# Patient Record
Sex: FEMALE | Race: WHITE | NOT HISPANIC OR LATINO | Employment: OTHER | URBAN - METROPOLITAN AREA
[De-identification: names, ages, dates, MRNs, and addresses within clinical notes are randomized per-mention and may not be internally consistent; named-entity substitution may affect disease eponyms.]

---

## 2023-03-07 LAB
APPEARANCE, URINE: CLEAR
BILIRUBIN, URINE: NEGATIVE
BLOOD, URINE: NEGATIVE
COLOR, URINE: ABNORMAL
CREATININE (MG/DL) IN URINE: 18.4 MG/DL (ref 20–320)
GLUCOSE, URINE: NEGATIVE MG/DL
KETONES, URINE: NEGATIVE MG/DL
LEUKOCYTE ESTERASE, URINE: NEGATIVE
NITRITE, URINE: NEGATIVE
PH, URINE: 5 (ref 5–8)
PROTEIN (MG/DL) IN URINE: <4 MG/DL (ref 5–24)
PROTEIN, URINE: NEGATIVE MG/DL
PROTEIN/CREATININE (MG/MG) IN URINE: ABNORMAL MG/MG CREAT (ref 0–0.17)
SPECIFIC GRAVITY, URINE: 1 (ref 1–1.03)
UROBILINOGEN, URINE: <2 MG/DL (ref 0–1.9)

## 2023-10-02 ENCOUNTER — ANCILLARY PROCEDURE (OUTPATIENT)
Dept: RADIOLOGY | Facility: CLINIC | Age: 78
End: 2023-10-02
Payer: MEDICARE

## 2023-10-02 DIAGNOSIS — M81.0 AGE-RELATED OSTEOPOROSIS WITHOUT CURRENT PATHOLOGICAL FRACTURE: ICD-10-CM

## 2023-10-02 PROCEDURE — 77080 DXA BONE DENSITY AXIAL: CPT | Performed by: RADIOLOGY

## 2023-10-02 PROCEDURE — 77080 DXA BONE DENSITY AXIAL: CPT

## 2023-10-03 DIAGNOSIS — J31.0 CHRONIC RHINITIS: ICD-10-CM

## 2023-10-03 RX ORDER — MONTELUKAST SODIUM 10 MG/1
10 TABLET ORAL EVERY EVENING
COMMUNITY
Start: 2023-07-10 | End: 2023-10-03 | Stop reason: SDUPTHER

## 2023-10-03 RX ORDER — MONTELUKAST SODIUM 10 MG/1
10 TABLET ORAL EVERY EVENING
Qty: 90 TABLET | Refills: 1 | Status: SHIPPED | OUTPATIENT
Start: 2023-10-03 | End: 2024-03-28

## 2023-10-23 ENCOUNTER — TELEPHONE (OUTPATIENT)
Dept: HEMATOLOGY/ONCOLOGY | Facility: HOSPITAL | Age: 78
End: 2023-10-23

## 2023-10-24 ENCOUNTER — LAB (OUTPATIENT)
Dept: LAB | Facility: CLINIC | Age: 78
End: 2023-10-24
Payer: MEDICARE

## 2023-10-24 DIAGNOSIS — C34.90 MALIGNANT NEOPLASM OF UNSPECIFIED PART OF UNSPECIFIED BRONCHUS OR LUNG (MULTI): ICD-10-CM

## 2023-10-24 PROBLEM — L57.9 SKIN CHANGES DUE TO CHRONIC EXPOSURE TO NONIONIZING RADIATION, UNSPECIFIED: Status: ACTIVE | Noted: 2022-10-31

## 2023-10-24 PROBLEM — L27.0 RASH, DRUG: Status: ACTIVE | Noted: 2023-10-24

## 2023-10-24 PROBLEM — L85.3 XEROSIS CUTIS: Status: ACTIVE | Noted: 2022-10-31

## 2023-10-24 PROBLEM — H70.90 MASTOIDITIS: Status: ACTIVE | Noted: 2023-10-24

## 2023-10-24 PROBLEM — T78.40XA ALLERGIC REACTION: Status: ACTIVE | Noted: 2023-10-24

## 2023-10-24 PROBLEM — J31.0 CHRONIC RHINITIS: Status: ACTIVE | Noted: 2023-10-24

## 2023-10-24 PROBLEM — J45.20 INTERMITTENT ASTHMA (HHS-HCC): Status: ACTIVE | Noted: 2023-10-24

## 2023-10-24 PROBLEM — E03.9 HYPOTHYROIDISM: Status: ACTIVE | Noted: 2023-10-24

## 2023-10-24 PROBLEM — M25.473 ANKLE EDEMA: Status: ACTIVE | Noted: 2023-10-24

## 2023-10-24 PROBLEM — R42 DIZZINESS: Status: ACTIVE | Noted: 2023-10-24

## 2023-10-24 PROBLEM — R59.0 MEDIASTINAL ADENOPATHY: Status: ACTIVE | Noted: 2023-10-24

## 2023-10-24 PROBLEM — G43.909 HEMICRANIA MIGRAINE: Status: ACTIVE | Noted: 2023-10-24

## 2023-10-24 PROBLEM — D22.5 MELANOCYTIC NEVI OF TRUNK: Status: ACTIVE | Noted: 2022-10-31

## 2023-10-24 PROBLEM — H52.203 ASTIGMATISM OF BOTH EYES: Status: ACTIVE | Noted: 2023-10-24

## 2023-10-24 PROBLEM — H52.03 HYPEROPIA WITH PRESBYOPIA OF BOTH EYES: Status: ACTIVE | Noted: 2023-10-24

## 2023-10-24 PROBLEM — L91.8 CUTANEOUS SKIN TAGS: Status: ACTIVE | Noted: 2023-10-24

## 2023-10-24 PROBLEM — G43.109 CLASSIC MIGRAINE WITH AURA: Status: ACTIVE | Noted: 2023-10-24

## 2023-10-24 PROBLEM — H40.1122 PRIMARY OPEN ANGLE GLAUCOMA OF LEFT EYE, MODERATE STAGE: Status: ACTIVE | Noted: 2023-10-24

## 2023-10-24 PROBLEM — C34.11 PRIMARY MALIGNANT NEOPLASM OF RIGHT UPPER LOBE OF LUNG (MULTI): Status: ACTIVE | Noted: 2023-10-24

## 2023-10-24 PROBLEM — R91.8 PULMONARY NODULES/LESIONS, MULTIPLE: Status: ACTIVE | Noted: 2023-10-24

## 2023-10-24 PROBLEM — H52.11 MYOPIA OF RIGHT EYE: Status: ACTIVE | Noted: 2023-10-24

## 2023-10-24 PROBLEM — L91.8 OTHER HYPERTROPHIC DISORDERS OF THE SKIN: Status: ACTIVE | Noted: 2022-10-31

## 2023-10-24 PROBLEM — H25.11 AGE-RELATED NUCLEAR CATARACT OF RIGHT EYE: Status: ACTIVE | Noted: 2023-10-24

## 2023-10-24 PROBLEM — I10 WHITE COAT SYNDROME WITH HYPERTENSION: Status: ACTIVE | Noted: 2023-10-24

## 2023-10-24 PROBLEM — H01.006 BLEPHARITIS, BOTH EYES: Status: ACTIVE | Noted: 2023-10-24

## 2023-10-24 PROBLEM — M47.816 OSTEOARTHRITIS OF LUMBAR SPINE: Status: ACTIVE | Noted: 2023-10-24

## 2023-10-24 PROBLEM — D22.70 MELANOCYTIC NEVI OF LOWER LIMB, INCLUDING HIP: Status: ACTIVE | Noted: 2022-10-31

## 2023-10-24 PROBLEM — H74.90 MASTOID DISORDER: Status: ACTIVE | Noted: 2023-10-24

## 2023-10-24 PROBLEM — I10 ESSENTIAL HYPERTENSION: Status: ACTIVE | Noted: 2023-10-24

## 2023-10-24 PROBLEM — G44.039 PAROXYSMAL HEMICRANIA: Status: ACTIVE | Noted: 2023-10-24

## 2023-10-24 PROBLEM — H01.003 BLEPHARITIS, BOTH EYES: Status: ACTIVE | Noted: 2023-10-24

## 2023-10-24 PROBLEM — E78.5 HYPERLIPIDEMIA: Status: ACTIVE | Noted: 2023-10-24

## 2023-10-24 PROBLEM — D18.01 HEMANGIOMA OF SKIN AND SUBCUTANEOUS TISSUE: Status: ACTIVE | Noted: 2022-10-31

## 2023-10-24 PROBLEM — H52.02 HYPERMETROPIA OF LEFT EYE: Status: ACTIVE | Noted: 2023-10-24

## 2023-10-24 PROBLEM — J45.909 MODERATE ASTHMA (HHS-HCC): Status: ACTIVE | Noted: 2023-10-24

## 2023-10-24 PROBLEM — E27.8 ADRENAL MASS (MULTI): Status: ACTIVE | Noted: 2023-10-24

## 2023-10-24 PROBLEM — H40.9 GLAUCOMA: Status: ACTIVE | Noted: 2023-10-24

## 2023-10-24 PROBLEM — D22.9 BENIGN MOLE: Status: ACTIVE | Noted: 2023-10-24

## 2023-10-24 PROBLEM — R91.1 NODULE OF UPPER LOBE OF RIGHT LUNG: Status: ACTIVE | Noted: 2023-10-24

## 2023-10-24 PROBLEM — H25.12 AGE-RELATED NUCLEAR CATARACT OF LEFT EYE: Status: ACTIVE | Noted: 2023-10-24

## 2023-10-24 PROBLEM — H26.9 CATARACT, LEFT EYE: Status: ACTIVE | Noted: 2023-10-24

## 2023-10-24 PROBLEM — G47.00 INSOMNIA: Status: ACTIVE | Noted: 2023-10-24

## 2023-10-24 PROBLEM — H52.4 HYPEROPIA WITH PRESBYOPIA OF BOTH EYES: Status: ACTIVE | Noted: 2023-10-24

## 2023-10-24 PROBLEM — D22.60 MELANOCYTIC NEVI OF UNSPECIFIED UPPER LIMB, INCLUDING SHOULDER: Status: ACTIVE | Noted: 2022-10-31

## 2023-10-24 PROBLEM — L71.9 ROSACEA, UNSPECIFIED: Status: ACTIVE | Noted: 2022-10-31

## 2023-10-24 PROBLEM — R21 FACIAL RASH: Status: ACTIVE | Noted: 2023-10-24

## 2023-10-24 PROBLEM — K59.09 CHRONIC CONSTIPATION: Status: ACTIVE | Noted: 2023-10-24

## 2023-10-24 PROBLEM — R53.83 FATIGUE DUE TO TREATMENT: Status: ACTIVE | Noted: 2023-10-24

## 2023-10-24 PROBLEM — B00.9 HERPES SIMPLEX: Status: ACTIVE | Noted: 2023-10-24

## 2023-10-24 PROBLEM — L81.4 OTHER MELANIN HYPERPIGMENTATION: Status: ACTIVE | Noted: 2022-10-31

## 2023-10-24 PROBLEM — H47.399 CUP TO DISC ASYMMETRY: Status: ACTIVE | Noted: 2023-10-24

## 2023-10-24 PROBLEM — D22.9 NUMEROUS MOLES: Status: ACTIVE | Noted: 2023-10-24

## 2023-10-24 PROBLEM — M81.0 AGE RELATED OSTEOPOROSIS: Status: ACTIVE | Noted: 2023-10-24

## 2023-10-24 PROBLEM — L82.1 OTHER SEBORRHEIC KERATOSIS: Status: ACTIVE | Noted: 2022-10-31

## 2023-10-24 PROBLEM — E55.9 VITAMIN D DEFICIENCY: Status: ACTIVE | Noted: 2023-10-24

## 2023-10-24 PROBLEM — F41.9 ANXIETY: Status: ACTIVE | Noted: 2023-10-24

## 2023-10-24 PROBLEM — R20.2 PARESTHESIA: Status: ACTIVE | Noted: 2023-10-24

## 2023-10-24 PROBLEM — C44.92 SCC (SQUAMOUS CELL CARCINOMA): Status: ACTIVE | Noted: 2023-10-24

## 2023-10-24 PROBLEM — M85.80 OSTEOPENIA: Status: ACTIVE | Noted: 2023-10-24

## 2023-10-24 LAB
ALBUMIN SERPL BCP-MCNC: 4.2 G/DL (ref 3.4–5)
ALP SERPL-CCNC: 61 U/L (ref 33–136)
ALT SERPL W P-5'-P-CCNC: 13 U/L (ref 7–45)
ANION GAP SERPL CALC-SCNC: 12 MMOL/L (ref 10–20)
AST SERPL W P-5'-P-CCNC: 16 U/L (ref 9–39)
BASOPHILS # BLD MANUAL: 0 X10*3/UL (ref 0–0.1)
BASOPHILS NFR BLD MANUAL: 0 %
BILIRUB SERPL-MCNC: 0.4 MG/DL (ref 0–1.2)
BUN SERPL-MCNC: 20 MG/DL (ref 6–23)
CALCIUM SERPL-MCNC: 9 MG/DL (ref 8.6–10.6)
CHLORIDE SERPL-SCNC: 103 MMOL/L (ref 98–107)
CO2 SERPL-SCNC: 28 MMOL/L (ref 21–32)
CORTIS SERPL-MCNC: 16.8 UG/DL (ref 2.5–20)
CREAT SERPL-MCNC: 0.88 MG/DL (ref 0.5–1.05)
DACRYOCYTES BLD QL SMEAR: ABNORMAL
EOSINOPHIL # BLD MANUAL: 0 X10*3/UL (ref 0–0.4)
EOSINOPHIL NFR BLD MANUAL: 0 %
ERYTHROCYTE [DISTWIDTH] IN BLOOD BY AUTOMATED COUNT: 12.2 % (ref 11.5–14.5)
GFR SERPL CREATININE-BSD FRML MDRD: 67 ML/MIN/1.73M*2
GLUCOSE SERPL-MCNC: 121 MG/DL (ref 74–99)
HCT VFR BLD AUTO: 38.8 % (ref 36–46)
HGB BLD-MCNC: 12.6 G/DL (ref 12–16)
IMM GRANULOCYTES # BLD AUTO: 0.03 X10*3/UL (ref 0–0.5)
IMM GRANULOCYTES NFR BLD AUTO: 0.2 % (ref 0–0.9)
LYMPHOCYTES # BLD MANUAL: 12.34 X10*3/UL (ref 0.8–3)
LYMPHOCYTES NFR BLD MANUAL: 62 %
MAGNESIUM SERPL-MCNC: 2.08 MG/DL (ref 1.6–2.4)
MCH RBC QN AUTO: 29.7 PG (ref 26–34)
MCHC RBC AUTO-ENTMCNC: 32.5 G/DL (ref 32–36)
MCV RBC AUTO: 92 FL (ref 80–100)
MONOCYTES # BLD MANUAL: 1 X10*3/UL (ref 0.05–0.8)
MONOCYTES NFR BLD MANUAL: 5 %
NEUTS SEG # BLD MANUAL: 4.98 X10*3/UL (ref 1.6–5)
NEUTS SEG NFR BLD MANUAL: 25 %
NRBC BLD-RTO: ABNORMAL /100{WBCS}
OVALOCYTES BLD QL SMEAR: ABNORMAL
PLATELET # BLD AUTO: 179 X10*3/UL (ref 150–450)
PMV BLD AUTO: 9 FL (ref 7.5–11.5)
POTASSIUM SERPL-SCNC: 4 MMOL/L (ref 3.5–5.3)
PROT SERPL-MCNC: 6.2 G/DL (ref 6.4–8.2)
RBC # BLD AUTO: 4.24 X10*6/UL (ref 4–5.2)
RBC MORPH BLD: ABNORMAL
SODIUM SERPL-SCNC: 139 MMOL/L (ref 136–145)
TOTAL CELLS COUNTED BLD: 100
VARIANT LYMPHS # BLD MANUAL: 1.59 X10*3/UL (ref 0–0.3)
VARIANT LYMPHS NFR BLD: 8 %
WBC # BLD AUTO: 19.9 X10*3/UL (ref 4.4–11.3)

## 2023-10-24 PROCEDURE — 82024 ASSAY OF ACTH: CPT

## 2023-10-24 PROCEDURE — 36415 COLL VENOUS BLD VENIPUNCTURE: CPT

## 2023-10-24 PROCEDURE — 80053 COMPREHEN METABOLIC PANEL: CPT | Performed by: NURSE PRACTITIONER

## 2023-10-24 PROCEDURE — 82533 TOTAL CORTISOL: CPT | Performed by: NURSE PRACTITIONER

## 2023-10-24 PROCEDURE — 85027 COMPLETE CBC AUTOMATED: CPT

## 2023-10-24 PROCEDURE — 83735 ASSAY OF MAGNESIUM: CPT | Performed by: NURSE PRACTITIONER

## 2023-10-24 PROCEDURE — 85007 BL SMEAR W/DIFF WBC COUNT: CPT

## 2023-10-24 RX ORDER — PEMBROLIZUMAB 25 MG/ML
INJECTION, SOLUTION INTRAVENOUS
COMMUNITY
Start: 2022-06-09 | End: 2024-02-12 | Stop reason: ALTCHOICE

## 2023-10-24 RX ORDER — TRIAMCINOLONE ACETONIDE 1 MG/ML
LOTION TOPICAL 2 TIMES DAILY
COMMUNITY
Start: 2020-08-20 | End: 2024-02-12 | Stop reason: ALTCHOICE

## 2023-10-24 RX ORDER — UBIDECARENONE 75 MG
CAPSULE ORAL
COMMUNITY

## 2023-10-24 RX ORDER — VITAMIN B COMPLEX
CAPSULE ORAL
COMMUNITY

## 2023-10-24 RX ORDER — CHOLECALCIFEROL (VITAMIN D3) 25 MCG
1000 TABLET ORAL
COMMUNITY
Start: 2018-04-12 | End: 2024-02-12 | Stop reason: ALTCHOICE

## 2023-10-24 RX ORDER — GLUCOSAMINE SULFATE 500 MG
1000 TABLET ORAL
COMMUNITY

## 2023-10-24 RX ORDER — ONDANSETRON HYDROCHLORIDE 8 MG/1
8 TABLET, FILM COATED ORAL EVERY 8 HOURS PRN
COMMUNITY
Start: 2020-01-08 | End: 2024-02-12 | Stop reason: ALTCHOICE

## 2023-10-24 RX ORDER — GABAPENTIN 100 MG/1
100 CAPSULE ORAL
COMMUNITY
Start: 2023-03-23 | End: 2024-02-12 | Stop reason: ALTCHOICE

## 2023-10-24 RX ORDER — DIAPER,BRIEF,ADULT, DISPOSABLE
1200 EACH MISCELLANEOUS
COMMUNITY

## 2023-10-24 RX ORDER — PEMETREXED 500 MG/20ML
INJECTION, POWDER, LYOPHILIZED, FOR SOLUTION INTRAVENOUS
COMMUNITY
Start: 2022-06-09 | End: 2024-02-12 | Stop reason: ALTCHOICE

## 2023-10-24 RX ORDER — ALPRAZOLAM 0.5 MG/1
0.5 TABLET ORAL 3 TIMES DAILY PRN
COMMUNITY
End: 2023-11-08 | Stop reason: SDUPTHER

## 2023-10-24 RX ORDER — ASCORBIC ACID 500 MG
1 TABLET ORAL DAILY
COMMUNITY

## 2023-10-24 RX ORDER — PSYLLIUM SEED (WITH DEXTROSE)
POWDER (GRAM) ORAL
COMMUNITY

## 2023-10-24 RX ORDER — SYRING-NEEDL,DISP,INSUL,0.3 ML 29 G X1/2"
SYRINGE, EMPTY DISPOSABLE MISCELLANEOUS
COMMUNITY
Start: 2020-07-16 | End: 2024-02-12 | Stop reason: ALTCHOICE

## 2023-10-24 RX ORDER — TAFLUPROST OPTHALMIC 0 MG/.3ML
1 SOLUTION/ DROPS OPHTHALMIC NIGHTLY
COMMUNITY
End: 2024-02-12 | Stop reason: ALTCHOICE

## 2023-10-24 RX ORDER — LISINOPRIL 2.5 MG/1
1 TABLET ORAL DAILY
COMMUNITY
Start: 2017-06-08 | End: 2024-02-12 | Stop reason: ALTCHOICE

## 2023-10-24 RX ORDER — CALCIUM CARBONATE 600 MG
1 TABLET ORAL 2 TIMES DAILY
COMMUNITY
Start: 2018-04-12

## 2023-10-24 RX ORDER — ROSUVASTATIN CALCIUM 10 MG/1
1 TABLET, COATED ORAL DAILY
COMMUNITY
Start: 2016-03-30 | End: 2023-10-31 | Stop reason: SDUPTHER

## 2023-10-24 RX ORDER — LEVOTHYROXINE SODIUM 100 UG/1
100 TABLET ORAL
COMMUNITY
End: 2024-02-12 | Stop reason: WASHOUT

## 2023-10-24 RX ORDER — FLAXSEED OIL 1000 MG
CAPSULE ORAL
COMMUNITY
End: 2024-02-12 | Stop reason: ALTCHOICE

## 2023-10-24 RX ORDER — DEXAMETHASONE 4 MG/1
4 TABLET ORAL 2 TIMES DAILY
COMMUNITY
End: 2024-02-12 | Stop reason: ALTCHOICE

## 2023-10-24 RX ORDER — DICLOFENAC SODIUM 1 MG/ML
SOLUTION/ DROPS OPHTHALMIC
COMMUNITY
Start: 2022-07-22 | End: 2024-02-12 | Stop reason: ALTCHOICE

## 2023-10-24 RX ORDER — ASPIRIN 81 MG
TABLET, DELAYED RELEASE (ENTERIC COATED) ORAL
COMMUNITY

## 2023-10-24 RX ORDER — FOLIC ACID 1 MG/1
1 TABLET ORAL DAILY
COMMUNITY

## 2023-10-24 RX ORDER — TURMERIC ROOT EXTRACT 500 MG
500 TABLET ORAL
COMMUNITY

## 2023-10-24 RX ORDER — ACETAMINOPHEN 500 MG
5 TABLET ORAL NIGHTLY
COMMUNITY
Start: 2022-06-09

## 2023-10-24 RX ORDER — METRONIDAZOLE 7.5 MG/G
1 CREAM TOPICAL
COMMUNITY
Start: 2021-12-20 | End: 2024-02-12 | Stop reason: ALTCHOICE

## 2023-10-24 RX ORDER — LEVOTHYROXINE SODIUM 75 UG/1
1 TABLET ORAL DAILY
COMMUNITY
Start: 2013-04-26 | End: 2024-03-22 | Stop reason: SDUPTHER

## 2023-10-24 RX ORDER — EPINEPHRINE 0.22MG
100 AEROSOL WITH ADAPTER (ML) INHALATION
COMMUNITY

## 2023-10-25 ENCOUNTER — OFFICE VISIT (OUTPATIENT)
Dept: HEMATOLOGY/ONCOLOGY | Facility: CLINIC | Age: 78
End: 2023-10-25
Payer: MEDICARE

## 2023-10-25 DIAGNOSIS — C34.90 MALIGNANT NEOPLASM OF UNSPECIFIED PART OF UNSPECIFIED BRONCHUS OR LUNG (MULTI): ICD-10-CM

## 2023-10-25 PROCEDURE — 1126F AMNT PAIN NOTED NONE PRSNT: CPT | Performed by: NURSE PRACTITIONER

## 2023-10-25 PROCEDURE — 99441 PR PHYS/QHP TELEPHONE EVALUATION 5-10 MIN: CPT | Performed by: NURSE PRACTITIONER

## 2023-10-25 NOTE — PROGRESS NOTES
[facility] - Medical Oncology Follow-Up Visit    Patient ID: Herlinda Springer is a 77 y.o. female      Current therapy: Surveillance    Oncologic History:          Cancer History:    Lung   AJCC Edition: 8th (AJCC), Diagnosis Date: 17-Dec-2019, CLIFTON, cT2a cN3 cM1b     Treatment Synopsis:    Stage CLIFTON ([id number]) NSCLC (adenocarcinoma, TTF-1+, Napsin A+) of the RUL   Dx through left adrenal gland bx on 12/17/19  MRI brain 12/22/19 - 2 mm enhancing lesion left parietal lobe, unclear if metastasis - repeat MRI 06/26/20 showed resolution of that finding   CT 06/26/20 shows oligo-progression of RUL - left adrenal metastasis stable - s/p RT to the RUL - completed 08/19/20    Molecular testing:  TP53 mutant   EGFR negative  ALK-1 negative   ROS1 negative  BRAF V600E negative  NTRK negative     PD-L1 TPS < 1%    Current therapy:   Cycle # 1 carboplatin/pemetrexed/pembrolizumab - 01/15/20, cycle # 4 on 03/18/20  Starts maintenance pemetrexed/pembrolizumab on 04/08/20  Pemetrexed discontinued on 03/01/2023 due to decline GFR'   Last pembrolizumab dose on 08/02/2023    Co-morbidities:  Asthma  HTN  Hypothyroidism   HLD  Glaucoma               Treatment History:    Mrs. Springer is a 76yo white female, former smoker (quit 25 years prior to dx)who presented with new onset respiratory sx (cough), decreased appetite and weight loss (lost 13 lbs in 2 months).   Chest CT 11/07/19 showed a RUL nodule measuring 4 cm, mediastinal adenopathy (level 2L measuring 1.8 cm, .   A PET scan obtained on 12/03/19 showed FDG uptake in the RUL nodule as well as 1R/1L nodes, bilateral mediastinal and right hilar node. Left adrenal gland was hypermetabolic, c/w metastasis.  On 12/17/19 CT-guided biopsy of the left adrenal gland confirmed the presence of a mucinous adenocarcinoma, TTF-1 positive, Napsin A and CK7 also positive. PD-L1 TPS < 1%, with no actionable mutations, TP53 mutant.   MRI brain 12/22/19 showed a 2 mm  enhancement in left temporal lobe that is nonspecific but could be an additional metastatic site.   01/15/20: cycle # 1 carbo/pemetrexed/pembrolizumab   03/18/20: Cycle # 4 carbo/pemetrexed/pembrolizumab   04/08/20: starts maintenance pemetrexed/pembrolizumab   05/26/20: MRI brain shows no intracranial disease  05/26/20: CT C/A/P: increase in size of RUL mass - now measuring ~ 3.2 cm and stability of left adrenal gland - referred to Rad Onc for consideration of SBRT - continues pemetrexed/pembrolizumab maintenance   08/19/20:  completes RT to the RUL  08/20/20: grade 1 rash b/l UEs - treated with topical triamcinolone - continue pemetrexed + pembrolizumab  10/16/20: CT C/A/P: Increased consolidative changes  in the RUL c/w RT-induced fibrosis - no LAD. The adrenal glands look stable - the multiple lung GG opacifications are stable   11/27/20: CT C/A/P - improvement on RUL interstitial changes - there is no sign of PD. The RUL mass has decreased in size from 7 to 5 cm in greatest diameter - left adrenal gland is stable in size.   02/19/21: CT C/A/P personally reviewed by me: the RUL apical lesion that has been irradiated is further  decreased in size, now measuring 3.9 cm x 2 cm and previously on 11/24/21 measuring 4.8 cm x 2.4 cm (solid component). The left adrenal mass measures 3.4 cm and is stable in size. There are no new concerning metastatic lesions,. Multiple areas of GGO remain and are of no clinical significance in this patients with metastatic disease.   05/14/21: CT Chest/abdomen/pelvis personally reviewed by me. The RUL mass remains stable and so do the nodes. However, the GGO opacifications persist - the left adrenal gland is slightly smaller (3.6 cm << 3.8 cm). The right adrenal gland remains stable.     6/30/21: Will continue maintenance pembrolizumab and pemetrexed. Given CrCl, will dose Pemetrexed at 375 mg/m2 today.     08/06/21: CT C/A/P shows further development of interstitial changes in the RUL  that look purely inflammatory in nature - there are no concerns for PD - no worsening LAD, the left adrenal mass remains stable at 3.6 cm. This adrenal gland has been biopsied at her dx     12/09/21: CT C/A/P shows SD - the RUL changes from prior RT remain stable - no new emerging LAD. Adrenal glands stable     12/15/21: dopplers LE negative for DVT     03/04/2022: CT Chest/Abdomen/Pelvis shows no signs of PD with the exception of left adrenal gland that continues to measure 3.4 cm however has developed an area of necrosis that is concerning. Both RUL and LLL GGO remain stable measuring 2.7 cm and 2.8 cm respectively     03/25/2022: PET scan demonstrates close to metabolic complete remission, left adrenal gland SUV has decreased from 6.4 to 2.2    4/4/22-4/13/22: Completed SBRT to left adrenal gland.    06/17/2022: CT Chest/Abdomen/Pelvis demonstrates stable findings, stable size of left adrenal gland, stable GGO throughout bilateral lung fields     09/09/2022: CT CAP shows stable disease including stability of L adrenal gland; concern in liver noted on read reviewed and more consistent with fatty infiltration also seen on previous scans    10/07/2022: MRI brain demonstrates no intracranial metastasis.  Findings compatible with mastoiditis but subsequent CT scan ruled out mastoiditis, with a finding of osteopenia.    12/02/2022: CT CAP shows stable disease within the lung, L adrenal gland. Also noted stable pancreatic head & uncinate process lesions thought to represent IPMNs    02/20/2023: CT chest/abdomen/pelvis personally reviewed by me, demonstrating stable changes in the lungs as well as adrenal glands.  No new metastatic sites demonstrated.    03/01/2023: Discussion with patient regards to risks and benefits of continued single agent pembrolizumab in view of her issues with declining GFR associated with pemetrexed infusions.  Decision made to continue on single agent pembrolizumab every 3 weeks for  now    05/18/2023: CT CAP show stable disease without new metastatic sites    08/02/2023: Last pembrolizumab dose    08/25/2023: CT chest/abdomen/pelvis demonstrating stability of findings with no new metastatic sites  Left adrenal measuring 3 cm in greatest diameter                    Chief Concern: Phone visit for follow-up and review of labs    HPI Phone visit follow-up today. Labs reviewed. Leukocytosis continues without clear cause. Patient states her abdominal pain has completely resolved. Breathing is stable. No GI/ symptoms. No new aches/pains. No fevers, chills, or cold symptoms No neurological symptoms. Referral placed for hematology for unclear leukocytosis, was able to schedule 11/9/23 appointment with Rose Casal.       Meds (Current):    Current Outpatient Medications:     ALPRAZolam (Xanax) 0.5 mg tablet, Take 1 tablet (0.5 mg) by mouth 3 times a day as needed for anxiety., Disp: , Rfl:     ascorbic acid (Vitamin C) 500 mg tablet, Take 1 tablet (500 mg) by mouth once daily., Disp: , Rfl:     b complex vitamins capsule, Take by mouth., Disp: , Rfl:     calcium carbonate 600 mg calcium (1,500 mg) tablet, Take 1 tablet (600 mg) by mouth 2 times a day., Disp: , Rfl:     calcium carbonate/vitamin D3 (CALCIUM 600 + D,3, ORAL), Take by mouth., Disp: , Rfl:     cholecalciferol (Vitamin D-3) 25 MCG (1000 UT) tablet, Take 1 tablet (1,000 Units) by mouth 1 (one) time per week., Disp: , Rfl:     coenzyme Q-10 100 mg capsule, Take 1 capsule (100 mg) by mouth., Disp: , Rfl:     cyanocobalamin (Vitamin B-12) 500 mcg tablet, Take by mouth., Disp: , Rfl:     dexAMETHasone (Decadron) 4 mg tablet, Take 1 tablet (4 mg) by mouth 2 times a day., Disp: , Rfl:     diclofenac (Voltaren) 0.1 % ophthalmic solution, Administer into affected eye(s)., Disp: , Rfl:     docusate sodium (Stool Softener) 100 mg tablet, Take by mouth., Disp: , Rfl:     flaxseed oiL 1,000 mg capsule, Take by mouth., Disp: , Rfl:     folic acid  (Folvite) 1 mg tablet, Take 1 tablet (1 mg) by mouth once daily., Disp: , Rfl:     gabapentin (Neurontin) 100 mg capsule, Take 1 capsule (100 mg) by mouth., Disp: , Rfl:     lecithin, soy 1,200 mg capsule, Take 1,200 mg by mouth., Disp: , Rfl:     levothyroxine (Synthroid) 100 mcg tablet, Take 1 tablet (100 mcg) by mouth., Disp: , Rfl:     lisinopril 2.5 mg tablet, Take 1 tablet (2.5 mg) by mouth once daily., Disp: , Rfl:     lysine 1,000 mg tablet, Take 1 tablet (1,000 mg) by mouth., Disp: , Rfl:     magnesium citrate solution, Take by mouth., Disp: , Rfl:     melatonin 5 mg tablet, Take 1 tablet (5 mg) by mouth once daily at bedtime., Disp: , Rfl:     metroNIDAZOLE (MetroCream) 0.75 % cream, Apply 1 Application topically., Disp: , Rfl:     MILK THISTLE ORAL, Take 250 mg by mouth., Disp: , Rfl:     montelukast (Singulair) 10 mg tablet, Take 1 tablet (10 mg) by mouth once daily in the evening., Disp: 90 tablet, Rfl: 1    ondansetron (Zofran) 8 mg tablet, Take 1 tablet (8 mg) by mouth every 8 hours if needed., Disp: , Rfl:     pembrolizumab (Keytruda) 25 mg/mL chemo injection, Infuse into a venous catheter., Disp: , Rfl:     PEMBROLIZUMAB IV, Infuse 200 mg into a venous catheter., Disp: , Rfl:     PEMEtrexed disodium (Alimta) 500 mg chemo injection, Infuse into a venous catheter., Disp: , Rfl:     psyllium husk (METAMUCIL ORAL), Take by mouth., Disp: , Rfl:     psyllium husk, with sugar, (Metamucil, with sugar,) 3.4 gram/12 gram powder, Take by mouth., Disp: , Rfl:     rosuvastatin (Crestor) 10 mg tablet, Take 1 tablet (10 mg) by mouth once daily., Disp: , Rfl:     selenium (SELENIMIN ORAL), Take by mouth., Disp: , Rfl:     Study [id number] pembrolizumab 25 mg/mL, Infuse into a venous catheter., Disp: , Rfl:     Synthroid 75 mcg tablet, Take 1 tablet (75 mcg) by mouth once daily., Disp: , Rfl:     tafluprost, PF, (Zioptan, PF,) 0.0015 % dropperette, Administer 1 drop into affected eye(s) once daily at bedtime., Disp: ,  Rfl:     triamcinolone (Kenalog) 0.1 % lotion, Apply topically 2 times a day., Disp: , Rfl:     TUMERIC-GING-OLIVE-OREG-CAPRYL ORAL, Take by mouth., Disp: , Rfl:     turmeric root extract 500 mg tablet, Take 500 mg by mouth., Disp: , Rfl:     Review of Systems - Oncology 12 point ROS was obtained and negative unless otherwise mentioned in the above HPI.      Objective   BSA: There is no height or weight on file to calculate BSA.  Wt Readings from Last 5 Encounters:   06/09/23 55.8 kg (123 lb)   03/23/23 56.7 kg (125 lb)   03/22/23 56.8 kg (125 lb 3.5 oz)   03/03/23 56.7 kg (125 lb)   03/01/23 57.8 kg (127 lb 6.8 oz)     There were no vitals taken for this visit.         Physical Exam Not performed due to visit type.      Results:  Labs:  Lab Results   Component Value Date    WBC 19.9 (H) 10/24/2023    HGB 12.6 10/24/2023    HCT 38.8 10/24/2023    MCV 92 10/24/2023     10/24/2023      Lab Results   Component Value Date    NEUTROABS 4.19 06/12/2023      Lab Results   Component Value Date    GLUCOSE 121 (H) 10/24/2023    CALCIUM 9.0 10/24/2023     10/24/2023    K 4.0 10/24/2023    CO2 28 10/24/2023     10/24/2023    BUN 20 10/24/2023    CREATININE 0.88 10/24/2023    MG 2.08 10/24/2023     Lab Results   Component Value Date    ALT 13 10/24/2023    AST 16 10/24/2023    ALKPHOS 61 10/24/2023    BILITOT 0.4 10/24/2023      Lab Results   Component Value Date    ACTH 63.3 09/18/2023    CORTISOL 16.8 10/24/2023    TSH 0.83 09/18/2023    FREET4 1.26 09/18/2023       Imaging:         === Results for orders placed in visit on 10/07/22 ===    MR brain w and wo IV contrast [[id number]] 10/07/2022    Status: Normal  1. No evidence of intracranial metastases. Stable appearance of the  intracranial compartment.    2. Chronic appearing mucosal disease has mildly increased since the  prior MRI and there is increased opacification of bilateral mastoid  air cells with nonspecific fluid. Correlate clinically with  symptoms  of progressive mucosal disease and mastoiditis in this patient with  dizziness. No air-fluid levels are seen within the paranasal sinuses.    This study was interpreted at [facility].    Assessment/Plan      Herlinda Springer is a 77 y.o. female here for follow up   Assessment and Plan:   Assessment:    Stage CLIFTON NSCLC (adenocarcinoma) with biopsy proven metastasis to the left adrenal gland - dx on 12/17/19  Tumor had no actionable mutations (TP53 mutant) - and PD-L1 TPS < 1%.  s/p 4 cycles of chemoimmunotherapy utilizing carboplatin/ pemetrexed/ pembrolizumab from 1/15/20-3/18/2020  Most recent CT scans continue to show disease control on maintenance pembrolizumab; pemetrexed discontinued due to drop in GFR.      SBRT to left adrenal gland 04/04/2022-4/13/22.  Last pembrolizumab on 08/02/2023  Currently on observation    Today, she is feeling well. Labs were reviewed. No other concerns or complaints.     Plan:    Plan:  #Stage CLIFTON NSCLC -   Scans from 08/25/2023 showing no new concerns  She has demonstrated a persistent leukocytosis of unclear etiology, referral placed to hematology, scheduled 11/9/23 with Rose Casal.  New symptoms as above, unclear if she will be developing immune related adverse event soon  We will continue to follow closely, Repeating scans in 3 months from now, November 2023 (ordered)   RTC 11/29/23 with Dr. Alva, with repeat scan/labs prior to visit. She will call with any new/worsening symptoms in the interim.       #Leukocytosis, abdominal pain (resolved)  Her albumin levels are in trending up, so no concerns for systemic progression, especially review of scans as above  She has no signs/symptoms of infection with the exception of elevated white blood cell count with a left shift  Amylase and lipase remain WNL  She will call with any new/worsening symptoms in the interim.     #Dizziness  -resolved  MRI brain done recently demonstrated chronic  mastoiditis, slightly worse - seen by ENT with subsequent CT r/o mastoiditis, with finding of osteopenia, was started on supplemental Calcium with vit. D    #Left adrenal gland showing change in density concerning for necrosis/progression  -3/25/2022- PET scan demonstrates near complete metabolic remission, left adrenal gland SUV decreased from 6.4 to 2.2  Status post SBRT  04/04/2022-4/13/22  -Review of most recent scans demonstrates stability of appearance of left adrenal gland     #Insomnia   -ambien as needed     #History of recurrent migraines   - Avoid NSAIDs for her migraines - explained need for adequate kidney function for pemetrexed infusion.     #Anxiety   - meditating, doing Yoga, Des and [employer] at [facility]  - uses Xanax as needed.

## 2023-10-26 LAB — ACTH PLAS-MCNC: 65.3 PG/ML (ref 7.2–63.3)

## 2023-10-30 ENCOUNTER — OFFICE VISIT (OUTPATIENT)
Dept: DERMATOLOGY | Facility: HOSPITAL | Age: 78
End: 2023-10-30
Payer: MEDICARE

## 2023-10-30 DIAGNOSIS — D22.9 MULTIPLE BENIGN NEVI: ICD-10-CM

## 2023-10-30 DIAGNOSIS — Z12.83 SCREENING EXAM FOR SKIN CANCER: ICD-10-CM

## 2023-10-30 DIAGNOSIS — D48.5 NEOPLASM OF UNCERTAIN BEHAVIOR OF SKIN: Primary | ICD-10-CM

## 2023-10-30 DIAGNOSIS — L81.4 LENTIGO: ICD-10-CM

## 2023-10-30 DIAGNOSIS — L82.1 SEBORRHEIC KERATOSIS: ICD-10-CM

## 2023-10-30 PROBLEM — H74.90 MASTOID DISORDER: Status: RESOLVED | Noted: 2023-10-24 | Resolved: 2023-10-30

## 2023-10-30 PROBLEM — J31.0 CHRONIC RHINITIS: Status: RESOLVED | Noted: 2023-10-24 | Resolved: 2023-10-30

## 2023-10-30 PROBLEM — H70.90 MASTOIDITIS: Status: RESOLVED | Noted: 2023-10-24 | Resolved: 2023-10-30

## 2023-10-30 PROBLEM — D18.01 HEMANGIOMA OF SKIN AND SUBCUTANEOUS TISSUE: Status: RESOLVED | Noted: 2022-10-31 | Resolved: 2023-10-30

## 2023-10-30 PROBLEM — L57.9 SKIN CHANGES DUE TO CHRONIC EXPOSURE TO NONIONIZING RADIATION, UNSPECIFIED: Status: RESOLVED | Noted: 2022-10-31 | Resolved: 2023-10-30

## 2023-10-30 PROBLEM — R21 FACIAL RASH: Status: RESOLVED | Noted: 2023-10-24 | Resolved: 2023-10-30

## 2023-10-30 PROBLEM — D22.70 MELANOCYTIC NEVI OF LOWER LIMB, INCLUDING HIP: Status: RESOLVED | Noted: 2022-10-31 | Resolved: 2023-10-30

## 2023-10-30 PROBLEM — L91.8 CUTANEOUS SKIN TAGS: Status: RESOLVED | Noted: 2023-10-24 | Resolved: 2023-10-30

## 2023-10-30 PROBLEM — D22.5 MELANOCYTIC NEVI OF TRUNK: Status: RESOLVED | Noted: 2022-10-31 | Resolved: 2023-10-30

## 2023-10-30 PROBLEM — C44.92 SCC (SQUAMOUS CELL CARCINOMA): Status: RESOLVED | Noted: 2023-10-24 | Resolved: 2023-10-30

## 2023-10-30 PROBLEM — D22.60 MELANOCYTIC NEVI OF UNSPECIFIED UPPER LIMB, INCLUDING SHOULDER: Status: RESOLVED | Noted: 2022-10-31 | Resolved: 2023-10-30

## 2023-10-30 PROBLEM — L91.8 OTHER HYPERTROPHIC DISORDERS OF THE SKIN: Status: RESOLVED | Noted: 2022-10-31 | Resolved: 2023-10-30

## 2023-10-30 PROCEDURE — 99213 OFFICE O/P EST LOW 20 MIN: CPT | Performed by: DERMATOLOGY

## 2023-10-30 PROCEDURE — 1126F AMNT PAIN NOTED NONE PRSNT: CPT | Performed by: DERMATOLOGY

## 2023-10-30 PROCEDURE — 1160F RVW MEDS BY RX/DR IN RCRD: CPT | Performed by: DERMATOLOGY

## 2023-10-30 PROCEDURE — 1159F MED LIST DOCD IN RCRD: CPT | Performed by: DERMATOLOGY

## 2023-10-30 ASSESSMENT — DERMATOLOGY PATIENT ASSESSMENT
DO YOU USE SUNSCREEN: DAILY
DO YOU HAVE ANY NEW OR CHANGING LESIONS: NO
ARE YOU ON BIRTH CONTROL: NO
ARE YOU TRYING TO GET PREGNANT: NO
HAVE YOU HAD OR DO YOU HAVE VASCULAR DISEASE: NO
HAVE YOU HAD OR DO YOU HAVE A STAPH INFECTION: NO
DO YOU USE A TANNING BED: NO
ARE YOU AN ORGAN TRANSPLANT RECIPIENT: NO
DO YOU HAVE IRREGULAR MENSTRUAL CYCLES: NO

## 2023-10-30 ASSESSMENT — DERMATOLOGY QUALITY OF LIFE (QOL) ASSESSMENT
ARE THERE EXCLUSIONS OR EXCEPTIONS FOR THE QUALITY OF LIFE ASSESSMENT: NO
DATE THE QUALITY-OF-LIFE ASSESSMENT WAS COMPLETED: 66777
RATE HOW EMOTIONALLY BOTHERED YOU ARE BY YOUR SKIN PROBLEM (FOR EXAMPLE, WORRY, EMBARRASSMENT, FRUSTRATION): 0 - NEVER BOTHERED
RATE HOW BOTHERED YOU ARE BY EFFECTS OF YOUR SKIN PROBLEMS ON YOUR ACTIVITIES (EG, GOING OUT, ACCOMPLISHING WHAT YOU WANT, WORK ACTIVITIES OR YOUR RELATIONSHIPS WITH OTHERS): 0 - NEVER BOTHERED
RATE HOW BOTHERED YOU ARE BY SYMPTOMS OF YOUR SKIN PROBLEM (EG, ITCHING, STINGING BURNING, HURTING OR SKIN IRRITATION): 0 - NEVER BOTHERED

## 2023-10-30 ASSESSMENT — PATIENT GLOBAL ASSESSMENT (PGA): PATIENT GLOBAL ASSESSMENT: PATIENT GLOBAL ASSESSMENT:  1 - CLEAR

## 2023-10-30 ASSESSMENT — ITCH NUMERIC RATING SCALE: HOW SEVERE IS YOUR ITCHING?: 0

## 2023-10-30 NOTE — PROGRESS NOTES
Subjective     Herlinda Springer is a 77 y.o. female who presents for the following: Skin Check.     History of lung cancer treated with immunotherapy with Keytruda, last treatment 8/2/23, stopped due to elevated white count. Plan for monitoring    Review of Systems:  No other skin or systemic complaints other than what is documented elsewhere in the note.    The following portions of the chart were reviewed this encounter and updated as appropriate:  Tobacco  Allergies  Meds  Problems  Med Hx  Surg Hx         Skin Cancer History  No skin cancer on file.      Specialty Problems          Dermatology Problems    Rosacea, unspecified    Xerosis cutis    Numerous moles    Rash, drug        Objective   Well appearing patient in no apparent distress; mood and affect are within normal limits.    A full examination was performed including scalp, head, eyes, ears, nose, lips, neck, chest, axillae, abdomen, back, buttocks, bilateral upper extremities, bilateral lower extremities, hands, feet, fingers, toes, fingernails, and toenails. All findings within normal limits unless otherwise noted below.    Assessment/Plan   1. Neoplasm of uncertain behavior of skin  Right Lower Leg - Anterior  Medial calf - 2x4 mm dark brown macule with irregular darker pigment within; stable in size x1 year              Continue to monitor clinically, photos taken today    2. Multiple benign nevi  Brown and tan macules and papules with reassuring findings on dermoscopy    -These lesions have benign, reassuring patterns on dermoscopy  -Recommend continued self observation, and to contact the office if any changes in nevi are noticed    3. Lentigo  Tan macules    -Benign appearing on exam  -Reassurance, recommend observation    4. Seborrheic keratosis  Stuck on, waxy macule(s)/papule(s)/plaque(s) with comedo-like openings and milia like cysts    -Discussed the nature of the diagnosis  -Reassurance, recommend continued observation    5. Screening  exam for skin cancer    Full body skin exam  -No lesions concerning for malignancy on the remainder the skin exam today   - The ugly duckling sign was discussed. Monitor for any skin lesions that are different in color, shape, or size than others on body  -Sun protection was discussed. Recommend SPF 30+, hats with brims, sun protective clothing, and avoiding sun exposure between 10 AM and 2 PM whenever possible  -Recommend regular skin exams or sooner if new or changing lesions         1 year Full Skin Exam

## 2023-10-31 DIAGNOSIS — E78.5 HYPERLIPIDEMIA, UNSPECIFIED HYPERLIPIDEMIA TYPE: ICD-10-CM

## 2023-10-31 RX ORDER — ROSUVASTATIN CALCIUM 10 MG/1
10 TABLET, COATED ORAL DAILY
Qty: 90 TABLET | Refills: 1 | Status: SHIPPED | OUTPATIENT
Start: 2023-10-31 | End: 2024-04-24

## 2023-11-07 ENCOUNTER — TELEPHONE (OUTPATIENT)
Dept: HEMATOLOGY/ONCOLOGY | Facility: HOSPITAL | Age: 78
End: 2023-11-07
Payer: MEDICARE

## 2023-11-08 DIAGNOSIS — F41.9 ANXIETY: Primary | ICD-10-CM

## 2023-11-08 RX ORDER — ALPRAZOLAM 0.5 MG/1
0.5 TABLET ORAL 3 TIMES DAILY PRN
Qty: 90 TABLET | Refills: 0 | Status: SHIPPED | OUTPATIENT
Start: 2023-11-08 | End: 2023-11-10 | Stop reason: SDUPTHER

## 2023-11-10 ENCOUNTER — TELEPHONE (OUTPATIENT)
Dept: ADMISSION | Facility: HOSPITAL | Age: 78
End: 2023-11-10
Payer: MEDICARE

## 2023-11-10 DIAGNOSIS — F41.9 ANXIETY: ICD-10-CM

## 2023-11-10 RX ORDER — ALPRAZOLAM 0.5 MG/1
0.5 TABLET ORAL 3 TIMES DAILY PRN
Qty: 90 TABLET | Refills: 0 | Status: SHIPPED | OUTPATIENT
Start: 2023-11-10 | End: 2023-11-29 | Stop reason: SDUPTHER

## 2023-11-10 NOTE — TELEPHONE ENCOUNTER
Pt requesting that script for alprazolam 0.5 mg TID prn.be sent to  [facility] pharmacy. Script was sent to [facility] and pt cannot take the alprazolam from the  that [facility] uses.   Please forward script to [facility] on file.   Pt has approx 5 day supply left.

## 2023-11-21 ENCOUNTER — LAB (OUTPATIENT)
Dept: LAB | Facility: CLINIC | Age: 78
End: 2023-11-21
Payer: MEDICARE

## 2023-11-21 DIAGNOSIS — R53.82 CHRONIC FATIGUE: ICD-10-CM

## 2023-11-21 DIAGNOSIS — C34.92 PRIMARY MALIGNANT NEOPLASM OF LEFT LUNG METASTATIC TO OTHER SITE (MULTI): Primary | ICD-10-CM

## 2023-11-21 DIAGNOSIS — C34.92 PRIMARY MALIGNANT NEOPLASM OF LEFT LUNG METASTATIC TO OTHER SITE (MULTI): ICD-10-CM

## 2023-11-21 LAB
ALBUMIN SERPL BCP-MCNC: 4.3 G/DL (ref 3.4–5)
ALP SERPL-CCNC: 60 U/L (ref 33–136)
ALT SERPL W P-5'-P-CCNC: 14 U/L (ref 7–45)
ANION GAP SERPL CALC-SCNC: 13 MMOL/L (ref 10–20)
AST SERPL W P-5'-P-CCNC: 17 U/L (ref 9–39)
BASOPHILS # BLD MANUAL: 0.67 X10*3/UL (ref 0–0.1)
BASOPHILS NFR BLD MANUAL: 3 %
BILIRUB SERPL-MCNC: 0.3 MG/DL (ref 0–1.2)
BUN SERPL-MCNC: 18 MG/DL (ref 6–23)
CALCIUM SERPL-MCNC: 8.8 MG/DL (ref 8.6–10.6)
CHLORIDE SERPL-SCNC: 103 MMOL/L (ref 98–107)
CO2 SERPL-SCNC: 28 MMOL/L (ref 21–32)
CORTIS SERPL-MCNC: 15.3 UG/DL (ref 2.5–20)
CREAT SERPL-MCNC: 0.84 MG/DL (ref 0.5–1.05)
EOSINOPHIL # BLD MANUAL: 0.22 X10*3/UL (ref 0–0.4)
EOSINOPHIL NFR BLD MANUAL: 1 %
ERYTHROCYTE [DISTWIDTH] IN BLOOD BY AUTOMATED COUNT: 12.6 % (ref 11.5–14.5)
GFR SERPL CREATININE-BSD FRML MDRD: 71 ML/MIN/1.73M*2
GLUCOSE SERPL-MCNC: 89 MG/DL (ref 74–99)
HCT VFR BLD AUTO: 37.6 % (ref 36–46)
HGB BLD-MCNC: 12.2 G/DL (ref 12–16)
IMM GRANULOCYTES # BLD AUTO: 0.04 X10*3/UL (ref 0–0.5)
IMM GRANULOCYTES NFR BLD AUTO: 0.2 % (ref 0–0.9)
LYMPHOCYTES # BLD MANUAL: 16.5 X10*3/UL (ref 0.8–3)
LYMPHOCYTES NFR BLD MANUAL: 74 %
MAGNESIUM SERPL-MCNC: 2.2 MG/DL (ref 1.6–2.4)
MCH RBC QN AUTO: 29.4 PG (ref 26–34)
MCHC RBC AUTO-ENTMCNC: 32.4 G/DL (ref 32–36)
MCV RBC AUTO: 91 FL (ref 80–100)
MONOCYTES # BLD MANUAL: 1.34 X10*3/UL (ref 0.05–0.8)
MONOCYTES NFR BLD MANUAL: 6 %
NEUTS SEG # BLD MANUAL: 2.45 X10*3/UL (ref 1.6–5)
NEUTS SEG NFR BLD MANUAL: 11 %
NRBC BLD-RTO: ABNORMAL /100{WBCS}
OVALOCYTES BLD QL SMEAR: ABNORMAL
PLATELET # BLD AUTO: 175 X10*3/UL (ref 150–450)
POTASSIUM SERPL-SCNC: 4 MMOL/L (ref 3.5–5.3)
PROT SERPL-MCNC: 6.3 G/DL (ref 6.4–8.2)
RBC # BLD AUTO: 4.15 X10*6/UL (ref 4–5.2)
RBC MORPH BLD: ABNORMAL
SCHISTOCYTES BLD QL SMEAR: ABNORMAL
SODIUM SERPL-SCNC: 140 MMOL/L (ref 136–145)
TOTAL CELLS COUNTED BLD: 100
TSH SERPL-ACNC: 1 MIU/L (ref 0.44–3.98)
VARIANT LYMPHS # BLD MANUAL: 1.12 X10*3/UL (ref 0–0.3)
VARIANT LYMPHS NFR BLD: 5 %
WBC # BLD AUTO: 22.3 X10*3/UL (ref 4.4–11.3)

## 2023-11-21 PROCEDURE — 83735 ASSAY OF MAGNESIUM: CPT | Performed by: INTERNAL MEDICINE

## 2023-11-21 PROCEDURE — 84443 ASSAY THYROID STIM HORMONE: CPT | Performed by: INTERNAL MEDICINE

## 2023-11-21 PROCEDURE — 85027 COMPLETE CBC AUTOMATED: CPT

## 2023-11-21 PROCEDURE — 82533 TOTAL CORTISOL: CPT | Performed by: INTERNAL MEDICINE

## 2023-11-21 PROCEDURE — 36415 COLL VENOUS BLD VENIPUNCTURE: CPT

## 2023-11-21 PROCEDURE — 82024 ASSAY OF ACTH: CPT | Performed by: INTERNAL MEDICINE

## 2023-11-21 PROCEDURE — 80053 COMPREHEN METABOLIC PANEL: CPT | Performed by: INTERNAL MEDICINE

## 2023-11-21 PROCEDURE — 85007 BL SMEAR W/DIFF WBC COUNT: CPT

## 2023-11-23 LAB — ACTH PLAS-MCNC: 49.9 PG/ML (ref 7.2–63.3)

## 2023-11-24 ENCOUNTER — ANCILLARY PROCEDURE (OUTPATIENT)
Dept: RADIOLOGY | Facility: CLINIC | Age: 78
End: 2023-11-24
Payer: MEDICARE

## 2023-11-24 DIAGNOSIS — C34.90 MALIGNANT NEOPLASM OF UNSPECIFIED PART OF UNSPECIFIED BRONCHUS OR LUNG (MULTI): Primary | ICD-10-CM

## 2023-11-24 PROCEDURE — 71260 CT THORAX DX C+: CPT | Performed by: RADIOLOGY

## 2023-11-24 PROCEDURE — 74177 CT ABD & PELVIS W/CONTRAST: CPT

## 2023-11-24 PROCEDURE — 74177 CT ABD & PELVIS W/CONTRAST: CPT | Performed by: RADIOLOGY

## 2023-11-24 PROCEDURE — 2550000001 HC RX 255 CONTRASTS: Performed by: NURSE PRACTITIONER

## 2023-11-24 RX ADMIN — IOHEXOL 75 ML: 350 INJECTION, SOLUTION INTRAVENOUS at 09:51

## 2023-11-29 ENCOUNTER — LAB (OUTPATIENT)
Dept: LAB | Facility: CLINIC | Age: 78
End: 2023-11-29
Payer: MEDICARE

## 2023-11-29 ENCOUNTER — OFFICE VISIT (OUTPATIENT)
Dept: HEMATOLOGY/ONCOLOGY | Facility: CLINIC | Age: 78
End: 2023-11-29
Payer: MEDICARE

## 2023-11-29 VITALS
HEART RATE: 88 BPM | WEIGHT: 122.36 LBS | DIASTOLIC BLOOD PRESSURE: 61 MMHG | BODY MASS INDEX: 21 KG/M2 | OXYGEN SATURATION: 100 % | SYSTOLIC BLOOD PRESSURE: 106 MMHG | RESPIRATION RATE: 18 BRPM | TEMPERATURE: 97.7 F

## 2023-11-29 DIAGNOSIS — R53.83 FATIGUE, UNSPECIFIED TYPE: ICD-10-CM

## 2023-11-29 DIAGNOSIS — C34.90 MALIGNANT NEOPLASM OF UNSPECIFIED PART OF UNSPECIFIED BRONCHUS OR LUNG (MULTI): ICD-10-CM

## 2023-11-29 DIAGNOSIS — D72.820 LYMPHOCYTOSIS: ICD-10-CM

## 2023-11-29 DIAGNOSIS — D72.820 LYMPHOCYTOSIS: Primary | ICD-10-CM

## 2023-11-29 DIAGNOSIS — F41.9 ANXIETY: ICD-10-CM

## 2023-11-29 PROCEDURE — 36415 COLL VENOUS BLD VENIPUNCTURE: CPT | Mod: TC

## 2023-11-29 PROCEDURE — 3078F DIAST BP <80 MM HG: CPT | Performed by: INTERNAL MEDICINE

## 2023-11-29 PROCEDURE — 88189 FLOWCYTOMETRY/READ 16 & >: CPT | Performed by: INTERNAL MEDICINE

## 2023-11-29 PROCEDURE — 1159F MED LIST DOCD IN RCRD: CPT | Performed by: INTERNAL MEDICINE

## 2023-11-29 PROCEDURE — 99215 OFFICE O/P EST HI 40 MIN: CPT | Performed by: INTERNAL MEDICINE

## 2023-11-29 PROCEDURE — 1160F RVW MEDS BY RX/DR IN RCRD: CPT | Performed by: INTERNAL MEDICINE

## 2023-11-29 PROCEDURE — 3074F SYST BP LT 130 MM HG: CPT | Performed by: INTERNAL MEDICINE

## 2023-11-29 PROCEDURE — 99215 OFFICE O/P EST HI 40 MIN: CPT | Mod: PO,25 | Performed by: INTERNAL MEDICINE

## 2023-11-29 PROCEDURE — 1126F AMNT PAIN NOTED NONE PRSNT: CPT | Performed by: INTERNAL MEDICINE

## 2023-11-29 PROCEDURE — 88185 FLOWCYTOMETRY/TC ADD-ON: CPT | Mod: TC | Performed by: INTERNAL MEDICINE

## 2023-11-29 RX ORDER — ALPRAZOLAM 0.5 MG/1
0.5 TABLET ORAL 3 TIMES DAILY PRN
Qty: 90 TABLET | Refills: 0 | Status: SHIPPED | OUTPATIENT
Start: 2023-11-29 | End: 2024-01-16 | Stop reason: SDUPTHER

## 2023-11-29 ASSESSMENT — PATIENT HEALTH QUESTIONNAIRE - PHQ9
SUM OF ALL RESPONSES TO PHQ9 QUESTIONS 1 AND 2: 0
2. FEELING DOWN, DEPRESSED OR HOPELESS: NOT AT ALL
1. LITTLE INTEREST OR PLEASURE IN DOING THINGS: NOT AT ALL

## 2023-11-29 ASSESSMENT — COLUMBIA-SUICIDE SEVERITY RATING SCALE - C-SSRS
2. HAVE YOU ACTUALLY HAD ANY THOUGHTS OF KILLING YOURSELF?: NO
1. IN THE PAST MONTH, HAVE YOU WISHED YOU WERE DEAD OR WISHED YOU COULD GO TO SLEEP AND NOT WAKE UP?: NO
6. HAVE YOU EVER DONE ANYTHING, STARTED TO DO ANYTHING, OR PREPARED TO DO ANYTHING TO END YOUR LIFE?: NO

## 2023-11-29 ASSESSMENT — ENCOUNTER SYMPTOMS
OCCASIONAL FEELINGS OF UNSTEADINESS: 0
LOSS OF SENSATION IN FEET: 0
DEPRESSION: 0

## 2023-11-29 ASSESSMENT — PAIN SCALES - GENERAL: PAINLEVEL: 0-NO PAIN

## 2023-11-29 NOTE — PROGRESS NOTES
Patient ID: Herlinda Springer is a 77 y.o. female    Primary Care Provider: Alley Bui MD    DIAGNOSIS AND STAGING  Stage CLIFTON ([id number]) NSCLC (adenocarcinoma, TTF-1+, Napsin A+) of the RUL   Dx through left adrenal gland bx on 12/17/19     SITES OF DISEASE  Right upper lobe  Left adrenal gland   Brain - left parietal lobe      MOLECULAR GENOMICS  TP53 mutant   EGFR negative  ALK-1 negative   ROS1 negative  BRAF V600E negative  NTRK negative      PD-L1 TPS < 1%     PRIOR THERAPIES  Cycle # 1 carboplatin/pemetrexed/pembrolizumab - 01/15/20, cycle # 4 on 03/18/20  Starts maintenance pemetrexed/pembrolizumab on 04/08/20  Pemetrexed discontinued on 03/01/2023 due to decline GFR'   Last pembrolizumab dose on 08/02/2023     CURRENT THERAPY  Observation     CURRENT ONCOLOGICAL PROBLEMS  MRI brain 12/22/19 - 2 mm enhancing lesion left parietal lobe, unclear if metastasis - repeat MRI 06/26/20 showed resolution of that finding   CT 06/26/20 shows oligo-progression of RUL - left adrenal metastasis stable - s/p RT to the RUL - completed 08/19/20     HISTORY OF PRESENT ILLNESS  This is a former smoker (quit 25 years prior to dx)who presented with new onset respiratory sx (cough), decreased appetite and weight loss  (lost 13 lbs in 2 months).   Chest CT 11/07/19 showed a RUL nodule measuring 4 cm, mediastinal adenopathy (level 2L measuring 1.8 cm, .   A PET scan obtained on 12/03/19 showed FDG uptake in the RUL nodule as well as 1R/1L nodes, bilateral mediastinal and right hilar node. Left adrenal gland was hypermetabolic, c/w metastasis.  On 12/17/19 CT-guided biopsy of the left adrenal gland confirmed the presence of a mucinous adenocarcinoma, TTF-1 positive, Napsin A and CK7 also positive. PD-L1 TPS < 1%, with no actionable mutations, TP53 mutant.   MRI brain 12/22/19 showed a 2 mm enhancement in left temporal lobe that is nonspecific but could be an additional metastatic site.   01/15/20: cycle # 1  carbo/pemetrexed/pembrolizumab   03/18/20: Cycle # 4 carbo/pemetrexed/pembrolizumab   04/08/20: starts maintenance pemetrexed/pembrolizumab   05/26/20: MRI brain shows no intracranial disease  05/26/20: CT C/A/P: increase in size of RUL mass - now measuring ~ 3.2 cm and stability of left adrenal gland - referred to Rad Onc for consideration of SBRT - continues pemetrexed/pembrolizumab maintenance   08/19/20:  completes RT to the RUL  08/20/20: grade 1 rash b/l UEs - treated with topical triamcinolone - continue pemetrexed + pembrolizumab  10/16/20: CT C/A/P: Increased consolidative changes  in the RUL c/w RT-induced fibrosis - no LAD. The adrenal glands look stable - the multiple lung GG opacifications are stable   11/27/20: CT C/A/P - improvement on RUL interstitial changes - there is no sign of PD. The RUL mass has decreased in size from 7 to 5 cm in greatest diameter - left adrenal gland is stable in size.   02/19/21: CT C/A/P personally reviewed by me: the RUL apical lesion that has been irradiated is further  decreased in size, now measuring 3.9 cm x 2 cm and previously on 11/24/21 measuring 4.8 cm x 2.4 cm (solid component). The left adrenal mass measures  3.4 cm and is stable in size. There are no new concerning metastatic lesions,. Multiple areas of GGO remain and are of no clinical significance in this patients with metastatic disease.   05/14/21: CT Chest/abdomen/pelvis personally reviewed by me. The RUL mass remains stable and so do the nodes. However, the GGO opacifications persist - the left adrenal gland is slightly smaller (3.6 cm << 3.8 cm). The right adrenal gland remains stable.      6/30/21: Will continue maintenance pembrolizumab and pemetrexed. Given CrCl, will dose Pemetrexed at 375 mg/m2 today.      08/06/21: CT C/A/P shows further development of interstitial changes in the RUL that look purely inflammatory in nature - there are no concerns for PD - no worsening LAD, the left adrenal mass  remains stable at 3.6 cm. This adrenal gland has been biopsied  at her dx      12/09/21: CT C/A/P shows SD - the RUL changes from prior RT remain stable - no new emerging LAD. Adrenal glands stable      12/15/21: dopplers LE negative for DVT      03/04/2022: CT Chest/Abdomen/Pelvis shows no signs of PD with the exception of left adrenal gland that continues to measure 3.4 cm however has developed an area of necrosis that is  concerning. Both RUL and LLL GGO remain stable measuring 2.7 cm and 2.8 cm respectively      03/25/2022: PET scan demonstrates close to metabolic complete remission, left adrenal gland SUV has decreased from 6.4 to 2.2     4/4/22-4/13/22: Completed SBRT to left adrenal gland.     06/17/2022: CT Chest/Abdomen/Pelvis demonstrates stable findings, stable size of left adrenal gland, stable GGO throughout bilateral lung fields      09/09/2022: CT CAP shows stable disease including stability of L adrenal gland; concern in liver noted on read reviewed and more consistent with fatty infiltration also seen on previous scans      10/07/2022: MRI brain demonstrates no intracranial metastasis.  Findings compatible with mastoiditis but subsequent CT scan ruled out mastoiditis, with a finding of osteopenia.     12/02/2022: CT CAP shows stable disease within the lung, L adrenal gland. Also noted stable pancreatic head & uncinate process lesions thought to represent IPMNs      02/20/2023: CT chest/abdomen/pelvis personally reviewed by me, demonstrating stable changes in the lungs as well as adrenal glands.  No new metastatic sites demonstrated.      03/01/2023: Discussion with patient regards to risks and benefits of continued single agent pembrolizumab in view of her issues with declining GFR associated with pemetrexed infusions.  Decision made to continue on single agent pembrolizumab every 3 weeks  for now     05/18/2023: CT CAP show stable disease without new metastatic sites      08/02/2023: Last  pembrolizumab dose    2023: CT chest/abdomen/pelvis demonstrating stability of findings with no new metastatic sites   Left adrenal measuring 3 cm in greatest diameter     PAST MEDICAL HISTORY  Asthma  HTN  Hypothyroidism   HLD  Glaucoma   Migraines     SURGICAL HISTORY  Anal fissures in her 30's  [age]illectomy 4/4 yo     SOCIAL HISTORY  Smoked 0.5 ppd from ages [age]  Drink red wine daily   Plays tennis 3 x/week - doubles  Retired  - worked at [employer] and   Speaks 4 languages (Irish, Hebrew, Polish and English)        FAMILY HISTORY  Maternal aunt  of metastatic cancer to the liver   Has one sister at age 84 who has Parkinson's  One sister  at age 73 of cirrhosis     CURRENT MEDS REVIEWED       ALLERGIES REVIEWED        SUBJECTIVE:  Feeling well  Fatigue is minimal   Appetite is preserved   No new respiratory sx  No new localized pain   No new neuro sx or headaches   She has noted new right ear fullness and is seeing ENT for that   No fevers or night sweats   Celebrating her 78th BD on , next week     A 13 point review of systems was performed, with significant findings documented above in subjective history.    OBJECTIVE:  Vitals:    23 0937   BP: 106/61   Pulse: 88   Resp: 18   Temp: 36.5 °C (97.7 °F)   SpO2: 100%      Body surface area is 1.58 meters squared.     Wt Readings from Last 5 Encounters:   23 55.8 kg (123 lb)   23 56.7 kg (125 lb)   23 56.8 kg (125 lb 3.5 oz)   23 56.7 kg (125 lb)   23 57.8 kg (127 lb 6.8 oz)       ECOGSCORE: 0- Fully active, able to carry on all pre-disease performance w/o restriction.    Physical Exam  Constitutional:       Appearance: Normal appearance. She is normal weight.   HENT:      Head: Normocephalic and atraumatic.   Eyes:      General: No scleral icterus.     Extraocular Movements: Extraocular movements intact.      Conjunctiva/sclera: Conjunctivae normal.   Cardiovascular:      Rate and Rhythm: Normal rate  and regular rhythm.      Heart sounds: Normal heart sounds.   Pulmonary:      Effort: Pulmonary effort is normal.      Breath sounds: Normal breath sounds.   Abdominal:      General: Abdomen is flat.      Palpations: Abdomen is soft. There is no mass.      Tenderness: There is no abdominal tenderness. There is no guarding.   Musculoskeletal:      Cervical back: Neck supple.   Skin:     General: Skin is warm and dry.   Neurological:      General: No focal deficit present.      Mental Status: She is alert and oriented to person, place, and time. Mental status is at baseline.      Motor: No weakness.      Gait: Gait normal.   Psychiatric:         Mood and Affect: Mood normal.         Behavior: Behavior normal.         Thought Content: Thought content normal.         Judgment: Judgment normal.        Diagnostic Results   Results:  Labs:  Lab Results   Component Value Date    WBC 22.3 (H) 11/21/2023    HGB 12.2 11/21/2023    HCT 37.6 11/21/2023    MCV 91 11/21/2023     11/21/2023      Lab Results   Component Value Date    NEUTROABS 4.19 06/12/2023        Lab Results   Component Value Date    GLUCOSE 89 11/21/2023    CALCIUM 8.8 11/21/2023     11/21/2023    K 4.0 11/21/2023    CO2 28 11/21/2023     11/21/2023    BUN 18 11/21/2023    CREATININE 0.84 11/21/2023    MG 2.20 11/21/2023     Lab Results   Component Value Date    ALT 14 11/21/2023    AST 17 11/21/2023    ALKPHOS 60 11/21/2023    BILITOT 0.3 11/21/2023      Lab Results   Component Value Date    ACTH 49.9 11/21/2023    CORTISOL 15.3 11/21/2023    TSH 1.00 11/21/2023    FREET4 1.26 09/18/2023       STUDY:  CT CHEST ABDOMEN PELVIS W IV CONTRAST;  11/24/2023 9:48 am      INDICATION:  77-year-old female with history of stage IV A 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 Underwent  radiation therapy to the right upper lobe in 08/2020. Most recently  on therapy with chemo and immunotherapy with last dose  of  pembrolizumab on 08/02/2023. Presents for restaging scan on  observation.      COMPARISON:  CT abdomen pelvis dated 08/25/2023, 05/18/2023, 12/02/2022,  05/14/2021.      ACCESSION NUMBER(S):  [id number]      ORDERING CLINICIAN:  SOFI CHRISTIANSEN      TECHNIQUE:  CT of the chest, abdomen, and pelvis was performed.  Contiguous axial  images were obtained at 3 mm slice thickness through the chest,  abdomen and pelvis. Coronal and sagittal reconstructions at 3 mm  slice thickness were performed.  75 ml of contrast Omnipaque 350 were administered intravenously  without immediate complication.      FINDINGS:  CHEST:      LUNG/PLEURA/LARGE AIRWAYS:  There is persistent collapse of the right upper lobe bronchi however  the trachea and remaining bronchi are otherwise patent.      Stable appearance of the right upper lobe collapse/consolidation with  adjacent architectural distortion and traction bronchiectasis in the  perihilar right upper lobe, consistent with post radiation change.  The scattered ground-glass and part solid ground-glass opacities in  the bilateral lungs are stable dating back to 12/02/2022. The largest  include a 2.6 x 2.2 cm ground-glass opacity with small central  solid-appearing component in the right upper lobe (series 202, image  77), a 3.3 x 2.2 cm ground-glass opacity in the left lower lobe  (Image 173), and a 2.3 x 1.5 cm ground-glass opacity in the left  lower lobe (Image 148). Numerous subcentimeter ground-glass nodules  throughout the bilateral lungs are also stable from the prior. No new  pulmonary nodules are identified.      VESSELS:  Aorta and pulmonary arteries are normal caliber. No atherosclerotic  changes of the aorta are identified.  No coronary artery  calcifications are present.      HEART:  The heart is normal in size.  No pericardial effusion      MEDIASTINUM AND MATTHEW:  Similar appearance of the soft tissue thickening in the right hilar  and right lower paratracheal region  consistent with post radiation  change. No mediastinal, hilar or axillary lymphadenopathy is present.  The esophagus is normal.      CHEST WALL AND LOWER NECK:  The soft tissues of the chest wall demonstrate no gross abnormality.  Stable subcentimeter calcified nodule in the left thyroid lobe.      ABDOMEN:      LIVER:  Liver is normal in size and enhancement. A small area of hypodensity  adjacent to the falciform ligament likely represents focal fatty  infiltration, stable from prior. No new liver lesions.      BILE DUCTS:  The intrahepatic and extrahepatic ducts are not dilated.      GALLBLADDER:  No calcified stones. No wall thickening.      PANCREAS:  A stable 0.8 cm hypodense lesion is noted in the uncinate process  (series 201, image 120) and a stable 0.4 cm hypodensity is present in  the pancreatic head (Image 107) are both not significantly changed  from 05/14/2021. No new pancreatic lesions are identified. Pancreatic  ductal dilatation.      SPLEEN:  Spleen is borderline enlarged measuring 12.7 cm in craniocaudal  length. No focal splenic lesions.      ADRENAL GLANDS:  Stable appearance of the left adrenal gland nodule measuring 3.2 x  1.3 cm. Stable right adrenal gland nodular thickening measuring  approximate 2.2 x 0.9 cm.      KIDNEYS AND URETERS:  The kidneys are normal in size and enhance symmetrically. Several  parapelvic cysts in the left kidney are again noted. No  hydroureteronephrosis or nephroureterolithiasis is identified.      PELVIS:      BLADDER:  The urinary bladder appears normal without abnormal wall thickening.      REPRODUCTIVE ORGANS:  Stable appearance of the lobulated and partially calcified right  adnexal mass which measures approximately 6.5 x 5.7 cm (previously  6.5 x 5.5 cm on 12/02/2022) and abuts the right aspect of the lower  uterine segment. Stable 4.2 x 3.2 cm hypoattenuating lesion in the  lower aspect of the uterus and additional 1.9 x 1.2 cm exophytic  lesion arising from  the fundus likely reflecting fibroids. Similar  appearance of the endometrial cavity which appears prominent and  contains hypoattenuating fluid/material.      BOWEL:  The stomach is unremarkable.  The small bowel is not abnormally  dilated. The large bowel demonstrates multiple diverticula without  inflammation.  The appendix appears normal.          VESSELS:  There is no aneurysmal dilatation of the abdominal aorta. There are  mild atherosclerotic calcifications of the abdominal aorta and its  branches. The IVC appears unremarkable.      PERITONEUM/RETROPERITONEUM/LYMPH NODES:  No ascites or free air, no fluid collection.  No abdominopelvic  lymphadenopathy is present.      BONE AND SOFT TISSUE:  No suspicious osseous lesions are identified. Stable 1.5 cm bone  island in the right ischium. Degenerative discogenic disease is noted  in the lower thoracic and lumbar spine. Chronic bilateral pars  interarticularis defects at L5 with grade 1 anterolisthesis of L5 on  S1, similar to prior. The abdominal wall soft tissues appear normal.      IMPRESSION:  Lung cancer restaging scan compared to 08/25/2023:  1. Stable postradiation changes in the right upper lobe. Additional  ground-glass and part solid nodules throughout the bilateral lungs  are also stable. No new lung nodules or ground-glass opacities.  2. Stable left adrenal mass and right adrenal gland nodular  thickening.  3. No new sites of metastatic disease in the chest, abdomen, or  pelvis.  4. Unchanged partially calcified right adnexal mass favoring a  pedunculated fibroid rather than an ovarian tumor.  5. Unchanged pancreatic hypodense lesions which may reflect IPMN. No  pancreatic ductal dilatation.    Assessment/Plan   Stage CLIFTON NSCLC - metastasis to the left adrenal gland  Scans from 11/24/23 demonstrating no signs of PD   Repeat in 3 months       She has demonstrated a persistent leukocytosis of unclear etiology - it is a lymphocytosis and may be Alvarez stage  0 CLL  Send blood for flow cytometry -       Left adrenal gland showing change in density concerning for necrosis/progression  -3/25/2022- PET scan demonstrates near complete metabolic remission, left adrenal gland SUV decreased from 6.4 to 2.2  Status post SBRT  04/04/2022-4/13/22  -Review of most recent scans demonstrates stability of appearance of left adrenal gland      History of recurrent migraines   - Avoid NSAIDs for her migraines - explained need for adequate kidney function for pemetrexed infusion.      Anxiety   - meditating, doing Yoga, Des and saleem Chi at [facility]  - uses Xanax as needed.        This note was created with the assistance of a speech recognition program.  Although the intention is to generate a document that actually reflects the content of the visit, it is possible that some mistakes occur and may not be corrected by the time of completion of this note.        Cathy Alva MD, MS  Thoracic Medical Oncology   [id number] [id number]  Phone: [phone]

## 2023-12-02 LAB
CELL COUNT (BLOOD): 23.06 X10*3/UL
CELL POPULATIONS: NORMAL
DIAGNOSIS: NORMAL
FLOW DIFFERENTIAL: NORMAL
FLOW TEST ORDERED: NORMAL
LAB TEST METHOD: NORMAL
NUMBER OF CELLS COLLECTED: NORMAL PER TUBE
PATH REPORT.TOTAL CANCER: NORMAL
SIGNATURE COMMENT: NORMAL
SPECIMEN VIABILITY: NORMAL

## 2023-12-07 ENCOUNTER — OFFICE VISIT (OUTPATIENT)
Dept: OPHTHALMOLOGY | Facility: CLINIC | Age: 78
End: 2023-12-07
Payer: MEDICARE

## 2023-12-07 DIAGNOSIS — H40.1131 PRIMARY OPEN-ANGLE GLAUCOMA, BILATERAL, MILD STAGE: Primary | ICD-10-CM

## 2023-12-07 DIAGNOSIS — Z96.1 PSEUDOPHAKIA OF BOTH EYES: ICD-10-CM

## 2023-12-07 PROCEDURE — 99213 OFFICE O/P EST LOW 20 MIN: CPT | Performed by: OPHTHALMOLOGY

## 2023-12-07 ASSESSMENT — SLIT LAMP EXAM - LIDS
COMMENTS: GOOD POSITION
COMMENTS: GOOD POSITION

## 2023-12-07 ASSESSMENT — CONF VISUAL FIELD
OD_INFERIOR_TEMPORAL_RESTRICTION: 0
OD_NORMAL: 1
OD_INFERIOR_NASAL_RESTRICTION: 0
OS_SUPERIOR_TEMPORAL_RESTRICTION: 0
OD_SUPERIOR_NASAL_RESTRICTION: 0
OS_SUPERIOR_NASAL_RESTRICTION: 0
OS_INFERIOR_NASAL_RESTRICTION: 0
OD_SUPERIOR_TEMPORAL_RESTRICTION: 0
OS_NORMAL: 1
OS_INFERIOR_TEMPORAL_RESTRICTION: 0

## 2023-12-07 ASSESSMENT — EXTERNAL EXAM - RIGHT EYE: OD_EXAM: NORMAL

## 2023-12-07 ASSESSMENT — PACHYMETRY
OD_CT(UM): 572
OS_CT(UM): 567

## 2023-12-07 ASSESSMENT — VISUAL ACUITY
OS_SC: 20/25
OD_SC: 20/25
METHOD: SNELLEN - LINEAR

## 2023-12-07 ASSESSMENT — TONOMETRY
OS_IOP_MMHG: 10
OD_IOP_MMHG: 10
IOP_METHOD: GOLDMANN APPLANATION

## 2023-12-07 ASSESSMENT — ENCOUNTER SYMPTOMS: EYES NEGATIVE: 1

## 2023-12-07 ASSESSMENT — EXTERNAL EXAM - LEFT EYE: OS_EXAM: NORMAL

## 2023-12-07 NOTE — PROGRESS NOTES
Visual Acuity (Snellen - Linear)         Right Left    Dist sc 20/25 20/25          Tonometry       Tonometry (Goldmann Applanation, 10:36 AM)         Right Left    Pressure 10 10                  Assessment/Plan   Last dilated:  4/6/23  Pt diagnosed with Stage 4 lung CA since Nov 2019 - doing well (just on Ketruda now)    1.  Early Primary Open-Angle Glaucoma OU:  /570.  IOP has some fluctuation, but there has been years of stable VFs and no evidence of progression.  Pt more comfortable OD off the zioptan.  s/p CE+IOL+Hydrus OD 6/7/22 (preop VA 20/60, IOP 12 mmHg).    s/p CE+IOL+Hydrus OS 7/20/22:  pre-op VA 20/30 glare to 20/50 and IOP 13 mmhg.      IOPs are well controlled without medication (previously tolerated zioptan)      Plan:  cont no Rx                f/u 4 months    2.  Pseudophakia (PCIOL) OU:  as above      Plan:  as above    3.  h/o narrow angles OU:  s/p LPI OS 4/17/21 and OD 6/25/21.  patent LPIs with deepening of angles      Plan:  monitor

## 2023-12-14 ENCOUNTER — OFFICE VISIT (OUTPATIENT)
Dept: OTOLARYNGOLOGY | Facility: CLINIC | Age: 78
End: 2023-12-14
Payer: MEDICARE

## 2023-12-14 VITALS — WEIGHT: 121 LBS | BODY MASS INDEX: 20.77 KG/M2

## 2023-12-14 DIAGNOSIS — H69.93 DYSFUNCTION OF BOTH EUSTACHIAN TUBES: ICD-10-CM

## 2023-12-14 DIAGNOSIS — J30.9 ALLERGIC RHINITIS, UNSPECIFIED SEASONALITY, UNSPECIFIED TRIGGER: ICD-10-CM

## 2023-12-14 PROCEDURE — 99213 OFFICE O/P EST LOW 20 MIN: CPT | Performed by: GENERAL PRACTICE

## 2023-12-14 PROCEDURE — 1159F MED LIST DOCD IN RCRD: CPT | Performed by: GENERAL PRACTICE

## 2023-12-14 PROCEDURE — 1160F RVW MEDS BY RX/DR IN RCRD: CPT | Performed by: GENERAL PRACTICE

## 2023-12-14 PROCEDURE — 1126F AMNT PAIN NOTED NONE PRSNT: CPT | Performed by: GENERAL PRACTICE

## 2023-12-14 RX ORDER — AZELASTINE 1 MG/ML
2 SPRAY, METERED NASAL 2 TIMES DAILY
Qty: 30 ML | Refills: 2 | Status: SHIPPED | OUTPATIENT
Start: 2023-12-14 | End: 2024-02-12 | Stop reason: ALTCHOICE

## 2023-12-14 NOTE — PROGRESS NOTES
Otolaryngology - Head and Neck Surgery Outpatient New Patient Visit Note        Assessment/Plan   Problem List Items Addressed This Visit    None  Visit Diagnoses         Codes    Dysfunction of both eustachian tubes     H69.93    Relevant Medications    azelastine (Astelin) 137 mcg (0.1 %) nasal spray    Other Relevant Orders    Referral to Audiology    Allergic rhinitis, unspecified seasonality, unspecified trigger     J30.9    Relevant Medications    azelastine (Astelin) 137 mcg (0.1 %) nasal spray              Recent right ear fullness, pulsatile tinnitus and ETD symptoms, improved with flonase and time.  Benign exam.  Worley to right with 512hz.  Discussed use of astelin rather than flonase due to history of glaucoma.    Will acquire audiogram.       Follow up:  -plan for follow up in clinic as needed    All of the above findings, impressions, treatment planning and follow up plans were discussed with the patient who indicated understanding.  the patient was instructed to contact or return to clinic sooner if symptoms/signs persist or worsen despite the above management.      Milan Mcnair MD  Otolaryngology - Head and Neck Surgery            History Of Present Illness  Herlinda Springer is a 78 y.o. female presenting for follow up of right ear fullness.     Reports onset of right ear pressure and some pulsatile tinnitus after dental procedure, as well as some congestion at the time.   Also noted some sound disortion and muffled hearing  Symptoms bothersome for several days, but have improved with time and use of flonase spray.    No otalgia/otorrhea.   Mild baseline congestion.              Past Medical History  She has a past medical history of Acute maxillary sinusitis, unspecified (09/04/2019), Acute upper respiratory infection, unspecified (01/16/2015), Acute upper respiratory infection, unspecified (02/15/2018), Benign neoplasm of pituitary gland (CMS/[facility]) (10/19/2017), Benign neoplasm of pituitary gland  (CMS/[facility]) (02/15/2018), Encounter for other preprocedural examination, Episodic paroxysmal hemicrania, not intractable (02/15/2018), Essential (primary) hypertension (02/15/2018), Laceration without foreign body of scalp, initial encounter (01/15/2014), Lobar pneumonia, unspecified organism (CMS/[facility]) (09/18/2019), Melanocytic nevi, unspecified (02/15/2018), Migraine with aura, not intractable, without status migrainosus (02/15/2018), Other conditions influencing health status (02/15/2018), Other conditions influencing health status (11/21/2014), Other conditions influencing health status (10/12/2015), Other specified disorders of bone density and structure, unspecified site (02/15/2018), Pain in left ankle and joints of left foot, Pain in left ankle and joints of left foot (05/11/2017), Pain in left knee, Pain in left knee (05/11/2017), Pain in left knee (02/15/2018), Personal history of other diseases of the musculoskeletal system and connective tissue (06/18/2015), Personal history of other diseases of the respiratory system (11/17/2014), Personal history of other diseases of the respiratory system (06/06/2018), Personal history of other diseases of the respiratory system (02/15/2018), Personal history of other endocrine, nutritional and metabolic disease (06/22/2016), Personal history of other specified conditions (09/16/2019), Personal history of other specified conditions (02/15/2018), Personal history of other specified conditions (01/13/2016), Preglaucoma, unspecified, bilateral (10/02/2015), Preglaucoma, unspecified, unspecified eye, Primary open-angle glaucoma, left eye, severe stage (10/26/2016), Primary open-angle glaucoma, right eye, moderate stage (04/21/2022), Rupture of popliteal cyst (06/18/2015), Rupture of popliteal cyst (02/15/2018), Unspecified asthma with (acute) exacerbation (09/16/2019), Unspecified asthma with (acute) exacerbation (06/06/2018), Unspecified blepharitis left eye, unspecified  eyelid (10/12/2015), and Unspecified glaucoma (02/15/2018).    Surgical History  She has a past surgical history that includes Tonsillectomy (08/01/2014) and Other surgical history (10/12/2015).     Social History  She reports that she has quit smoking. Her smoking use included cigarettes. She does not have any smokeless tobacco history on file. No history on file for alcohol use and drug use.    Family History  Family History   Problem Relation Name Age of Onset    Migraines Mother      Glaucoma Father          suspect        Allergies  Amoxicillin and Latex    Review of Systems  ROS: Pertinent positives as noted in HPI.    - CONSTITUTIONAL: Does not report weight loss, fever or chills.    - HEENT:   Ear: Does not report  , vertigo,    , otalgia, otorrhea  Nose: Does not report  ,  , epistaxis, decreased smell  Throat: Does not report pain, dysphagia, odynophagia  Larynx: Does not report hoarseness,  difficulty breathing, pain with speaking (odynophonia)  Neck: Does not report new masses, pain, swelling  Face: Does not report sinus pain, pressure, swelling, numbness, weakness     - RESPIRATORY: Does not report SOB or cough.    - CV: Does not report palpitations or chest pain.     - GI: Does not report abdominal pain, nausea, vomiting or diarrhea.    - : Does not report dysuria or urinary frequency.    - MSK: Does not report myalgia or joint pain.    - SKIN: Does not report rash or pruritus.    - NEUROLOGICAL: Does not report headache or syncope.    - PSYCHIATRIC: Does not report recent changes in mood. Does not report anxiety or depression.         Physical Exam:     GENERAL:   Alert & Oriented to person, place and time; Normal affect and appearance. Well developed and well nourished. Conversant & cooperative with examination.     HEAD:   Normocephalic, atraumatic. No sinus tenderness to palpation. Normal parotid bilaterally. Normal facial strength.     NEUROLOGIC:   Cranial nerves II-XII grossly intact, gait  WNL. Normal mood and affect.    EYES:   Extraocular movements intact. Pupils equal, round, reactive to light and accommodation. No nystagmus, no ptosis. no scleral injection.    EAR:   Normal auricle. No discomfort or TTP with manipulation.   Handheld otoscopic exam showed normal external auditory canals bilaterally. No purulence or EAC inflammation. Minimal cerumen.   Right tympanic membrane clear and mobile without evidence of perforation, retraction or middle ear effusion.   Left tympanic membrane clear and mobile without evidence of perforation, retraction or middle ear effusion.   Worley to the right with 512hz fork.  AC>BC b/l     NOSE:   No external deformity. No external nasal lesions, lacerations, or scars. Nasal tip symmetrical with normal nasal valves.   Nasal cavity with essentially midline septum, normal mucosa and turbinates. No lesions, masses, purulence or polyps.     OC/OP:   Mucous membranes moist, no masses, lesions or exudates.   Normal tongue, floor of mouth, teeth, gums, lips. Normal posterior pharyngeal wall.    Normal tonsils without erythema, exudate or obvious calculi     NECK:   No neck masses or thyroid enlargement. Trachea midline. No tenderness to palpation    LYMPHATIC:   No cervical lymphadenopathy.     RESPIRATORY:   Symmetric chest elevation & no retractions. No significant hoarseness. No increased work of breathing.    CV:   No clubbing or cyanosis. No obvious edema    Skin:   No facial rashes, vesicles or lesions.     Extremities:   No gross abnormalities      Clinic Procedure        Information review:  External sources (notes, imaging, lab results) listed below personally reviewed to aid in medical decision making process.  -  -  -

## 2023-12-18 ENCOUNTER — CLINICAL SUPPORT (OUTPATIENT)
Dept: AUDIOLOGY | Facility: CLINIC | Age: 78
End: 2023-12-18
Payer: MEDICARE

## 2023-12-18 DIAGNOSIS — H69.93 DYSFUNCTION OF BOTH EUSTACHIAN TUBES: ICD-10-CM

## 2023-12-18 DIAGNOSIS — H90.3 ASYMMETRICAL SENSORINEURAL HEARING LOSS: Primary | ICD-10-CM

## 2023-12-18 PROCEDURE — 92550 TYMPANOMETRY & REFLEX THRESH: CPT | Performed by: AUDIOLOGIST

## 2023-12-18 PROCEDURE — 92557 COMPREHENSIVE HEARING TEST: CPT | Performed by: AUDIOLOGIST

## 2023-12-18 ASSESSMENT — PAIN SCALES - GENERAL: PAINLEVEL_OUTOF10: 0 - NO PAIN

## 2023-12-18 ASSESSMENT — PAIN - FUNCTIONAL ASSESSMENT: PAIN_FUNCTIONAL_ASSESSMENT: 0-10

## 2023-12-18 NOTE — PROGRESS NOTES
AUDIOLOGY ADULT AUDIOMETRIC EVALUATION    Name:  Herlinda Springer  :  1945  Age:  78 y.o.  Date of Evaluation:  23  Time: 1550 - 1620    History:  Ms. Springer is seen today at the request of Milan Mcnair MD for an evaluation of hearing.      Trouble hearing in October after a dentist appt.  Woke up next day after dentist appt and felt pounding in right ear and some muffled hearing.  Felt that some water got into her right ear during course of dentist appt.  Self treated with flonase and hydrogen peroxide.  Hearing is better but still not as good as left ear.  No pain, noise exposure, drainage, dizziness  Some aural fullness right side    Per ENT notes 2023  Recent right ear fullness, pulsatile tinnitus and ETD symptoms, improved with flonase and time.  Benign exam.  Worley to right with 512hz.  Discussed use of astelin rather than flonase due to history of glaucoma.    Will acquire audiogram.     RESULTS:    Otoscopic Evaluation:      Immittance Measures: 226 Hz    Tympanograms   - Right ear Normal middle ear pressure, normal compliance, and normal volume (Type A)  - Left ear: Normal middle ear pressure, normal compliance, and normal volume (Type A)    Acoustic reflexes (Ipsilateral)  - Right ear: [x] 500 Hz [x] 1000 Hz [x] 2000 Hz [x] 4000 Hz  - Left ear:   [x] 500 Hz [x] 1000 Hz [x] 2000 Hz [x] 4000 Hz.    Test technique:  Pure Tone Audiometry     Reliability:   good    Pure Tone Audiometry:  - Normal hearing sloping to a mild sensorineural hearing loss bilaterally  - 10-15 dB asymmetry (left ear better) at 250, 2000, and 4000 Hz    Speech Audiometry:   - Right ear: 100 % at 55 dB HL, recorded NU6 words  - Left ear: 100 % at 55 dB HL, recorded NU6 words    IMPRESSIONS/RECOMMENDATIONS:  - Normal hearing sloping to a mild sensorineural hearing loss bilaterally  - 10-15 dB asymmetry (left ear better) at 250, 2000, and 4000 Hz  - Recommend follow-up ENT appt with Dr Mcnair given current otologic concerns  and asymmetry in audiometric thresholds.   - Annual audiogram to monitor hearing, or sooner if there are concerns re: hearing or if warranted by ENT     Viral Kaylah Ortiz, PhD  Audiologist /  of Otolaryngology

## 2024-01-15 ENCOUNTER — TELEPHONE (OUTPATIENT)
Dept: RADIATION ONCOLOGY | Facility: HOSPITAL | Age: 79
End: 2024-01-15
Payer: MEDICARE

## 2024-01-15 NOTE — TELEPHONE ENCOUNTER
Called pt to remind of appointment on 1/16/2024 at 1:30 Pt answered and will be present.    Pt is aware that the appointment is a phone/virtual visit, pt. understands that he/she doesn't have to show up in office.

## 2024-01-16 ENCOUNTER — TELEPHONE (OUTPATIENT)
Dept: ADMISSION | Facility: HOSPITAL | Age: 79
End: 2024-01-16
Payer: MEDICARE

## 2024-01-16 ENCOUNTER — HOSPITAL ENCOUNTER (OUTPATIENT)
Dept: RADIATION ONCOLOGY | Facility: HOSPITAL | Age: 79
Setting detail: RADIATION/ONCOLOGY SERIES
Discharge: HOME | End: 2024-01-16
Payer: MEDICARE

## 2024-01-16 DIAGNOSIS — F41.9 ANXIETY: ICD-10-CM

## 2024-01-16 DIAGNOSIS — C34.91 PRIMARY MALIGNANT NEOPLASM OF RIGHT LUNG METASTATIC TO OTHER SITE (MULTI): Primary | ICD-10-CM

## 2024-01-16 DIAGNOSIS — E27.8 ADRENAL MASS (MULTI): ICD-10-CM

## 2024-01-16 PROCEDURE — 99213 OFFICE O/P EST LOW 20 MIN: CPT | Performed by: NURSE PRACTITIONER

## 2024-01-16 RX ORDER — ALPRAZOLAM 0.5 MG/1
0.5 TABLET ORAL 3 TIMES DAILY PRN
Qty: 90 TABLET | Refills: 0 | Status: SHIPPED | OUTPATIENT
Start: 2024-01-16 | End: 2024-02-29 | Stop reason: SDUPTHER

## 2024-01-16 ASSESSMENT — ENCOUNTER SYMPTOMS
PSYCHIATRIC NEGATIVE: 1
RESPIRATORY NEGATIVE: 1
SHORTNESS OF BREATH: 0
HEMATOLOGIC/LYMPHATIC NEGATIVE: 1
FATIGUE: 0
ACTIVITY CHANGE: 0
APPETITE CHANGE: 0
MUSCULOSKELETAL NEGATIVE: 1
NEUROLOGICAL NEGATIVE: 1
COUGH: 0
CARDIOVASCULAR NEGATIVE: 1
WHEEZING: 0
DIAPHORESIS: 0
FEVER: 0
UNEXPECTED WEIGHT CHANGE: 0
CHILLS: 0

## 2024-01-16 NOTE — PROGRESS NOTES
Patient ID: [id number]   Cancer Staging:          Lung         AJCC Edition: 8th (AJCC), Diagnosis Date: 17-Dec-2019, [place], cT2a cN3 cM1b      Treatment Synopsis:    Mrs. Springer is a [age] yo female, former smoker (quit 25 years prior to dx)who presented with  new onset respiratory sx (cough), decreased appetite and weight loss (lost 13 lbs in 2 months).   Chest CT 11/07/19 showed a RUL nodule measuring 4 cm, mediastinal adenopathy (level 2L measuring 1.8 cm, .   A PET scan obtained on 12/03/19 showed FDG uptake in the RUL nodule as well as 1R/1L nodes, bilateral mediastinal and right hilar node. Left adrenal gland was hypermetabolic, c/w metastasis.  On 12/17/19 CT-guided biopsy of the left adrenal gland confirmed the presence of a mucinous adenocarcinoma, TTF-1 positive, Napsin A and CK7 also positive. PD-L1 TPS < 1%, with no actionable mutations, TP53 mutant.   MRI brain 12/22/19 showed a 2 mm enhancement in left temporal lobe that is nonspecific but could be an additional metastatic site.   01/15/20: cycle # 1 carbo/pemetrexed/pembrolizumab   03/18/20: Cycle # 4 carbo/pemetrexed/pembrolizumab   04/08/20: starts maintenance pemetrexed/pembrolizumab   05/26/20: MRI brain shows no intracranial disease  05/26/20: CT C/A/P: increase in size of RUL mass - now measuring ~ 3.2 cm and stability of left adrenal gland - referred to Rad Onc for consideration of SBRT - continues pemetrexed/pembrolizumab maintenance   08/19/20:  completes RT to the RUL  08/20/20: grade 1 rash b/l UEs - treated with topical triamcinolone - continue pemetrexed + pembrolizumab  10/16/20: CT C/A/P: Increased consolidative changes  in the RUL c/w RT-induced fibrosis - no LAD. The adrenal glands look stable - the multiple lung GG opacifications are stable   11/27/20: CT C/A/P - improvement on RUL interstitial changes - there is no sign of PD. The RUL mass has decreased in size from 7 to 5 cm in greatest diameter - left adrenal gland is stable in size.    02/19/21: CT C/A/P personally reviewed by me: the RUL apical lesion that has been irradiated is further  decreased in size, now measuring 3.9 cm x 2 cm and previously on 11/24/21 measuring 4.8 cm x 2.4 cm (solid component). The left adrenal mass measures  3.4 cm and is stable in size. There are no new concerning metastatic lesions,. Multiple areas of GGO remain and are of no clinical significance in this patients with metastatic disease.   05/14/21: CT Chest/abdomen/pelvis personally reviewed by me. The RUL mass remains stable and so do the nodes. However, the GGO opacifications persist - the left adrenal gland is slightly smaller (3.6 cm << 3.8 cm). The right adrenal gland remains stable.      6/30/21: Will continue maintenance pembrolizumab and pemetrexed. Given CrCl, will dose Pemetrexed at 375 mg/m2 today.      08/06/21: CT C/A/P shows further development of interstitial changes in the RUL that look purely inflammatory in nature - there are no concerns for PD - no worsening LAD, the left adrenal mass remains stable at 3.6 cm. This adrenal gland has been biopsied  at her dx      12/09/21: CT C/A/P shows SD - the RUL changes from prior RT remain stable - no new emerging LAD. Adrenal glands stable      12/15/21: dopplers LE negative for DVT      03/04/2022: CT Chest/Abdomen/Pelvis shows no signs of PD with the exception of left adrenal gland that continues to measure 3.4 cm however has developed an area of necrosis that is concerning. Both RUL and LLL GGO remain stable measuring 2.7 cm and 2.8  cm respectively      03/25/2022: PET scan demonstrates close to metabolic complete remission, left adrenal gland SUV has decreased from 6.4 to 2.2     4/4/22-4/13/22: Completed SBRT to left adrenal gland.     Last pembrolizumab dose on 08/02/2023 due to elevated WBC    History of presenting illness    Telehealth visit with Ms. Springer today. Patient is a 78 year old female who presents today for follow up 1 year and 9 months  s/p RT to the left adrenal gland. Pembro was discontinued due to elevated WBC count. There are no plans to resume.  She has follow up with Rose Casal, CNP for further work up.   Energy is baseline. Appetite good. Weight stable.  Breathing is baseline. Denies new cough, chest pain, back pain, fevers or worsening SOB. No neurological  complaints.  CT CAP scan done in November shows stable disease. Next scan is scheduled for February.      Review of systems:  Review of Systems   Constitutional:  Negative for activity change, appetite change, chills, diaphoresis, fatigue, fever and unexpected weight change.   HENT: Negative.     Respiratory: Negative.  Negative for cough, shortness of breath and wheezing.    Cardiovascular: Negative.    Musculoskeletal: Negative.    Skin: Negative.    Neurological: Negative.    Hematological: Negative.    Psychiatric/Behavioral: Negative.         Past Medical history  She  has a past surgical history that includes Tonsillectomy (08/01/2014) and Other surgical history (10/12/2015).        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 Underwent  radiation therapy to the right upper lobe in 08/2020. Most recently  on therapy with chemo and immunotherapy with last dose of  pembrolizumab on 08/02/2023. Presents for restaging scan on  observation.    COMPARISON:  CT abdomen pelvis dated 08/25/2023, 05/18/2023, 12/02/2022,  05/14/2021.    ACCESSION NUMBER(S):  [id number]    ORDERING CLINICIAN:  SOFI CHRISTIANSEN    TECHNIQUE:  CT of the chest, abdomen, and pelvis was performed.  Contiguous axial  images were obtained at 3 mm slice thickness through the chest,  abdomen and pelvis. Coronal and sagittal  reconstructions at 3 mm  slice thickness were performed.  75 ml of contrast Omnipaque 350 were administered intravenously  without immediate complication.    FINDINGS:  CHEST:    LUNG/PLEURA/LARGE AIRWAYS:  There is persistent collapse of the right upper lobe bronchi however  the trachea and remaining bronchi are otherwise patent.    Stable appearance of the right upper lobe collapse/consolidation with  adjacent architectural distortion and traction bronchiectasis in the  perihilar right upper lobe, consistent with post radiation change.  The scattered ground-glass and part solid ground-glass opacities in  the bilateral lungs are stable dating back to 12/02/2022. The largest  include a 2.6 x 2.2 cm ground-glass opacity with small central  solid-appearing component in the right upper lobe (series 202, image  77), a 3.3 x 2.2 cm ground-glass opacity in the left lower lobe  (Image 173), and a 2.3 x 1.5 cm ground-glass opacity in the left  lower lobe (Image 148). Numerous subcentimeter ground-glass nodules  throughout the bilateral lungs are also stable from the prior. No new  pulmonary nodules are identified.    VESSELS:  Aorta and pulmonary arteries are normal caliber. No atherosclerotic  changes of the aorta are identified.  No coronary artery  calcifications are present.    HEART:  The heart is normal in size.  No pericardial effusion    MEDIASTINUM AND MATTHEW:  Similar appearance of the soft tissue thickening in the right hilar  and right lower paratracheal region consistent with post radiation  change. No mediastinal, hilar or axillary lymphadenopathy is present.  The esophagus is normal.    CHEST WALL AND LOWER NECK:  The soft tissues of the chest wall demonstrate no gross abnormality.  Stable subcentimeter calcified nodule in the left thyroid lobe.    ABDOMEN:    LIVER:  Liver is normal in size and enhancement. A small area of hypodensity  adjacent to the falciform ligament likely represents focal  fatty  infiltration, stable from prior. No new liver lesions.    BILE DUCTS:  The intrahepatic and extrahepatic ducts are not dilated.    GALLBLADDER:  No calcified stones. No wall thickening.    PANCREAS:  A stable 0.8 cm hypodense lesion is noted in the uncinate process  (series 201, image 120) and a stable 0.4 cm hypodensity is present in  the pancreatic head (Image 107) are both not significantly changed  from 05/14/2021. No new pancreatic lesions are identified. Pancreatic  ductal dilatation.    SPLEEN:  Spleen is borderline enlarged measuring 12.7 cm in craniocaudal  length. No focal splenic lesions.    ADRENAL GLANDS:  Stable appearance of the left adrenal gland nodule measuring 3.2 x  1.3 cm. Stable right adrenal gland nodular thickening measuring  approximate 2.2 x 0.9 cm.    KIDNEYS AND URETERS:  The kidneys are normal in size and enhance symmetrically. Several  parapelvic cysts in the left kidney are again noted. No  hydroureteronephrosis or nephroureterolithiasis is identified.    PELVIS:    BLADDER:  The urinary bladder appears normal without abnormal wall thickening.    REPRODUCTIVE ORGANS:  Stable appearance of the lobulated and partially calcified right  adnexal mass which measures approximately 6.5 x 5.7 cm (previously  6.5 x 5.5 cm on 12/02/2022) and abuts the right aspect of the lower  uterine segment. Stable 4.2 x 3.2 cm hypoattenuating lesion in the  lower aspect of the uterus and additional 1.9 x 1.2 cm exophytic  lesion arising from the fundus likely reflecting fibroids. Similar  appearance of the endometrial cavity which appears prominent and  contains hypoattenuating fluid/material.    BOWEL:  The stomach is unremarkable.  The small bowel is not abnormally  dilated. The large bowel demonstrates multiple diverticula without  inflammation.  The appendix appears normal.      VESSELS:  There is no aneurysmal dilatation of the abdominal aorta. There are  mild atherosclerotic calcifications of the  abdominal aorta and its  branches. The IVC appears unremarkable.    PERITONEUM/RETROPERITONEUM/LYMPH NODES:  No ascites or free air, no fluid collection.  No abdominopelvic  lymphadenopathy is present.    BONE AND SOFT TISSUE:  No suspicious osseous lesions are identified. Stable 1.5 cm bone  island in the right ischium. Degenerative discogenic disease is noted  in the lower thoracic and lumbar spine. Chronic bilateral pars  interarticularis defects at L5 with grade 1 anterolisthesis of L5 on  S1, similar to prior. The abdominal wall soft tissues appear normal.    Impression  Lung cancer restaging scan compared to 08/25/2023:  1. Stable postradiation changes in the right upper lobe. Additional  ground-glass and part solid nodules throughout the bilateral lungs  are also stable. No new lung nodules or ground-glass opacities.  2. Stable left adrenal mass and right adrenal gland nodular  thickening.  3. No new sites of metastatic disease in the chest, abdomen, or  pelvis.  4. Unchanged partially calcified right adnexal mass favoring a  pedunculated fibroid rather than an ovarian tumor.  5. Unchanged pancreatic hypodense lesions which may reflect IPMN. No  pancreatic ductal dilatation.    I personally reviewed the images/study and I agree with the findings  as stated by resident physician Dr. Tapan Mckenzie.    MACRO:  None    Signed by: Anthony Simpson 11/25/2023 1:12 PM  Dictation workstation:   [id number]         Plan:  Assessment/Plan   78 year old female 1 year and 9 months s/p RT to the left adrenal gland for metastatic lung cancer. Doing well form a lung cancer standpoint. CT CAP from November shows  stable disease. No acute complaints related to treatment. No longer on immunotherapy due to elevated WBC counts.  Continue imaging and follow up with Dr. Alva as scheduled. Patient to return to clinic in 4 months unless needs to be seen sooner. Instructed  to call with any questions or concerns.

## 2024-01-16 NOTE — TELEPHONE ENCOUNTER
Pt called requesting a refill on her Xanax 0.5mg to be sent to [facility] on file. Message sent to the team.

## 2024-01-17 ENCOUNTER — APPOINTMENT (OUTPATIENT)
Dept: OTOLARYNGOLOGY | Facility: CLINIC | Age: 79
End: 2024-01-17
Payer: MEDICARE

## 2024-01-18 RX ORDER — FOLIC ACID 1 MG/1
1 TABLET ORAL DAILY
Qty: 90 TABLET | Refills: 0 | Status: CANCELLED | OUTPATIENT
Start: 2024-01-18 | End: 2024-04-17

## 2024-01-18 NOTE — TELEPHONE ENCOUNTER
Per Dr. Alva, pt does not need Folic Acid as she is not on Pemetrexed.    Attempted to call patient back to communicate the plan of care. Detailed message with call back number left on identified line.

## 2024-01-18 NOTE — TELEPHONE ENCOUNTER
Refill request received for Folic Acid 1mg; 90-day quantity requested.  Preferred pharmacy is [facility] in [place].  Medication pended to team.

## 2024-02-09 ENCOUNTER — DOCUMENTATION (OUTPATIENT)
Dept: HEMATOLOGY/ONCOLOGY | Facility: HOSPITAL | Age: 79
End: 2024-02-09
Payer: MEDICARE

## 2024-02-09 DIAGNOSIS — C85.90 LYMPHOMA, UNSPECIFIED BODY REGION, UNSPECIFIED LYMPHOMA TYPE (MULTI): ICD-10-CM

## 2024-02-09 NOTE — PROGRESS NOTES
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 Patient had been scheduled with hematology. We have rescheduled her with Dr. Huang for 2/12/24. Patient is aware. Iam cohen

## 2024-02-11 DIAGNOSIS — C91.10 CLL (CHRONIC LYMPHOCYTIC LEUKEMIA) (MULTI): Primary | ICD-10-CM

## 2024-02-12 ENCOUNTER — OFFICE VISIT (OUTPATIENT)
Dept: HEMATOLOGY/ONCOLOGY | Facility: HOSPITAL | Age: 79
End: 2024-02-12
Payer: MEDICARE

## 2024-02-12 ENCOUNTER — LAB (OUTPATIENT)
Dept: LAB | Facility: HOSPITAL | Age: 79
End: 2024-02-12
Payer: MEDICARE

## 2024-02-12 VITALS
SYSTOLIC BLOOD PRESSURE: 136 MMHG | OXYGEN SATURATION: 100 % | WEIGHT: 124.56 LBS | RESPIRATION RATE: 18 BRPM | TEMPERATURE: 97.2 F | HEART RATE: 100 BPM | BODY MASS INDEX: 21.38 KG/M2 | DIASTOLIC BLOOD PRESSURE: 62 MMHG

## 2024-02-12 DIAGNOSIS — C85.90 NON-HODGKIN'S LYMPHOMA, UNSPECIFIED BODY REGION, UNSPECIFIED NON-HODGKIN LYMPHOMA TYPE (MULTI): ICD-10-CM

## 2024-02-12 DIAGNOSIS — C85.90 LYMPHOMA, UNSPECIFIED BODY REGION, UNSPECIFIED LYMPHOMA TYPE (MULTI): ICD-10-CM

## 2024-02-12 DIAGNOSIS — C34.11 PRIMARY MALIGNANT NEOPLASM OF RIGHT UPPER LOBE OF LUNG (MULTI): Primary | ICD-10-CM

## 2024-02-12 DIAGNOSIS — C91.10 CLL (CHRONIC LYMPHOCYTIC LEUKEMIA) (MULTI): ICD-10-CM

## 2024-02-12 LAB
ALBUMIN SERPL BCP-MCNC: 4.3 G/DL (ref 3.4–5)
ALP SERPL-CCNC: 79 U/L (ref 33–136)
ALT SERPL W P-5'-P-CCNC: 12 U/L (ref 7–45)
ANION GAP SERPL CALC-SCNC: 14 MMOL/L (ref 10–20)
AST SERPL W P-5'-P-CCNC: 16 U/L (ref 9–39)
B2 MICROGLOB SERPL-MCNC: 2.9 MG/L (ref 0.7–2.2)
BASOPHILS # BLD AUTO: 0.08 X10*3/UL (ref 0–0.1)
BASOPHILS NFR BLD AUTO: 0.2 %
BILIRUB SERPL-MCNC: 0.4 MG/DL (ref 0–1.2)
BUN SERPL-MCNC: 17 MG/DL (ref 6–23)
BURR CELLS BLD QL SMEAR: NORMAL
CALCIUM SERPL-MCNC: 9.3 MG/DL (ref 8.6–10.3)
CHLORIDE SERPL-SCNC: 103 MMOL/L (ref 98–107)
CO2 SERPL-SCNC: 25 MMOL/L (ref 21–32)
CREAT SERPL-MCNC: 0.87 MG/DL (ref 0.5–1.05)
DACRYOCYTES BLD QL SMEAR: NORMAL
EGFRCR SERPLBLD CKD-EPI 2021: 67 ML/MIN/1.73M*2
EOSINOPHIL # BLD AUTO: 0.33 X10*3/UL (ref 0–0.4)
EOSINOPHIL NFR BLD AUTO: 0.9 %
ERYTHROCYTE [DISTWIDTH] IN BLOOD BY AUTOMATED COUNT: 12.6 % (ref 11.5–14.5)
GLUCOSE SERPL-MCNC: 96 MG/DL (ref 74–99)
HCT VFR BLD AUTO: 37.7 % (ref 36–46)
HGB BLD-MCNC: 12.7 G/DL (ref 12–16)
IGA SERPL-MCNC: 103 MG/DL (ref 70–400)
IGG SERPL-MCNC: 721 MG/DL (ref 700–1600)
IGM SERPL-MCNC: 77 MG/DL (ref 40–230)
IMM GRANULOCYTES # BLD AUTO: 0.09 X10*3/UL (ref 0–0.5)
IMM GRANULOCYTES NFR BLD AUTO: 0.3 % (ref 0–0.9)
LDH SERPL L TO P-CCNC: 156 U/L (ref 84–246)
LYMPHOCYTES # BLD AUTO: 28.22 X10*3/UL (ref 0.8–3)
LYMPHOCYTES NFR BLD AUTO: 78.5 %
MCH RBC QN AUTO: 29.4 PG (ref 26–34)
MCHC RBC AUTO-ENTMCNC: 33.7 G/DL (ref 32–36)
MCV RBC AUTO: 87 FL (ref 80–100)
MONOCYTES # BLD AUTO: 1.83 X10*3/UL (ref 0.05–0.8)
MONOCYTES NFR BLD AUTO: 5.1 %
NEUTROPHILS # BLD AUTO: 5.38 X10*3/UL (ref 1.6–5.5)
NEUTROPHILS NFR BLD AUTO: 15 %
NRBC BLD-RTO: 0 /100 WBCS (ref 0–0)
OVALOCYTES BLD QL SMEAR: NORMAL
PLATELET # BLD AUTO: 185 X10*3/UL (ref 150–450)
POTASSIUM SERPL-SCNC: 3.7 MMOL/L (ref 3.5–5.3)
PROT SERPL-MCNC: 6.7 G/DL (ref 6.4–8.2)
PROT SERPL-MCNC: 7 G/DL (ref 6.4–8.2)
RBC # BLD AUTO: 4.32 X10*6/UL (ref 4–5.2)
RBC MORPH BLD: NORMAL
SODIUM SERPL-SCNC: 138 MMOL/L (ref 136–145)
WBC # BLD AUTO: 35.9 X10*3/UL (ref 4.4–11.3)

## 2024-02-12 PROCEDURE — 84155 ASSAY OF PROTEIN SERUM: CPT | Performed by: INTERNAL MEDICINE

## 2024-02-12 PROCEDURE — 81450 HL NEO GSAP 5-50DNA/DNA&RNA: CPT | Performed by: INTERNAL MEDICINE

## 2024-02-12 PROCEDURE — 3075F SYST BP GE 130 - 139MM HG: CPT | Performed by: INTERNAL MEDICINE

## 2024-02-12 PROCEDURE — 80053 COMPREHEN METABOLIC PANEL: CPT

## 2024-02-12 PROCEDURE — 86334 IMMUNOFIX E-PHORESIS SERUM: CPT | Performed by: INTERNAL MEDICINE

## 2024-02-12 PROCEDURE — 99215 OFFICE O/P EST HI 40 MIN: CPT | Performed by: INTERNAL MEDICINE

## 2024-02-12 PROCEDURE — 36415 COLL VENOUS BLD VENIPUNCTURE: CPT

## 2024-02-12 PROCEDURE — 85025 COMPLETE CBC W/AUTO DIFF WBC: CPT

## 2024-02-12 PROCEDURE — 84165 PROTEIN E-PHORESIS SERUM: CPT | Performed by: INTERNAL MEDICINE

## 2024-02-12 PROCEDURE — 81263 IGH VARI REGIONAL MUTATION: CPT | Performed by: INTERNAL MEDICINE

## 2024-02-12 PROCEDURE — 82784 ASSAY IGA/IGD/IGG/IGM EACH: CPT | Performed by: INTERNAL MEDICINE

## 2024-02-12 PROCEDURE — G0452 MOLECULAR PATHOLOGY INTERPR: HCPCS | Performed by: INTERNAL MEDICINE

## 2024-02-12 PROCEDURE — 86320 SERUM IMMUNOELECTROPHORESIS: CPT | Performed by: INTERNAL MEDICINE

## 2024-02-12 PROCEDURE — 82232 ASSAY OF BETA-2 PROTEIN: CPT | Performed by: INTERNAL MEDICINE

## 2024-02-12 PROCEDURE — 1159F MED LIST DOCD IN RCRD: CPT | Performed by: INTERNAL MEDICINE

## 2024-02-12 PROCEDURE — 1126F AMNT PAIN NOTED NONE PRSNT: CPT | Performed by: INTERNAL MEDICINE

## 2024-02-12 PROCEDURE — 83521 IG LIGHT CHAINS FREE EACH: CPT | Performed by: INTERNAL MEDICINE

## 2024-02-12 PROCEDURE — 3078F DIAST BP <80 MM HG: CPT | Performed by: INTERNAL MEDICINE

## 2024-02-12 PROCEDURE — 83615 LACTATE (LD) (LDH) ENZYME: CPT

## 2024-02-12 ASSESSMENT — PAIN SCALES - GENERAL: PAINLEVEL: 0-NO PAIN

## 2024-02-12 NOTE — PROGRESS NOTES
Patient ID: Herlinda Springer is a 78 y.o. female.    Diagnosis:   Problem List Items Addressed This Visit       Primary malignant neoplasm of right upper lobe of lung (CMS/HCC) - Primary    Current Assessment & Plan     Chest CT 11/07/19 showed a RUL nodule measuring 4 cm, mediastinal adenopathy (level 2L measuring 1.8 cm, .   A PET scan obtained on 12/03/19 showed FDG uptake in the RUL nodule as well as 1R/1L nodes, bilateral mediastinal and right hilar node. Left adrenal gland was hypermetabolic, c/w metastasis.  On 12/17/19 CT-guided biopsy of the left adrenal gland confirmed the presence of a mucinous adenocarcinoma, TTF-1 positive, Napsin A and CK7 also positive. PD-L1 TPS < 1%, with no actionable mutations, TP53 mutant.   MRI brain 12/22/19 showed a 2 mm enhancement in left temporal lobe that is nonspecific but could be an additional metastatic site.   01/15/20: cycle # 1 carbo/pemetrexed/pembrolizumab   03/18/20: Cycle # 4 carbo/pemetrexed/pembrolizumab   04/08/20: starts maintenance pemetrexed/pembrolizumab   05/26/20: MRI brain shows no intracranial disease  05/26/20: CT C/A/P: increase in size of RUL mass - now measuring ~ 3.2 cm and stability of left adrenal gland - referred to Rad Onc for consideration of SBRT - continues pemetrexed/pembrolizumab maintenance   08/19/20:  completes RT to the RUL  08/20/20: grade 1 rash b/l UEs - treated with topical triamcinolone - continue pemetrexed + pembrolizumab  10/16/20: CT C/A/P: Increased consolidative changes  in the RUL c/w RT-induced fibrosis - no LAD. The adrenal glands look stable - the multiple lung GG opacifications are stable   11/27/20: CT C/A/P - improvement on RUL interstitial changes - there is no sign of PD. The RUL mass has decreased in size from 7 to 5 cm in greatest diameter - left adrenal gland is stable in size.   02/19/21: CT C/A/P personally reviewed by me: the RUL apical lesion that has been irradiated is further  decreased in size, now  measuring 3.9 cm x 2 cm and previously on 11/24/21 measuring 4.8 cm x 2.4 cm (solid component). The left adrenal mass measures  3.4 cm and is stable in size. There are no new concerning metastatic lesions,. Multiple areas of GGO remain and are of no clinical significance in this patients with metastatic disease.   05/14/21: CT Chest/abdomen/pelvis personally reviewed by me. The RUL mass remains stable and so do the nodes. However, the GGO opacifications persist - the left adrenal gland is slightly smaller (3.6 cm << 3.8 cm). The right adrenal gland remains stable.      6/30/21: Will continue maintenance pembrolizumab and pemetrexed. Given CrCl, will dose Pemetrexed at 375 mg/m2 today.      08/06/21: CT C/A/P shows further development of interstitial changes in the RUL that look purely inflammatory in nature - there are no concerns for PD - no worsening LAD, the left adrenal mass remains stable at 3.6 cm. This adrenal gland has been biopsied  at her dx      12/09/21: CT C/A/P shows SD - the RUL changes from prior RT remain stable - no new emerging LAD. Adrenal glands stable      12/15/21: dopplers LE negative for DVT      03/04/2022: CT Chest/Abdomen/Pelvis shows no signs of PD with the exception of left adrenal gland that continues to measure 3.4 cm however has developed an area of necrosis that is  concerning. Both RUL and LLL GGO remain stable measuring 2.7 cm and 2.8 cm respectively      03/25/2022: PET scan demonstrates close to metabolic complete remission, left adrenal gland SUV has decreased from 6.4 to 2.2     4/4/22-4/13/22: Completed SBRT to left adrenal gland.     06/17/2022: CT Chest/Abdomen/Pelvis demonstrates stable findings, stable size of left adrenal gland, stable GGO throughout bilateral lung fields      09/09/2022: CT CAP shows stable disease including stability of L adrenal gland; concern in liver noted on read reviewed and more consistent with fatty infiltration also seen on previous scans       10/07/2022: MRI brain demonstrates no intracranial metastasis.  Findings compatible with mastoiditis but subsequent CT scan ruled out mastoiditis, with a finding of osteopenia.     12/02/2022: CT CAP shows stable disease within the lung, L adrenal gland. Also noted stable pancreatic head & uncinate process lesions thought to represent IPMNs      02/20/2023: CT chest/abdomen/pelvis personally reviewed by me, demonstrating stable changes in the lungs as well as adrenal glands.  No new metastatic sites demonstrated.      03/01/2023: Discussion with patient regards to risks and benefits of continued single agent pembrolizumab in view of her issues with declining GFR associated with pemetrexed infusions.  Decision made to continue on single agent pembrolizumab every 3 weeks  for now     05/18/2023: CT CAP show stable disease without new metastatic sites      08/02/2023: Last pembrolizumab dose    08/25/2023: CT chest/abdomen/pelvis demonstrating stability of findings with no new metastatic sites   Left adrenal measuring 3 cm in greatest diameter         NHL (non-Hodgkin's lymphoma) (CMS/HCC)    Current Assessment & Plan     - FDG-avid cervical and mediastinal LN noticed 11/2019, thought related to lung cancer mets, s/p carboplatin/pemetrexed and disappeared  - lymphocytosis on CBC noticed 9/2021  - PET/CT on 3/2022 no LAD, no bone marrow activity, normal spleen activity and size.   - CT AP 10/2023 new mildly enlarged spleen size 13cm  - 2/2024 worsening PB lymphocytosis: flow from PB: Immunophenotypic findings consistent with CD5 positive B cell lymphoproliferative disorder. Note: Although the cells are CD5 and CD23 positive, the cells lack CD43 and demonstrate moderate or strong expression of CD20, CD79b and surface light chain atypical for chronic lymphocytic leukemia. A CD5+ marginal zone or lymphoplasmacytic lymphoma is favored             Treatment:   Oncology History    No history exists.       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    Benign neoplasm of pituitary gland (CMS/[facility]) 02/15/2018    Microprolactinoma    Encounter for other preprocedural examination     Preoperative clearance    Episodic paroxysmal hemicrania, 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    Paroxysmal hemicrania    Essential (primary) hypertension 02/15/2018    White coat syndrome with hypertension    Laceration without foreign body of scalp, initial encounter 01/15/2014    Laceration of scalp    Lobar pneumonia, unspecified organism (CMS/[facility]) 09/18/2019    Lobar pneumonia    Melanocytic nevi, unspecified 02/15/2018    Benign mole    Migraine with aura, not intractable, without status migrainosus 02/15/2018    Classic migraine with aura    Other conditions influencing health status 02/15/2018    History of cough    Other conditions influencing health status 11/21/2014    History of cough    Other conditions influencing health status 10/12/2015    No significant past surgical history    Other specified disorders of bone density and structure, unspecified site 02/15/2018    Osteopenia    Pain in left ankle and joints of left foot     Chronic pain of left ankle    Pain in left ankle and joints of left foot 05/11/2017    Acute left ankle pain    Pain in left knee     Acute pain of left knee    Pain in left knee 05/11/2017    Acute pain of left knee    Pain in left knee 02/15/2018    Acute pain of left knee    Personal history of other diseases of the musculoskeletal system and connective tissue 06/18/2015    History of muscle pain    Personal history of other diseases of  the respiratory system 2014    History of acute sinusitis    Personal history of other diseases of the respiratory system 2018    History of acute bronchitis    Personal history of other diseases of the respiratory system 02/15/2018    History of acute sinusitis    Personal history of other endocrine, nutritional and metabolic disease 2016    History of vitamin D deficiency    Personal history of other specified conditions 2019    History of chronic cough    Personal history of other specified conditions 02/15/2018    History of shortness of breath    Personal history of other specified conditions 2016    History of shortness of breath    Preglaucoma, unspecified, bilateral 10/02/2015    Glaucoma suspect, both eyes    Preglaucoma, unspecified, unspecified eye     Glaucoma suspect    Primary open-angle glaucoma, left eye, severe stage 10/26/2016    Primary open angle glaucoma of left eye, severe stage    Primary open-angle glaucoma, right eye, moderate stage 2022    Primary open angle glaucoma of right eye, moderate stage    Rupture of popliteal cyst 2015    Ruptured Bakers cyst    Rupture of popliteal cyst 02/15/2018    Ruptured Bakers cyst    Unspecified asthma with (acute) exacerbation 2019    Acute asthma exacerbation    Unspecified asthma with (acute) exacerbation 2018    Acute asthma exacerbation    Unspecified blepharitis left eye, unspecified eyelid 10/12/2015    Blepharitis of left eye    Unspecified glaucoma 02/15/2018    Glaucoma       Surgical History:     Past Surgical History:   Procedure Laterality Date    OTHER SURGICAL HISTORY  10/12/2015    Anal Fissurectomy    TONSILLECTOMY  2014    Tonsillectomy        Family History:   No cancers in family, one sister age 90, sister  in  after falling.   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 no children. Light Former smoker as a younger adult, born in [place] emigrated in 1968.   Social History     Tobacco Use    Smoking status: Former     Types: Cigarettes      -------------------------------------------------------------------------------------------------------  Subjective       HPI    Ms Springer is a 77 yo female h/o stage IV NSCLC (RUL mass, LT adrenal mets, mediastinal and cervical LN) s/p carbo/pem/pem (carbo given in 2021, pembro finished 8/2023) and RT now with stable disease (on surveillance), lymphocytosis with flow showing a CD5 positive, CD23 positive lymphoproliferative disorder,  strong expression of CD20 and Ru834v and surface light chain atypical for CLL and  MZL vs LPL was favored, who was referred by thoracic oncologist Dr Alva for workup of lymphocytosis. Patient has had very mild leukocytosis 12K since 3/2023, however increase to 22.3 in November prompted further evaluation. She has preserved hgb and platelets.     Patient is here with her  today 2/12/24 for consultation regarding this new diagnosis.  She feels perfectly fine, denies fevers, sweats or weight loss. She gets regular CT scans to followup on her lung cancer which showed no lymphadenopathy, and borderline splenomegally at 12.7 cm.    Review of Systems   All other systems reviewed and are negative.     -------------------------------------------------------------------------------------------------------  Objective   BSA: There is no height or weight on file to calculate BSA.  There were no vitals taken for this visit.    Physical Exam  Constitutional:       Appearance: Normal appearance.      Comments: Fit and well looks a decade younger than stated age   HENT:      Head: Normocephalic and atraumatic.      Nose: Nose normal.   Eyes:      Extraocular Movements: Extraocular movements intact.       Conjunctiva/sclera: Conjunctivae normal.      Pupils: Pupils are equal, round, and reactive to light.   Cardiovascular:      Rate and Rhythm: Normal rate and regular rhythm.   Pulmonary:      Breath sounds: Normal breath sounds.   Chest:   Breasts:     Right: Normal.      Left: Normal.   Abdominal:      General: Abdomen is flat. Bowel sounds are normal.      Palpations: Abdomen is soft.   Musculoskeletal:         General: Normal range of motion.   Skin:     General: Skin is warm and dry.   Neurological:      General: No focal deficit present.      Mental Status: She is oriented to person, place, and time.   Psychiatric:         Mood and Affect: Mood normal.         Behavior: Behavior normal.         Thought Content: Thought content normal.         Performance Status:  Asymptomatic  -------------------------------------------------------------------------------------------------------  Assessment/Plan      Ms Springer is a 77 yo female h/o stage [place] NSCLC (RUL mass, LT adrenal mets, mediastinal and cervical LN) s/p carbo/pem/pem (carbo given in 2021, pembro finished 8/2023) and RT (lung mass and adrenal mass) now with stable disease (on surveillance), new onset CD5, CD23 lymphocytosis with flow c/f MZL vs LPL, HTN, hypothyroidism, asthma, who was referred by thoracic oncologist Dr Alva for workup of lymphocytosis.     Lymphocytosis first noticed in 3/2023 at 12k, but increased to 22.3 in November 2023.  She has preserved hgb and platelets  and is assymptomatic. ALC doubled in 2023. New borderline splenomegaly on CT in 10/2023.  PB flow showed CD5+ clonal ppl, atypical for CLL, mainly concern for MZL vs LPL. I explained to the patient and her  that these are generally indolent conditions, more common in older patient  and that need for treatment is generally based on trajectory of blood counts and development of cytopenias.  Repeat labs today show a significant increase in wbc from 22.3 2/12/24 to 35.9 today,  again with preserved hgb and platelets.  Additional testing sent today included focused lymphoma panel, IgHV mutation analysis and paraprotein analysis.  Patient is already scheduled for CT imaging the end of this month to followup on her lung cancer so we will see if splenomegally has progressed as well.  These results will determine need and type of therapy, likely b cell directed treatments such as rituxan or BTK inhibitors.     C: 3/5/2024 to review lab studies and for further treatment planning          -------------------------------------------------------------------------------------------------------

## 2024-02-12 NOTE — ASSESSMENT & PLAN NOTE
- FDG-avid cervical and mediastinal LN noticed 11/2019, thought related to lung cancer mets, s/p carboplatin/pemetrexed and disappeared  - lymphocytosis on CBC noticed 9/2021  - PET/CT on 3/2022 no LAD, no bone marrow activity, normal spleen activity and size.   - CT AP 10/2023 new mildly enlarged spleen size 13cm  - 2/2024 worsening PB lymphocytosis: flow from PB: Immunophenotypic findings consistent with CD5 positive B cell lymphoproliferative disorder. Note: Although the cells are CD5 and CD23 positive, the cells lack CD43 and demonstrate moderate or strong expression of CD20, CD79b and surface light chain atypical for chronic lymphocytic leukemia. A CD5+ marginal zone or lymphoplasmacytic lymphoma is favored

## 2024-02-12 NOTE — ASSESSMENT & PLAN NOTE
Chest CT 11/07/19 showed a RUL nodule measuring 4 cm, mediastinal adenopathy (level 2L measuring 1.8 cm, .   A PET scan obtained on 12/03/19 showed FDG uptake in the RUL nodule as well as 1R/1L nodes, bilateral mediastinal and right hilar node. Left adrenal gland was hypermetabolic, c/w metastasis.  On 12/17/19 CT-guided biopsy of the left adrenal gland confirmed the presence of a mucinous adenocarcinoma, TTF-1 positive, Napsin A and CK7 also positive. PD-L1 TPS < 1%, with no actionable mutations, TP53 mutant.   MRI brain 12/22/19 showed a 2 mm enhancement in left temporal lobe that is nonspecific but could be an additional metastatic site.   01/15/20: cycle # 1 carbo/pemetrexed/pembrolizumab   03/18/20: Cycle # 4 carbo/pemetrexed/pembrolizumab   04/08/20: starts maintenance pemetrexed/pembrolizumab   05/26/20: MRI brain shows no intracranial disease  05/26/20: CT C/A/P: increase in size of RUL mass - now measuring ~ 3.2 cm and stability of left adrenal gland - referred to Rad Onc for consideration of SBRT - continues pemetrexed/pembrolizumab maintenance   08/19/20:  completes RT to the RUL  08/20/20: grade 1 rash b/l UEs - treated with topical triamcinolone - continue pemetrexed + pembrolizumab  10/16/20: CT C/A/P: Increased consolidative changes  in the RUL c/w RT-induced fibrosis - no LAD. The adrenal glands look stable - the multiple lung GG opacifications are stable   11/27/20: CT C/A/P - improvement on RUL interstitial changes - there is no sign of PD. The RUL mass has decreased in size from 7 to 5 cm in greatest diameter - left adrenal gland is stable in size.   02/19/21: CT C/A/P personally reviewed by me: the RUL apical lesion that has been irradiated is further  decreased in size, now measuring 3.9 cm x 2 cm and previously on 11/24/21 measuring 4.8 cm x 2.4 cm (solid component). The left adrenal mass measures  3.4 cm and is stable in size. There are no new concerning metastatic lesions,. Multiple areas of  GGO remain and are of no clinical significance in this patients with metastatic disease.   05/14/21: CT Chest/abdomen/pelvis personally reviewed by me. The RUL mass remains stable and so do the nodes. However, the GGO opacifications persist - the left adrenal gland is slightly smaller (3.6 cm << 3.8 cm). The right adrenal gland remains stable.      6/30/21: Will continue maintenance pembrolizumab and pemetrexed. Given CrCl, will dose Pemetrexed at 375 mg/m2 today.      08/06/21: CT C/A/P shows further development of interstitial changes in the RUL that look purely inflammatory in nature - there are no concerns for PD - no worsening LAD, the left adrenal mass remains stable at 3.6 cm. This adrenal gland has been biopsied  at her dx      12/09/21: CT C/A/P shows SD - the RUL changes from prior RT remain stable - no new emerging LAD. Adrenal glands stable      12/15/21: dopplers LE negative for DVT      03/04/2022: CT Chest/Abdomen/Pelvis shows no signs of PD with the exception of left adrenal gland that continues to measure 3.4 cm however has developed an area of necrosis that is  concerning. Both RUL and LLL GGO remain stable measuring 2.7 cm and 2.8 cm respectively      03/25/2022: PET scan demonstrates close to metabolic complete remission, left adrenal gland SUV has decreased from 6.4 to 2.2     4/4/22-4/13/22: Completed SBRT to left adrenal gland.     06/17/2022: CT Chest/Abdomen/Pelvis demonstrates stable findings, stable size of left adrenal gland, stable GGO throughout bilateral lung fields      09/09/2022: CT CAP shows stable disease including stability of L adrenal gland; concern in liver noted on read reviewed and more consistent with fatty infiltration also seen on previous scans      10/07/2022: MRI brain demonstrates no intracranial metastasis.  Findings compatible with mastoiditis but subsequent CT scan ruled out mastoiditis, with a finding of osteopenia.     12/02/2022: CT CAP shows stable disease  within the lung, L adrenal gland. Also noted stable pancreatic head & uncinate process lesions thought to represent IPMNs      02/20/2023: CT chest/abdomen/pelvis personally reviewed by me, demonstrating stable changes in the lungs as well as adrenal glands.  No new metastatic sites demonstrated.      03/01/2023: Discussion with patient regards to risks and benefits of continued single agent pembrolizumab in view of her issues with declining GFR associated with pemetrexed infusions.  Decision made to continue on single agent pembrolizumab every 3 weeks  for now     05/18/2023: CT CAP show stable disease without new metastatic sites      08/02/2023: Last pembrolizumab dose    08/25/2023: CT chest/abdomen/pelvis demonstrating stability of findings with no new metastatic sites   Left adrenal measuring 3 cm in greatest diameter

## 2024-02-13 ENCOUNTER — TELEPHONE (OUTPATIENT)
Dept: HEMATOLOGY/ONCOLOGY | Facility: HOSPITAL | Age: 79
End: 2024-02-13
Payer: MEDICARE

## 2024-02-13 LAB
KAPPA LC SERPL-MCNC: 1.62 MG/DL (ref 0.33–1.94)
KAPPA LC/LAMBDA SER: 1.38 {RATIO} (ref 0.26–1.65)
LAMBDA LC SERPL-MCNC: 1.17 MG/DL (ref 0.57–2.63)

## 2024-02-13 NOTE — TELEPHONE ENCOUNTER
Herlinda calls today to inquire if she needs a FUV scheduled with Dr. Alva after her scan on 2/26.  Message sent to team.

## 2024-02-13 NOTE — TELEPHONE ENCOUNTER
Per Maxwell Camp:    I put her on for a virtual visit 2.29 at the end of the day with Dr. Alva. Can you let her know, but also confirm that she wanted a CT on 2.26 (Monday). She typically prefers scans on Fridays. If she gets it done Friday, then Dr. Alva can see her in person Monday 2.26. However clinic is closed Wednesday, hence the Main Virtual on Thursday. If she wants to re-schedule the CT to Friday, she can call Radiology scheduling, and then let me know and I can schedule her with Dr. Alva.     Called patient back to communicate the above plan. Patient verbalized understanding with teach back and is agreeable to the CT scan on 2/26 and the virtual visit on 2/29. No further needs at this time.    Team updated.

## 2024-02-15 LAB
ALBUMIN: 4.1 G/DL (ref 3.4–5)
ALPHA 1 GLOBULIN: 0.4 G/DL (ref 0.2–0.6)
ALPHA 2 GLOBULIN: 0.8 G/DL (ref 0.4–1.1)
BETA GLOBULIN: 0.7 G/DL (ref 0.5–1.2)
GAMMA GLOBULIN: 0.7 G/DL (ref 0.5–1.4)
IMMUNOFIXATION COMMENT: NORMAL
PATH REVIEW - SERUM IMMUNOFIXATION: NORMAL
PATH REVIEW-SERUM PROTEIN ELECTROPHORESIS: NORMAL
PROTEIN ELECTROPHORESIS COMMENT: NORMAL

## 2024-02-16 LAB
ELECTRONICALLY SIGNED BY: NORMAL
IGHV RESULTS: NORMAL

## 2024-02-21 LAB
ELECTRONICALLY SIGNED BY: NORMAL
LYMPHOID NGS RESULTS: NORMAL

## 2024-02-22 ENCOUNTER — LAB (OUTPATIENT)
Dept: LAB | Facility: LAB | Age: 79
End: 2024-02-22
Payer: MEDICARE

## 2024-02-22 ENCOUNTER — APPOINTMENT (OUTPATIENT)
Dept: HEMATOLOGY/ONCOLOGY | Facility: CLINIC | Age: 79
End: 2024-02-22
Payer: MEDICARE

## 2024-02-22 DIAGNOSIS — C34.90 MALIGNANT NEOPLASM OF UNSPECIFIED PART OF UNSPECIFIED BRONCHUS OR LUNG (MULTI): ICD-10-CM

## 2024-02-22 DIAGNOSIS — R53.83 FATIGUE, UNSPECIFIED TYPE: ICD-10-CM

## 2024-02-22 LAB
ALBUMIN SERPL BCP-MCNC: 4.2 G/DL (ref 3.4–5)
ALP SERPL-CCNC: 65 U/L (ref 33–136)
ALT SERPL W P-5'-P-CCNC: 12 U/L (ref 7–45)
ANION GAP SERPL CALC-SCNC: 16 MMOL/L (ref 10–20)
AST SERPL W P-5'-P-CCNC: 16 U/L (ref 9–39)
BASOPHILS # BLD MANUAL: 0 X10*3/UL (ref 0–0.1)
BASOPHILS NFR BLD MANUAL: 0 %
BILIRUB SERPL-MCNC: 0.4 MG/DL (ref 0–1.2)
BUN SERPL-MCNC: 16 MG/DL (ref 6–23)
CALCIUM SERPL-MCNC: 8.9 MG/DL (ref 8.6–10.6)
CHLORIDE SERPL-SCNC: 102 MMOL/L (ref 98–107)
CO2 SERPL-SCNC: 27 MMOL/L (ref 21–32)
CORTIS SERPL-MCNC: 18.2 UG/DL (ref 2.5–20)
CREAT SERPL-MCNC: 0.94 MG/DL (ref 0.5–1.05)
EGFRCR SERPLBLD CKD-EPI 2021: 61 ML/MIN/1.73M*2
EOSINOPHIL # BLD MANUAL: 0.25 X10*3/UL (ref 0–0.4)
EOSINOPHIL NFR BLD MANUAL: 0.9 %
GLUCOSE SERPL-MCNC: 171 MG/DL (ref 74–99)
LYMPHOCYTES # BLD MANUAL: 20.34 X10*3/UL (ref 0.8–3)
LYMPHOCYTES NFR BLD MANUAL: 73.7 %
MAGNESIUM SERPL-MCNC: 1.79 MG/DL (ref 1.6–2.4)
MONOCYTES # BLD MANUAL: 1.63 X10*3/UL (ref 0.05–0.8)
MONOCYTES NFR BLD MANUAL: 5.9 %
NEUTS SEG # BLD MANUAL: 3.97 X10*3/UL (ref 1.6–5)
NEUTS SEG NFR BLD MANUAL: 14.4 %
POTASSIUM SERPL-SCNC: 4.3 MMOL/L (ref 3.5–5.3)
PROT SERPL-MCNC: 6.1 G/DL (ref 6.4–8.2)
RBC MORPH BLD: ABNORMAL
SODIUM SERPL-SCNC: 141 MMOL/L (ref 136–145)
TOTAL CELLS COUNTED BLD: 118
TSH SERPL-ACNC: 0.94 MIU/L (ref 0.44–3.98)
VARIANT LYMPHS # BLD MANUAL: 1.41 X10*3/UL (ref 0–0.3)
VARIANT LYMPHS NFR BLD: 5.1 %

## 2024-02-22 PROCEDURE — 82533 TOTAL CORTISOL: CPT

## 2024-02-22 PROCEDURE — 83735 ASSAY OF MAGNESIUM: CPT

## 2024-02-22 PROCEDURE — 85027 COMPLETE CBC AUTOMATED: CPT

## 2024-02-22 PROCEDURE — 82024 ASSAY OF ACTH: CPT

## 2024-02-22 PROCEDURE — 36415 COLL VENOUS BLD VENIPUNCTURE: CPT

## 2024-02-22 PROCEDURE — 85060 BLOOD SMEAR INTERPRETATION: CPT | Performed by: INTERNAL MEDICINE

## 2024-02-22 PROCEDURE — 85007 BL SMEAR W/DIFF WBC COUNT: CPT

## 2024-02-22 PROCEDURE — 80053 COMPREHEN METABOLIC PANEL: CPT

## 2024-02-22 PROCEDURE — 84443 ASSAY THYROID STIM HORMONE: CPT

## 2024-02-23 LAB
ERYTHROCYTE [DISTWIDTH] IN BLOOD BY AUTOMATED COUNT: 12 % (ref 11.5–14.5)
HCT VFR BLD AUTO: 35.7 % (ref 36–46)
HGB BLD-MCNC: 11.7 G/DL (ref 12–16)
IMM GRANULOCYTES # BLD AUTO: 0.07 X10*3/UL (ref 0–0.5)
IMM GRANULOCYTES NFR BLD AUTO: 0.3 % (ref 0–0.9)
MCH RBC QN AUTO: 29.1 PG (ref 26–34)
MCHC RBC AUTO-ENTMCNC: 32.8 G/DL (ref 32–36)
MCV RBC AUTO: 89 FL (ref 80–100)
NRBC BLD-RTO: 0 /100 WBCS (ref 0–0)
PLATELET # BLD AUTO: 199 X10*3/UL (ref 150–450)
RBC # BLD AUTO: 4.02 X10*6/UL (ref 4–5.2)
WBC # BLD AUTO: 27.6 X10*3/UL (ref 4.4–11.3)

## 2024-02-24 LAB — ACTH PLAS-MCNC: 40.3 PG/ML (ref 7.2–63.3)

## 2024-02-26 ENCOUNTER — HOSPITAL ENCOUNTER (OUTPATIENT)
Dept: RADIOLOGY | Facility: CLINIC | Age: 79
Discharge: HOME | End: 2024-02-26
Payer: MEDICARE

## 2024-02-26 DIAGNOSIS — C34.90 MALIGNANT NEOPLASM OF UNSPECIFIED PART OF UNSPECIFIED BRONCHUS OR LUNG (MULTI): ICD-10-CM

## 2024-02-26 PROCEDURE — 71260 CT THORAX DX C+: CPT | Performed by: STUDENT IN AN ORGANIZED HEALTH CARE EDUCATION/TRAINING PROGRAM

## 2024-02-26 PROCEDURE — 71260 CT THORAX DX C+: CPT

## 2024-02-26 PROCEDURE — 74177 CT ABD & PELVIS W/CONTRAST: CPT | Performed by: STUDENT IN AN ORGANIZED HEALTH CARE EDUCATION/TRAINING PROGRAM

## 2024-02-26 PROCEDURE — 2550000001 HC RX 255 CONTRASTS: Performed by: INTERNAL MEDICINE

## 2024-02-26 RX ADMIN — IOHEXOL 75 ML: 350 INJECTION, SOLUTION INTRAVENOUS at 09:12

## 2024-02-27 LAB — PATH REVIEW-CBC DIFFERENTIAL: NORMAL

## 2024-02-29 ENCOUNTER — TELEMEDICINE (OUTPATIENT)
Dept: HEMATOLOGY/ONCOLOGY | Facility: HOSPITAL | Age: 79
End: 2024-02-29
Payer: MEDICARE

## 2024-02-29 DIAGNOSIS — F41.9 ANXIETY: ICD-10-CM

## 2024-02-29 DIAGNOSIS — C34.11 PRIMARY MALIGNANT NEOPLASM OF RIGHT UPPER LOBE OF LUNG (MULTI): Primary | ICD-10-CM

## 2024-02-29 PROCEDURE — 99213 OFFICE O/P EST LOW 20 MIN: CPT | Performed by: INTERNAL MEDICINE

## 2024-02-29 PROCEDURE — 1159F MED LIST DOCD IN RCRD: CPT | Performed by: INTERNAL MEDICINE

## 2024-02-29 PROCEDURE — 1126F AMNT PAIN NOTED NONE PRSNT: CPT | Performed by: INTERNAL MEDICINE

## 2024-02-29 RX ORDER — ALPRAZOLAM 0.5 MG/1
0.5 TABLET ORAL 3 TIMES DAILY PRN
Qty: 90 TABLET | Refills: 0 | Status: SHIPPED | OUTPATIENT
Start: 2024-02-29 | End: 2024-05-01 | Stop reason: SDUPTHER

## 2024-02-29 NOTE — PROGRESS NOTES
Patient ID: Herlinda Springer is a 78 y.o. female    Primary Care Provider: Alley Bui MD    DIAGNOSIS AND STAGING  Stage CLIFTON ([id number]) NSCLC (adenocarcinoma, TTF-1+, Napsin A+) of the RUL   Dx through left adrenal gland bx on 12/17/19     SITES OF DISEASE  Right upper lobe  Left adrenal gland   Brain - left parietal lobe      MOLECULAR GENOMICS  TP53 mutant   EGFR negative  ALK-1 negative   ROS1 negative  BRAF V600E negative  NTRK negative      PD-L1 TPS < 1%     PRIOR THERAPIES  Cycle # 1 carboplatin/pemetrexed/pembrolizumab - 01/15/20, cycle # 4 on 03/18/20  Starts maintenance pemetrexed/pembrolizumab on 04/08/20  Pemetrexed discontinued on 03/01/2023 due to decline GFR'   Last pembrolizumab dose on 08/02/2023     CURRENT THERAPY  Observation     CURRENT ONCOLOGICAL PROBLEMS  MRI brain 12/22/19 - 2 mm enhancing lesion left parietal lobe, unclear if metastasis - repeat MRI 06/26/20 showed resolution of that finding   CT 06/26/20 shows oligo-progression of RUL - left adrenal metastasis stable - s/p RT to the RUL - completed 08/19/20     HISTORY OF PRESENT ILLNESS  This is a former smoker (quit 25 years prior to dx)who presented with new onset respiratory sx (cough), decreased appetite and weight loss  (lost 13 lbs in 2 months).   Chest CT 11/07/19 showed a RUL nodule measuring 4 cm, mediastinal adenopathy (level 2L measuring 1.8 cm, .   A PET scan obtained on 12/03/19 showed FDG uptake in the RUL nodule as well as 1R/1L nodes, bilateral mediastinal and right hilar node. Left adrenal gland was hypermetabolic, c/w metastasis.  On 12/17/19 CT-guided biopsy of the left adrenal gland confirmed the presence of a mucinous adenocarcinoma, TTF-1 positive, Napsin A and CK7 also positive. PD-L1 TPS < 1%, with no actionable mutations, TP53 mutant.   MRI brain 12/22/19 showed a 2 mm enhancement in left temporal lobe that is nonspecific but could be an additional metastatic site.   01/15/20: cycle # 1  carbo/pemetrexed/pembrolizumab   03/18/20: Cycle # 4 carbo/pemetrexed/pembrolizumab   04/08/20: starts maintenance pemetrexed/pembrolizumab   05/26/20: MRI brain shows no intracranial disease  05/26/20: CT C/A/P: increase in size of RUL mass - now measuring ~ 3.2 cm and stability of left adrenal gland - referred to Rad Onc for consideration of SBRT - continues pemetrexed/pembrolizumab maintenance   08/19/20:  completes RT to the RUL  08/20/20: grade 1 rash b/l UEs - treated with topical triamcinolone - continue pemetrexed + pembrolizumab  10/16/20: CT C/A/P: Increased consolidative changes  in the RUL c/w RT-induced fibrosis - no LAD. The adrenal glands look stable - the multiple lung GG opacifications are stable   11/27/20: CT C/A/P - improvement on RUL interstitial changes - there is no sign of PD. The RUL mass has decreased in size from 7 to 5 cm in greatest diameter - left adrenal gland is stable in size.   02/19/21: CT C/A/P personally reviewed by me: the RUL apical lesion that has been irradiated is further  decreased in size, now measuring 3.9 cm x 2 cm and previously on 11/24/21 measuring 4.8 cm x 2.4 cm (solid component). The left adrenal mass measures  3.4 cm and is stable in size. There are no new concerning metastatic lesions,. Multiple areas of GGO remain and are of no clinical significance in this patients with metastatic disease.   05/14/21: CT Chest/abdomen/pelvis personally reviewed by me. The RUL mass remains stable and so do the nodes. However, the GGO opacifications persist - the left adrenal gland is slightly smaller (3.6 cm << 3.8 cm). The right adrenal gland remains stable.      6/30/21: Will continue maintenance pembrolizumab and pemetrexed. Given CrCl, will dose Pemetrexed at 375 mg/m2 today.      08/06/21: CT C/A/P shows further development of interstitial changes in the RUL that look purely inflammatory in nature - there are no concerns for PD - no worsening LAD, the left adrenal mass  remains stable at 3.6 cm. This adrenal gland has been biopsied  at her dx      12/09/21: CT C/A/P shows SD - the RUL changes from prior RT remain stable - no new emerging LAD. Adrenal glands stable      12/15/21: dopplers LE negative for DVT      03/04/2022: CT Chest/Abdomen/Pelvis shows no signs of PD with the exception of left adrenal gland that continues to measure 3.4 cm however has developed an area of necrosis that is  concerning. Both RUL and LLL GGO remain stable measuring 2.7 cm and 2.8 cm respectively      03/25/2022: PET scan demonstrates close to metabolic complete remission, left adrenal gland SUV has decreased from 6.4 to 2.2     4/4/22-4/13/22: Completed SBRT to left adrenal gland.     06/17/2022: CT Chest/Abdomen/Pelvis demonstrates stable findings, stable size of left adrenal gland, stable GGO throughout bilateral lung fields      09/09/2022: CT CAP shows stable disease including stability of L adrenal gland; concern in liver noted on read reviewed and more consistent with fatty infiltration also seen on previous scans      10/07/2022: MRI brain demonstrates no intracranial metastasis.  Findings compatible with mastoiditis but subsequent CT scan ruled out mastoiditis, with a finding of osteopenia.     12/02/2022: CT CAP shows stable disease within the lung, L adrenal gland. Also noted stable pancreatic head & uncinate process lesions thought to represent IPMNs      02/20/2023: CT chest/abdomen/pelvis personally reviewed by me, demonstrating stable changes in the lungs as well as adrenal glands.  No new metastatic sites demonstrated.      03/01/2023: Discussion with patient regards to risks and benefits of continued single agent pembrolizumab in view of her issues with declining GFR associated with pemetrexed infusions.  Decision made to continue on single agent pembrolizumab every 3 weeks  for now     05/18/2023: CT CAP show stable disease without new metastatic sites      08/02/2023: Last  pembrolizumab dose    2023: CT chest/abdomen/pelvis demonstrating stability of findings with no new metastatic sites   Left adrenal measuring 3 cm in greatest diameter     PAST MEDICAL HISTORY  Asthma  HTN  Hypothyroidism   HLD  Glaucoma   Migraines     SURGICAL HISTORY  Anal fissures in her 30's  T[age]illectomy 4/6 yo     SOCIAL HISTORY  Smoked 0.5 ppd from ages [age]  Drinks red wine daily   Plays tennis 3 x/week - doubles  Retired  - worked at [employer] and   Speaks 4 languages (Slovak, Chilean, Macedonian and English)        FAMILY HISTORY  Maternal aunt  of metastatic cancer to the liver   Has one sister at age 84 who has Parkinson's  One sister  at age 73 of cirrhosis     CURRENT MEDS REVIEWED       ALLERGIES REVIEWED        SUBJECTIVE:  Verbal consent obtained prior to this telehealth/phone appointment  Patient is doing well, undergoing workup for CLL with Dr. Sravani Huang  There has been no unintentional weight loss  Preserved stamina levels  No new localized pain of concern, including unexplained headaches or neurological symptoms    A 13 point review of systems was performed, with significant findings documented above in subjective history.    OBJECTIVE:  There were no vitals filed for this visit.     There is no height or weight on file to calculate BSA.     Wt Readings from Last 5 Encounters:   24 56.5 kg (124 lb 9 oz)   23 54.9 kg (121 lb)   23 55.5 kg (122 lb 5.7 oz)   23 55.8 kg (123 lb)   23 56.7 kg (125 lb)       ECOGSCORE: 0- Fully active, able to carry on all pre-disease performance w/o restriction.    Physical Exam  Constitutional:       Appearance: Normal appearance. She is normal weight.   HENT:      Head: Normocephalic and atraumatic.   Eyes:      General: No scleral icterus.     Extraocular Movements: Extraocular movements intact.      Conjunctiva/sclera: Conjunctivae normal.   Cardiovascular:      Rate and Rhythm: Normal rate and regular rhythm.       Heart sounds: Normal heart sounds.   Pulmonary:      Effort: Pulmonary effort is normal.      Breath sounds: Normal breath sounds.   Abdominal:      General: Abdomen is flat.      Palpations: Abdomen is soft. There is no mass.      Tenderness: There is no abdominal tenderness. There is no guarding.   Musculoskeletal:      Cervical back: Neck supple.   Skin:     General: Skin is warm and dry.   Neurological:      General: No focal deficit present.      Mental Status: She is alert and oriented to person, place, and time. Mental status is at baseline.      Motor: No weakness.      Gait: Gait normal.   Psychiatric:         Mood and Affect: Mood normal.         Behavior: Behavior normal.         Thought Content: Thought content normal.         Judgment: Judgment normal.          Diagnostic Results   Results:  Labs:  Lab Results   Component Value Date    WBC 27.6 (H) 02/22/2024    HGB 11.7 (L) 02/22/2024    HCT 35.7 (L) 02/22/2024    MCV 89 02/22/2024     02/22/2024      Lab Results   Component Value Date    NEUTROABS 5.38 02/12/2024        Lab Results   Component Value Date    GLUCOSE 171 (H) 02/22/2024    CALCIUM 8.9 02/22/2024     02/22/2024    K 4.3 02/22/2024    CO2 27 02/22/2024     02/22/2024    BUN 16 02/22/2024    CREATININE 0.94 02/22/2024    MG 1.79 02/22/2024     Lab Results   Component Value Date    ALT 12 02/22/2024    AST 16 02/22/2024    ALKPHOS 65 02/22/2024    BILITOT 0.4 02/22/2024      Lab Results   Component Value Date    ACTH 40.3 02/22/2024    CORTISOL 18.2 02/22/2024    TSH 0.94 02/22/2024    FREET4 1.26 09/18/2023       STUDY:  CT CHEST ABDOMEN PELVIS W IV CONTRAST;  2/26/2024 9:17 am      INDICATION:  Signs/Symptoms:Surveillance stage IV NSCLC.      COMPARISON:  CT chest, abdomen and pelvis 11/24/2023, PET-CT 03/25/2022.      ACCESSION NUMBER(S):  [id number]      ORDERING CLINICIAN:  SALO THOMPSON      TECHNIQUE:  CT of the chest, abdomen, and pelvis was performed  following  intravenous administration of 75 ml of contrast Omnipaque 350.      FINDINGS:  CHEST:      Lines/Devices: None      Lungs: The trachea and central airways are patent without  endobronchial lesions. Stable consolidative opacities in the anterior  right lung apex in keeping with post radiation change. Scattered  ground-glass and part solid opacities, for example: * Stable right  apical part solid ground-glass opacity, 2.6 x 2.2 cm (series 202,  image 80) * Mildly decreased size left upper lobe ground-glass  opacity, 2.2 x 1.2 cm (series 202, image 153), previously 2.3 x 1.5  cm * Mildly decreased size left lower lobe ground-glass opacity with  central lucency, 3.2 x 1.9 cm (series 202, image 179), previously 3.3  x 2.2 cm      Vasculature: Minimal thoracic aortic atherosclerotic calcifications  without aneurysmal dilation. The main pulmonary artery is normal in  size. No central pulmonary embolus. Minimal coronary artery  calcifications.      Heart: Heart size is normal. No pericardial effusion.      Mediastinum and whit: No pathologically enlarged thoracic  lymphadenopathy. The esophagus is nondilated.      Chest wall and lower neck: No acute chest wall soft tissue  abnormalities. The visualized thyroid is normal.      ABDOMEN/PELVIS:      Liver: No mass.      Biliary: No intrahepatic or extrahepatic bile duct dilation.  Cholelithiasis without acute gallbladder inflammation.      Spleen: No mass. No splenomegaly.      Pancreas: Unchanged small cystic lesions, 0.8 cm in the uncinate  (series 201, image 120) and 0.4 cm in the pancreatic head (series  201, image 108).      Adrenals: Stable appearance of left adrenal gland measuring 3.2 x 1.3  cm. Stable right adrenal thickening.      Kidneys: No suspicious mass, calculus or hydronephrosis.      GI tract: Sigmoid diverticulosis with sigmoid wall thickening and  moderate adjacent inflammation. There is adjacent air locules which  are favored to remain  intraluminal. No bowel wall thickening or  dilation in the remaining small bowel or colon. Normal appendix.      Lymph nodes: No abdominopelvic lymphadenopathy.      Mesentery/peritoneum: No ascites, free air or fluid collection.      Vasculature: Mild abdominal aortic atherosclerotic calcifications  without aneurysmal dilation.      Pelvis: Trace free fluid. No free air or fluid collection. Grossly  stable calcified right uterine fundal pedunculated calcified mass.      Bones/Soft tissues: No lytic or sclerotic lesions. Unchanged grade 1  anterolisthesis of L5 over S1 with bilateral pars defects. Stable  right ischial benign bone island. No acute abdominal wall soft tissue  abnormalities.      IMPRESSION:  CHEST:  1. Stable right upper lobe post radiation changes.  2. Bilateral ground-glass/part solid opacities are stable to mildly  decreased in size.  3. No new or enlarging nodules or intrathoracic lymphadenopathy.      ABDOMEN-PELVIS:  1. Acute/subacute sigmoid diverticulitis without abscess or definite  bowel perforation.  2. Stable appearance of left adrenal.  3. No abdominopelvic lymphadenopathy.      Assessment/Plan   Stage CLIFTON NSCLC - metastasis to the left adrenal gland  Remains off immunotherapy since August 2023 with no signs or concerns for progression of her disease.  I personally reviewed her scans from 02/26/2024 and they demonstrate no concerning findings  Will be repeating them end of May 2024, with a follow-up subsequent to that  Knows to call and reach out if any new symptoms or concerns related to possible progression of her cancer develop, such as unintentional weight loss, fatigue, new onset respiratory symptoms, localized pain.    She is being worked up for CLL by Dr. Sravani Huang.     History of recurrent migraines   - Avoid NSAIDs for her migraines - explained need for adequate kidney function for pemetrexed infusion.      Anxiety   - meditating, doing Yoga, Des and saleem Chi at the  [facility]  - uses Xanax as needed.   Requested a prescription to be sent to [facility] today       This note was created with the assistance of a speech recognition program.  Although the intention is to generate a document that actually reflects the content of the visit, it is possible that some mistakes occur and may not be corrected by the time of completion of this note.        Cathy Alva MD, MS  Thoracic Medical Oncology   [address] [place], [place] [address]  Phone: [phone]

## 2024-03-05 ENCOUNTER — OFFICE VISIT (OUTPATIENT)
Dept: HEMATOLOGY/ONCOLOGY | Facility: CLINIC | Age: 79
End: 2024-03-05
Payer: MEDICARE

## 2024-03-05 VITALS
WEIGHT: 123.46 LBS | HEART RATE: 88 BPM | BODY MASS INDEX: 21.19 KG/M2 | SYSTOLIC BLOOD PRESSURE: 108 MMHG | RESPIRATION RATE: 18 BRPM | OXYGEN SATURATION: 94 % | TEMPERATURE: 99 F | DIASTOLIC BLOOD PRESSURE: 66 MMHG

## 2024-03-05 DIAGNOSIS — C85.90 NON-HODGKIN'S LYMPHOMA, UNSPECIFIED BODY REGION, UNSPECIFIED NON-HODGKIN LYMPHOMA TYPE (MULTI): ICD-10-CM

## 2024-03-05 PROCEDURE — 99214 OFFICE O/P EST MOD 30 MIN: CPT | Performed by: INTERNAL MEDICINE

## 2024-03-05 PROCEDURE — 3078F DIAST BP <80 MM HG: CPT | Performed by: INTERNAL MEDICINE

## 2024-03-05 PROCEDURE — 1159F MED LIST DOCD IN RCRD: CPT | Performed by: INTERNAL MEDICINE

## 2024-03-05 PROCEDURE — 3074F SYST BP LT 130 MM HG: CPT | Performed by: INTERNAL MEDICINE

## 2024-03-05 PROCEDURE — 1126F AMNT PAIN NOTED NONE PRSNT: CPT | Performed by: INTERNAL MEDICINE

## 2024-03-05 ASSESSMENT — PAIN SCALES - GENERAL: PAINLEVEL: 0-NO PAIN

## 2024-03-05 NOTE — PATIENT INSTRUCTIONS
Blood work is consistent with an indolent or slow growing B cell leukemia or lymphoma which is either chronic lymphocytic leukemia or marginal zone lymphoma as it doesn't fit into any neat category. At this point we plan watchful waiting.    We will followup in 3 months

## 2024-03-05 NOTE — PROGRESS NOTES
Patient ID: Herlinda Springer is a 78 y.o. female.    Diagnosis:   Problem List Items Addressed This Visit    None       Treatment:   Oncology History    No history exists.       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    Benign neoplasm of pituitary gland (CMS/[facility]) 02/15/2018    Microprolactinoma    Encounter for other preprocedural examination     Preoperative clearance    Episodic paroxysmal hemicrania, 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    Paroxysmal hemicrania    Essential (primary) hypertension 02/15/2018    White coat syndrome with hypertension    Laceration without foreign body of scalp, initial encounter 01/15/2014    Laceration of scalp    Lobar pneumonia, unspecified organism (CMS/[facility]) 09/18/2019    Lobar pneumonia    Melanocytic nevi, unspecified 02/15/2018    Benign mole    Migraine with aura, not intractable, without status migrainosus 02/15/2018    Classic migraine with aura    Other conditions influencing health status 02/15/2018    History of cough    Other conditions influencing health status 11/21/2014    History of cough    Other conditions influencing health status 10/12/2015    No significant past surgical history    Other specified disorders of bone density and structure, unspecified site 02/15/2018    Osteopenia    Pain in left ankle and joints of left foot     Chronic pain of left ankle    Pain in left ankle and joints of left foot 05/11/2017    Acute left ankle pain    Pain in left knee     Acute pain of left knee    Pain in left knee 05/11/2017    Acute pain of left knee    Pain in left knee 02/15/2018     Acute pain of left knee    Personal history of other diseases of the musculoskeletal system and connective tissue 06/18/2015    History of muscle pain    Personal history of other diseases of the respiratory system 11/17/2014    History of acute sinusitis    Personal history of other diseases of the respiratory system 06/06/2018    History of acute bronchitis    Personal history of other diseases of the respiratory system 02/15/2018    History of acute sinusitis    Personal history of other endocrine, nutritional and metabolic disease 06/22/2016    History of vitamin D deficiency    Personal history of other specified conditions 09/16/2019    History of chronic cough    Personal history of other specified conditions 02/15/2018    History of shortness of breath    Personal history of other specified conditions 01/13/2016    History of shortness of breath    Preglaucoma, unspecified, bilateral 10/02/2015    Glaucoma suspect, both eyes    Preglaucoma, unspecified, unspecified eye     Glaucoma suspect    Primary open-angle glaucoma, left eye, severe stage 10/26/2016    Primary open angle glaucoma of left eye, severe stage    Primary open-angle glaucoma, right eye, moderate stage 04/21/2022    Primary open angle glaucoma of right eye, moderate stage    Rupture of popliteal cyst 06/18/2015    Ruptured Bakers cyst    Rupture of popliteal cyst 02/15/2018    Ruptured Bakers cyst    Unspecified asthma with (acute) exacerbation 09/16/2019    Acute asthma exacerbation    Unspecified asthma with (acute) exacerbation 06/06/2018    Acute asthma exacerbation    Unspecified blepharitis left eye, unspecified eyelid 10/12/2015    Blepharitis of left eye    Unspecified glaucoma 02/15/2018    Glaucoma       Surgical History:     Past Surgical History:   Procedure Laterality Date    OTHER SURGICAL HISTORY  10/12/2015    Anal Fissurectomy    TONSILLECTOMY  08/01/2014    Tonsillectomy        Family History:   No cancers in family, one  sister age 90, sister  in  after falling.   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 no children. Light Former smoker as a younger adult, born in [place] emig[place] in .   Social History     Tobacco Use    Smoking status: Former     Types: Cigarettes   Vaping Use    Vaping Use: Never used   Substance Use Topics    Alcohol use: Never    Drug use: Never      -------------------------------------------------------------------------------------------------------  Subjective       HPI    Ms Springer is a 77 yo female h/o stage IV NSCLC (RUL mass, LT adrenal mets, mediastinal and cervical LN) s/p carbo/pem/pem (carbo given in , pembro finished 2023) and RT now with stable disease (on surveillance), lymphocytosis with flow showing a CD5 positive, CD23 positive lymphoproliferative disorder,  strong expression of CD20 and Ua678t and surface light chain atypical for CLL and  MZL vs LPL was favored, who was referred by thoracic oncologist Dr Alva for workup of lymphocytosis. Patient has had very mild leukocytosis 12K since 3/2023, however increase to 22.3 in November prompted further evaluation. She has preserved hgb and platelets.     Patient is here with her  today 3/4/24 for followup consultation regarding newly diagnosed b cell lymphoproliferative disorder. CBC shows wbc of 27.6 down from 35.9 early February, hgb 11.7 and platlets 199,000. She feels perfectly well.     Additional database:     SPEP faint IgM kappa and free lambd lyte chsins too low to quantitate, B2 2.9,  ( nl)  IgH heavy chain, not mutated  NGS results:   DISEASE ASSOCIATED GENOMIC FINDINGS:   AUSTIN p.* ([id number] c.3574A>T)  TP53 p.N239D (NM_000546 c.715A>G)  TP53 p.B033Bxj*21 ([id number]  c.[id number]_306delinsA)   CT scans to followup on her lung cancer 2/26/24 shows no splenomegally and no lymphadenopathy        Review of Systems   All other systems reviewed and are negative.     -------------------------------------------------------------------------------------------------------  Objective   BSA: There is no height or weight on file to calculate BSA.  There were no vitals taken for this visit.    Physical Exam  Constitutional:       Appearance: Normal appearance.      Comments: Fit and well looks a decade younger than stated age   HENT:      Head: Normocephalic and atraumatic.      Nose: Nose normal.   Eyes:      Extraocular Movements: Extraocular movements intact.      Conjunctiva/sclera: Conjunctivae normal.      Pupils: Pupils are equal, round, and reactive to light.   Cardiovascular:      Rate and Rhythm: Normal rate and regular rhythm.   Pulmonary:      Breath sounds: Normal breath sounds.   Chest:   Breasts:     Right: Normal.      Left: Normal.   Abdominal:      General: Abdomen is flat. Bowel sounds are normal.      Palpations: Abdomen is soft.   Musculoskeletal:         General: Normal range of motion.   Skin:     General: Skin is warm and dry.   Neurological:      General: No focal deficit present.      Mental Status: She is oriented to person, place, and time.   Psychiatric:         Mood and Affect: Mood normal.         Behavior: Behavior normal.         Thought Content: Thought content normal.         Performance Status:  Asymptomatic  -------------------------------------------------------------------------------------------------------  Assessment/Plan      Ms Springer is a 77 yo female h/o stage CLIFTON NSCLC (RUL mass, LT adrenal mets, mediastinal and cervical LN) s/p carbo/pem/pem (carbo given in 2021, pembro finished 8/2023) and RT (lung mass and adrenal mass) now with stable disease (on surveillance), new onset CD5, CD23 lymphocytosis with flow c/f MZL vs LPL, HTN, hypothyroidism, asthma,  who was referred by thoracic oncologist Dr Alva for workup of lymphocytosis.     1 Atypical CLL    Lymphocytosis first noticed in 3/2023 at 12k, but increased to 22.3 in November 2023.  She has preserved hgb and platelets  and is assymptomatic. ALC doubled in 2023. New borderline splenomegaly on CT in 10/2023.  PB flow showed CD5+ clonal ppl, atypical for CLL, mainly concern for MZL vs LPL.  Additional database notable for TP 53 mutations, absence of MYD88 goes against LPL, so I suspect this is likely an atypical CLL.      I explained to the patient and her  that these are generally indolent conditions, more common in older patient  and that need for treatment is generally based on trajectory of blood counts and development of cytopenias and symptomatic disease.  Accorder to IPSS score for assymptomatic CLL ( Condolucci Blood 2020 135(21):1859, she has two adverse markers lymhocytosis > 15 and unmutated IgG heavy variable chain) so chance of requeireing requireing therapy in next 5 years 61%.  We discussed that if treatment is needed would mayra utilize b cell directed treatment such as venetoclax and btk inhibitors with or without CD 20 antibodies.  For now recommended watchful waiting.      RTC: 3 month for provider visit and labs.         -------------------------------------------------------------------------------------------------------

## 2024-03-06 PROBLEM — C85.90 NHL (NON-HODGKIN'S LYMPHOMA) (MULTI): Status: RESOLVED | Noted: 2024-02-12 | Resolved: 2024-03-06

## 2024-03-06 PROBLEM — C91.10 CLL (CHRONIC LYMPHOCYTIC LEUKEMIA) (MULTI): Status: ACTIVE | Noted: 2024-03-06

## 2024-03-19 ENCOUNTER — TELEPHONE (OUTPATIENT)
Dept: PRIMARY CARE | Facility: CLINIC | Age: 79
End: 2024-03-19

## 2024-03-22 DIAGNOSIS — E03.9 HYPOTHYROIDISM, UNSPECIFIED TYPE: ICD-10-CM

## 2024-03-22 RX ORDER — LEVOTHYROXINE SODIUM 75 UG/1
75 TABLET ORAL DAILY
Qty: 90 TABLET | Refills: 1 | Status: SHIPPED | OUTPATIENT
Start: 2024-03-22

## 2024-03-28 DIAGNOSIS — J31.0 CHRONIC RHINITIS: ICD-10-CM

## 2024-03-28 RX ORDER — MONTELUKAST SODIUM 10 MG/1
10 TABLET ORAL EVERY EVENING
Qty: 90 TABLET | Refills: 1 | Status: SHIPPED | OUTPATIENT
Start: 2024-03-28

## 2024-04-18 ENCOUNTER — APPOINTMENT (OUTPATIENT)
Dept: OPHTHALMOLOGY | Facility: CLINIC | Age: 79
End: 2024-04-18
Payer: MEDICARE

## 2024-04-24 DIAGNOSIS — E78.5 HYPERLIPIDEMIA, UNSPECIFIED HYPERLIPIDEMIA TYPE: ICD-10-CM

## 2024-04-24 RX ORDER — ROSUVASTATIN CALCIUM 10 MG/1
10 TABLET, COATED ORAL DAILY
Qty: 90 TABLET | Refills: 1 | Status: SHIPPED | OUTPATIENT
Start: 2024-04-24

## 2024-04-25 ENCOUNTER — OFFICE VISIT (OUTPATIENT)
Dept: OPHTHALMOLOGY | Facility: CLINIC | Age: 79
End: 2024-04-25
Payer: MEDICARE

## 2024-04-25 DIAGNOSIS — H40.1131 PRIMARY OPEN-ANGLE GLAUCOMA, BILATERAL, MILD STAGE: Primary | ICD-10-CM

## 2024-04-25 DIAGNOSIS — Z96.1 PSEUDOPHAKIA OF BOTH EYES: ICD-10-CM

## 2024-04-25 PROCEDURE — 99213 OFFICE O/P EST LOW 20 MIN: CPT | Performed by: OPHTHALMOLOGY

## 2024-04-25 ASSESSMENT — VISUAL ACUITY
OS_SC: 20/20
OD_SC: 20/25
METHOD: SNELLEN - LINEAR
OD_SC+: +2

## 2024-04-25 ASSESSMENT — CONF VISUAL FIELD
OS_INFERIOR_TEMPORAL_RESTRICTION: 0
METHOD: COUNTING FINGERS
OS_SUPERIOR_TEMPORAL_RESTRICTION: 0
OD_INFERIOR_NASAL_RESTRICTION: 0
OS_NORMAL: 1
OS_SUPERIOR_NASAL_RESTRICTION: 0
OS_INFERIOR_NASAL_RESTRICTION: 0
OD_SUPERIOR_TEMPORAL_RESTRICTION: 0
OD_SUPERIOR_NASAL_RESTRICTION: 0
OD_INFERIOR_TEMPORAL_RESTRICTION: 0
OD_NORMAL: 1

## 2024-04-25 ASSESSMENT — EXTERNAL EXAM - LEFT EYE: OS_EXAM: NORMAL

## 2024-04-25 ASSESSMENT — PACHYMETRY
OD_CT(UM): 572
OS_CT(UM): 567

## 2024-04-25 ASSESSMENT — EXTERNAL EXAM - RIGHT EYE: OD_EXAM: NORMAL

## 2024-04-25 ASSESSMENT — SLIT LAMP EXAM - LIDS
COMMENTS: GOOD POSITION
COMMENTS: GOOD POSITION

## 2024-04-25 ASSESSMENT — TONOMETRY
IOP_METHOD: GOLDMANN APPLANATION
OS_IOP_MMHG: 11
OD_IOP_MMHG: 11

## 2024-04-25 NOTE — PROGRESS NOTES
Visual Acuity (Snellen - Linear)         Right Left    Dist sc 20/25 +2 20/20          Tonometry       Tonometry (Goldmann Applanation, 8:49 AM)         Right Left    Pressure 11 11                  Assessment/Plan   Last dilated:  4/6/23  Pt diagnosed with Stage 4 lung CA since Nov 2019 - doing well (just on Ketruda now)    1.  Early Primary Open-Angle Glaucoma OU:  /570.  IOP has some fluctuation, but there has been years of stable VFs and no evidence of progression.  Pt more comfortable OD off the zioptan.  s/p CE+IOL+Hydrus OD 6/7/22 (preop VA 20/60, IOP 12 mmHg).    s/p CE+IOL+Hydrus OS 7/20/22:  pre-op VA 20/30 glare to 20/50 and IOP 13 mmhg.      IOPs are well controlled without medication (previously tolerated zioptan)      Plan:  cont no Rx                f/u 4 months (HVF, dilation, RNFL)    2.  Pseudophakia (PCIOL) OU:  very early PCO forming, but would not advise YAG cap as she is minimally symptomatic      Plan:  as above    3.  h/o narrow angles OU:  s/p LPI OS 4/17/21 and OD 6/25/21.  patent LPIs with deepening of angles      Plan:  monitor

## 2024-05-01 ENCOUNTER — TELEPHONE (OUTPATIENT)
Dept: ADMISSION | Facility: HOSPITAL | Age: 79
End: 2024-05-01
Payer: MEDICARE

## 2024-05-01 DIAGNOSIS — F41.9 ANXIETY: ICD-10-CM

## 2024-05-01 RX ORDER — ALPRAZOLAM 0.5 MG/1
0.5 TABLET ORAL 3 TIMES DAILY PRN
Qty: 90 TABLET | Refills: 0 | Status: SHIPPED | OUTPATIENT
Start: 2024-05-01 | End: 2024-06-07 | Stop reason: SDUPTHER

## 2024-05-01 NOTE — TELEPHONE ENCOUNTER
Herlinda Springer called the refill line for Alprazolam 0.5mg. Requesting refills be sent to [facility] pharmacy; message sent to team to submit.

## 2024-05-14 ENCOUNTER — APPOINTMENT (OUTPATIENT)
Dept: RADIATION ONCOLOGY | Facility: HOSPITAL | Age: 79
End: 2024-05-14
Payer: MEDICARE

## 2024-05-17 ENCOUNTER — LAB (OUTPATIENT)
Dept: LAB | Facility: LAB | Age: 79
End: 2024-05-17
Payer: MEDICARE

## 2024-05-17 ENCOUNTER — TELEPHONE (OUTPATIENT)
Dept: HEMATOLOGY/ONCOLOGY | Facility: CLINIC | Age: 79
End: 2024-05-17

## 2024-05-17 DIAGNOSIS — C34.92 PRIMARY MALIGNANT NEOPLASM OF LEFT LUNG METASTATIC TO OTHER SITE (MULTI): Primary | ICD-10-CM

## 2024-05-17 DIAGNOSIS — C34.11 PRIMARY MALIGNANT NEOPLASM OF RIGHT UPPER LOBE OF LUNG (MULTI): ICD-10-CM

## 2024-05-17 DIAGNOSIS — C34.11 PRIMARY MALIGNANT NEOPLASM OF RIGHT UPPER LOBE OF LUNG (MULTI): Primary | ICD-10-CM

## 2024-05-17 DIAGNOSIS — R53.82 CHRONIC FATIGUE: ICD-10-CM

## 2024-05-22 ENCOUNTER — LAB (OUTPATIENT)
Dept: LAB | Facility: LAB | Age: 79
End: 2024-05-22
Payer: MEDICARE

## 2024-05-22 DIAGNOSIS — R53.82 CHRONIC FATIGUE: ICD-10-CM

## 2024-05-22 DIAGNOSIS — C34.92 PRIMARY MALIGNANT NEOPLASM OF LEFT LUNG METASTATIC TO OTHER SITE (MULTI): ICD-10-CM

## 2024-05-22 LAB
ALBUMIN SERPL BCP-MCNC: 3.9 G/DL (ref 3.4–5)
ALP SERPL-CCNC: 70 U/L (ref 33–136)
ALT SERPL W P-5'-P-CCNC: 10 U/L (ref 7–45)
ANION GAP SERPL CALC-SCNC: 12 MMOL/L (ref 10–20)
AST SERPL W P-5'-P-CCNC: 18 U/L (ref 9–39)
BILIRUB SERPL-MCNC: 0.4 MG/DL (ref 0–1.2)
BUN SERPL-MCNC: 19 MG/DL (ref 6–23)
CALCIUM SERPL-MCNC: 8.5 MG/DL (ref 8.6–10.6)
CHLORIDE SERPL-SCNC: 107 MMOL/L (ref 98–107)
CO2 SERPL-SCNC: 24 MMOL/L (ref 21–32)
CORTIS SERPL-MCNC: 15 UG/DL (ref 2.5–20)
CREAT SERPL-MCNC: 0.99 MG/DL (ref 0.5–1.05)
EGFRCR SERPLBLD CKD-EPI 2021: 58 ML/MIN/1.73M*2
GLUCOSE SERPL-MCNC: 88 MG/DL (ref 74–99)
MAGNESIUM SERPL-MCNC: 1.92 MG/DL (ref 1.6–2.4)
POTASSIUM SERPL-SCNC: 4.2 MMOL/L (ref 3.5–5.3)
PROT SERPL-MCNC: 6.4 G/DL (ref 6.4–8.2)
SODIUM SERPL-SCNC: 139 MMOL/L (ref 136–145)
TSH SERPL-ACNC: 0.81 MIU/L (ref 0.44–3.98)

## 2024-05-22 PROCEDURE — 36415 COLL VENOUS BLD VENIPUNCTURE: CPT

## 2024-05-22 PROCEDURE — 85027 COMPLETE CBC AUTOMATED: CPT

## 2024-05-22 PROCEDURE — 83735 ASSAY OF MAGNESIUM: CPT

## 2024-05-22 PROCEDURE — 82024 ASSAY OF ACTH: CPT

## 2024-05-22 PROCEDURE — 82533 TOTAL CORTISOL: CPT

## 2024-05-22 PROCEDURE — 80053 COMPREHEN METABOLIC PANEL: CPT

## 2024-05-22 PROCEDURE — 84443 ASSAY THYROID STIM HORMONE: CPT

## 2024-05-22 PROCEDURE — 85007 BL SMEAR W/DIFF WBC COUNT: CPT

## 2024-05-23 LAB
BASOPHILS # BLD MANUAL: 0 X10*3/UL (ref 0–0.1)
BASOPHILS NFR BLD MANUAL: 0 %
BURR CELLS BLD QL SMEAR: ABNORMAL
EOSINOPHIL # BLD MANUAL: 0 X10*3/UL (ref 0–0.4)
EOSINOPHIL NFR BLD MANUAL: 0 %
ERYTHROCYTE [DISTWIDTH] IN BLOOD BY AUTOMATED COUNT: 13.8 % (ref 11.5–14.5)
HCT VFR BLD AUTO: 37.4 % (ref 36–46)
HGB BLD-MCNC: 11.5 G/DL (ref 12–16)
IMM GRANULOCYTES # BLD AUTO: 0.14 X10*3/UL (ref 0–0.5)
IMM GRANULOCYTES NFR BLD AUTO: 0.3 % (ref 0–0.9)
LYMPHOCYTES # BLD MANUAL: 39.4 X10*3/UL (ref 0.8–3)
LYMPHOCYTES NFR BLD MANUAL: 81.4 %
MCH RBC QN AUTO: 28.4 PG (ref 26–34)
MCHC RBC AUTO-ENTMCNC: 30.7 G/DL (ref 32–36)
MCV RBC AUTO: 92 FL (ref 80–100)
MONOCYTES # BLD MANUAL: 3.53 X10*3/UL (ref 0.05–0.8)
MONOCYTES NFR BLD MANUAL: 7.3 %
NEUTS SEG # BLD MANUAL: 3.53 X10*3/UL (ref 1.6–5)
NEUTS SEG NFR BLD MANUAL: 7.3 %
NRBC BLD-RTO: 0 /100 WBCS (ref 0–0)
PLATELET # BLD AUTO: 193 X10*3/UL (ref 150–450)
RBC # BLD AUTO: 4.05 X10*6/UL (ref 4–5.2)
RBC MORPH BLD: ABNORMAL
TOTAL CELLS COUNTED BLD: 124
VARIANT LYMPHS # BLD MANUAL: 1.94 X10*3/UL (ref 0–0.3)
VARIANT LYMPHS NFR BLD: 4 %
WBC # BLD AUTO: 48.4 X10*3/UL (ref 4.4–11.3)

## 2024-05-24 ENCOUNTER — HOSPITAL ENCOUNTER (OUTPATIENT)
Dept: RADIOLOGY | Facility: HOSPITAL | Age: 79
Discharge: HOME | End: 2024-05-24
Payer: MEDICARE

## 2024-05-24 ENCOUNTER — TELEPHONE (OUTPATIENT)
Dept: RADIATION ONCOLOGY | Facility: HOSPITAL | Age: 79
End: 2024-05-24
Payer: MEDICARE

## 2024-05-24 DIAGNOSIS — C34.11 PRIMARY MALIGNANT NEOPLASM OF RIGHT UPPER LOBE OF LUNG (MULTI): ICD-10-CM

## 2024-05-24 LAB — ACTH PLAS-MCNC: 36 PG/ML (ref 7.2–63.3)

## 2024-05-24 PROCEDURE — 74177 CT ABD & PELVIS W/CONTRAST: CPT

## 2024-05-24 PROCEDURE — 74177 CT ABD & PELVIS W/CONTRAST: CPT | Performed by: RADIOLOGY

## 2024-05-24 PROCEDURE — 2550000001 HC RX 255 CONTRASTS: Performed by: INTERNAL MEDICINE

## 2024-05-24 PROCEDURE — 71260 CT THORAX DX C+: CPT | Performed by: RADIOLOGY

## 2024-05-24 RX ADMIN — IOHEXOL 69 ML: 350 INJECTION, SOLUTION INTRAVENOUS at 11:21

## 2024-05-24 NOTE — TELEPHONE ENCOUNTER
Called pt to remind of appointment on 5/28/2024 at 4:00 Pt answered and will be present.    Pt is aware that the appointment is a phone/virtual visit, pt. understands that he/she doesn't have to show up in office.

## 2024-05-28 ENCOUNTER — HOSPITAL ENCOUNTER (OUTPATIENT)
Dept: RADIATION ONCOLOGY | Facility: HOSPITAL | Age: 79
Setting detail: RADIATION/ONCOLOGY SERIES
Discharge: HOME | End: 2024-05-28
Payer: MEDICARE

## 2024-05-28 DIAGNOSIS — C34.91 PRIMARY MALIGNANT NEOPLASM OF RIGHT LUNG METASTATIC TO OTHER SITE (MULTI): Primary | ICD-10-CM

## 2024-05-28 DIAGNOSIS — E27.8 ADRENAL MASS (MULTI): ICD-10-CM

## 2024-05-28 PROCEDURE — 99214 OFFICE O/P EST MOD 30 MIN: CPT | Performed by: NURSE PRACTITIONER

## 2024-05-28 ASSESSMENT — ENCOUNTER SYMPTOMS
SHORTNESS OF BREATH: 0
HEMATOLOGIC/LYMPHATIC NEGATIVE: 1
FATIGUE: 0
DIAPHORESIS: 0
MUSCULOSKELETAL NEGATIVE: 1
CHILLS: 0
FEVER: 0
APPETITE CHANGE: 0
WHEEZING: 0
CARDIOVASCULAR NEGATIVE: 1
RESPIRATORY NEGATIVE: 1
UNEXPECTED WEIGHT CHANGE: 0
PSYCHIATRIC NEGATIVE: 1
NEUROLOGICAL NEGATIVE: 1
ACTIVITY CHANGE: 0
COUGH: 0

## 2024-05-28 NOTE — PROGRESS NOTES
Patient ID: [id number]   Cancer Staging:          Lung         AJCC Edition: 8th (AJCC), Diagnosis Date: 17-Dec-2019, [place], cT2a cN3 cM1b      Treatment Synopsis:    Mrs. Springer is a 77 yo female, former smoker (quit 25 years prior to dx)who presented with  new onset respiratory sx (cough), decreased appetite and weight loss (lost 13 lbs in 2 months).   Chest CT 11/07/19 showed a RUL nodule measuring 4 cm, mediastinal adenopathy (level 2L measuring 1.8 cm, .   A PET scan obtained on 12/03/19 showed FDG uptake in the RUL nodule as well as 1R/1L nodes, bilateral mediastinal and right hilar node. Left adrenal gland was hypermetabolic, c/w metastasis.  On 12/17/19 CT-guided biopsy of the left adrenal gland confirmed the presence of a mucinous adenocarcinoma, TTF-1 positive, Napsin A and CK7 also positive. PD-L1 TPS < 1%, with no actionable mutations, TP53 mutant.   MRI brain 12/22/19 showed a 2 mm enhancement in left temporal lobe that is nonspecific but could be an additional metastatic site.   01/15/20: cycle # 1 carbo/pemetrexed/pembrolizumab   03/18/20: Cycle # 4 carbo/pemetrexed/pembrolizumab   04/08/20: starts maintenance pemetrexed/pembrolizumab   05/26/20: MRI brain shows no intracranial disease  05/26/20: CT C/A/P: increase in size of RUL mass - now measuring ~ 3.2 cm and stability of left adrenal gland - referred to Rad Onc for consideration of SBRT - continues pemetrexed/pembrolizumab maintenance   08/19/20:  completes RT to the RUL  08/20/20: grade 1 rash b/l UEs - treated with topical triamcinolone - continue pemetrexed + pembrolizumab  10/16/20: CT C/A/P: Increased consolidative changes  in the RUL c/w RT-induced fibrosis - no LAD. The adrenal glands look stable - the multiple lung GG opacifications are stable   11/27/20: CT C/A/P - improvement on RUL interstitial changes - there is no sign of PD. The RUL mass has decreased in size from 7 to 5 cm in greatest diameter - left adrenal gland is stable in size.    02/19/21: CT C/A/P personally reviewed by me: the RUL apical lesion that has been irradiated is further  decreased in size, now measuring 3.9 cm x 2 cm and previously on 11/24/21 measuring 4.8 cm x 2.4 cm (solid component). The left adrenal mass measures  3.4 cm and is stable in size. There are no new concerning metastatic lesions,. Multiple areas of GGO remain and are of no clinical significance in this patients with metastatic disease.   05/14/21: CT Chest/abdomen/pelvis personally reviewed by me. The RUL mass remains stable and so do the nodes. However, the GGO opacifications persist - the left adrenal gland is slightly smaller (3.6 cm << 3.8 cm). The right adrenal gland remains stable.      6/30/21: Will continue maintenance pembrolizumab and pemetrexed. Given CrCl, will dose Pemetrexed at 375 mg/m2 today.      08/06/21: CT C/A/P shows further development of interstitial changes in the RUL that look purely inflammatory in nature - there are no concerns for PD - no worsening LAD, the left adrenal mass remains stable at 3.6 cm. This adrenal gland has been biopsied  at her dx      12/09/21: CT C/A/P shows SD - the RUL changes from prior RT remain stable - no new emerging LAD. Adrenal glands stable      12/15/21: dopplers LE negative for DVT      03/04/2022: CT Chest/Abdomen/Pelvis shows no signs of PD with the exception of left adrenal gland that continues to measure 3.4 cm however has developed an area of necrosis that is concerning. Both RUL and LLL GGO remain stable measuring 2.7 cm and 2.8  cm respectively      03/25/2022: PET scan demonstrates close to metabolic complete remission, left adrenal gland SUV has decreased from 6.4 to 2.2     4/4/22-4/13/22: Completed SBRT to left adrenal gland.     Last pembrolizumab dose on 08/02/2023 due to elevated WBC    History of presenting illness    Telehealth visit with Ms. Springer today. Patient is a 78 year old female who presents today for follow up 2 years and 1 months  s/p RT to the left adrenal gland. Pembro was discontinued due to elevated WBC count. There are no plans to resume.  There was some concern for CLL. She has follow up with Dr. Huang in June. They had a stressful few months at home during March and April where they had to leave their house for 11 days due to repairs. Finally feels like everything is back to normal. She says she feels healthy. She has been enjoying the outdoors now that the weather is getting better. Energy is baseline. Appetite good. Weight stable.  Breathing is baseline. Denies new cough, chest pain, back pain, fevers or worsening SOB. No neurological  complaints.  CT CAP scan done on  5/24/24. Follow up with Dr. Alva on 5/30/24.    Review of systems:  Review of Systems   Constitutional:  Negative for activity change, appetite change, chills, diaphoresis, fatigue, fever and unexpected weight change.   HENT: Negative.     Respiratory: Negative.  Negative for cough, shortness of breath and wheezing.    Cardiovascular: Negative.    Musculoskeletal: Negative.    Skin: Negative.    Neurological: Negative.    Hematological: Negative.    Psychiatric/Behavioral: Negative.         Past Medical history  She  has a past surgical history that includes Tonsillectomy (08/01/2014) and Other surgical history (10/12/2015).        Radiology results:  CT chest abdomen pelvis w IV contrast 05/24/2024    Narrative  Interpreted By:  Willis Horta  and Min Quijano  STUDY:  CT CHEST ABDOMEN PELVIS W IV CONTRAST;  5/24/2024 11:21 am    INDICATION:  Signs/Symptoms:surveillance stage IV NSCLC off chemotherapy and  imunotherapy - assess stability of disease.    Per EMR  78-year-old female with history of stage IV A NSCLC (right upper lobe  mass, left adrenal mass, mediastinal and cervical lymphadenopathy).  Status post carbo pem pem (finished 08/2023), radiation therapy (lung  mass 8/2020 and adrenal mass). Currently on surveillance with stable  disease. New onset CD5,  CD23 lymphocytosis with concern for MZL  versus LPL.    COMPARISON:  CT chest abdomen pelvis 02/26/2024    ACCESSION NUMBER(S):  [id number]    ORDERING CLINICIAN:  SALO THOMPSON    TECHNIQUE:  CT of the chest, abdomen, and pelvis was performed.  Contiguous axial  images were obtained at 3 mm slice thickness through the chest,  abdomen and pelvis. Coronal and sagittal reconstructions at 3 mm  slice thickness were performed.  69 ml of contrast Omnipaque 350 were administered intravenously  without immediate complication.    FINDINGS:  CHEST:    LUNG/PLEURA/LARGE AIRWAYS:  The trachea and central airways are patent. No endobronchial lesions  are evident. Similar right upper lobe consolidative opacity within  the right upper lobe that is consistent with postradiation changes.  Stable size of several ground-glass opacities in the bilateral lobes  of the lung. For example on series 3: Image 360: 2.4 x 2.2 cm right  upper lobe ground-glass opacity, previously 2.6 x 2.2 cm. Image 176:  1.9 x 1.4 cm left upper lobe ground-glass opacity, previously 2.2 x  1.2 cm. Image 190: 3.2 x 1.7 cm ground-glass opacity, previously 3.2  x 1.9 cm. Additional smaller ground-glass opacities are also similar  in size. There is also stable 0.5 cm right middle lobe nodule (series  3, image 112). No new pulmonary nodules.  No pleural effusion or pneumothorax.    VESSELS:  Aorta and pulmonary arteries are normal caliber.  No atherosclerotic  changes of the aorta are identified.  Minimal coronary artery  calcifications are present.    HEART:  The heart is normal in size.  No pericardial effusion    MEDIASTINUM AND MATTHEW:  No mediastinal, hilar or axillary lymphadenopathy is present by CT  size criteria. Esophagus: Within normal limits.    CHEST WALL AND LOWER NECK:  The soft tissues of the chest wall demonstrate no gross abnormality.  Stable 0.4 cm left calcified thyroid nodule (series 2, image 22).  Otherwise, the visualized thyroid gland appears  within normal limits.    ABDOMEN:    LIVER:  The liver is normal in size without evidence of focal liver lesions.    BILE DUCTS:  The intrahepatic and extrahepatic ducts are not dilated.    GALLBLADDER:  No calcified stones. No wall thickening.    PANCREAS:  Similar size 0.7 cm hypodensity within the uncinate process (series  2, image 124), previously 0.8 cm. Another 0.4 cm hypodensity within  the pancreatic head (series 2, image 114) is also stable compared to  prior exam. These lesions are likely compatible with IPMNs. No new  pancreatic lesions. No evidence of ductal dilation.    SPLEEN:  The spleen is enlarged in size measuring 14.8 cm in the craniocaudal  dimension. No focal lesions within the spleen.    ADRENAL GLANDS:  Bilateral adrenal glands are stably diffusely enlarged. The left  adrenal mass measures 3.3 X 1.1 Cm (series 2, image 106). The right  adrenal gland thickening measures 2.7 x 1.0 cm (series 2, image 98).    KIDNEYS AND URETERS:  The kidneys are normal in size and enhance symmetrically.  Redemonstrated bilateral peripelvic cysts. No hydroureteronephrosis  or nephroureterolithiasis is identified.    PELVIS:    BLADDER:  The urinary bladder appears normal without abnormal wall thickening.    REPRODUCTIVE ORGANS:  Similar-appearing 6.5 x 6.6 cm heterogeneous right adnexal mass with  calcifications (series 2, image 158, previously 6.6 x 6.2 cm).  Redemonstrated stable 4.6 x 3.2 cm hypodense lesion of the uterus  (series 2, image 181). Another stable more anterior hypodense lesion  within the uterus measuring 2.4 x 2.0 cm (series 2, image 175). The  right adnexal mass in the hypodense uterus lesions likely represent  uterine leiomyomas.    BOWEL:  The stomach is unremarkable.  The small and large bowel are normal in  caliber and demonstrate no wall thickening.  The appendix appears  normal. There is large bowel diverticulosis without evidence of  diverticulitis.      VESSELS:  There is no aneurysmal  dilatation of the abdominal aorta. The IVC  appears normal. There is mild calcified atherosclerosis of the  abdominal aorta.    PERITONEUM/RETROPERITONEUM/LYMPH NODES:  No ascites or free air, no fluid collection.  No abdominopelvic  lymphadenopathy is present by CT size criteria. There are several  subcentimeter retroperitoneal lymph nodes. The largest measures 0.5  cm and is within the aortocaval space (series 2, image 120).    BONE AND SOFT TISSUE:  No suspicious osseous lesions are identified. Degenerative discogenic  disease is noted in the lower thoracic and lumbar spine. Stable  anterolisthesis of L5 onto S1. Similar sized 1.5 cm sclerotic lesion  within the right ischium that likely represents a bone island (series  2, image 186).. The abdominal wall soft tissues appear normal.    Impression  Lung cancer restaging exam in comparison to CT chest abdomen pelvis  02/26/2024.    CHEST-ABDOMEN-PELVIS:  1. Overall stable disease including post-treatment changes and  bilateral ground-glass opacities. No evidence of new disease.  2. Stable left adrenal mass and right adrenal thickening.  3. Additional findings as described above.    I personally reviewed the images/study and I agree with the findings  as stated by Samm Copeland MD. This study was interpreted at  [facility], [place], [place].    MACRO:  None    Signed by: Willis Horta 5/24/2024 5:25 PM  Dictation workstation:   [id number]   Plan:  Assessment/Plan   78 year old female 2 years and 1 months s/p RT to the left adrenal gland for metastatic lung cancer. Patient is doing well. No acute complaints related to treatment. CT CAP from 5/24/24 shows overall stable disease including post-treatment changes and  bilateral ground-glass opacities. No evidence of new disease.  Stable left adrenal mass and right adrenal thickening. No longer on immunotherapy due to elevated WBC counts.  Continue imaging and follow up with Dr. Alva  as scheduled. Continue follow up with Dr. Huang as scheduled.  Will coordinate next follow up for after next CT scan ordered by Dr. Alva on Thursday.  Instructed  to call with any questions or concerns.       I performed this visit using real- time telehealth tools , including an audio/video or telephone connection between Herlinda Springer  , who is in their home and Tameka Candelaria CNP at [facility].

## 2024-05-29 NOTE — PROGRESS NOTES
Patient ID: Herlinda Springer is a 78 y.o. female    Primary Care Provider: Alley Bui MD    DIAGNOSIS AND STAGING  Stage CLIFTON ([id number]) NSCLC (adenocarcinoma, TTF-1+, Napsin A+) of the RUL   Dx through left adrenal gland bx on 12/17/19     SITES OF DISEASE  Right upper lobe  Left adrenal gland   Brain - left parietal lobe      MOLECULAR GENOMICS  TP53 mutant   EGFR negative  ALK-1 negative   ROS1 negative  BRAF V600E negative  NTRK negative      PD-L1 TPS < 1%     PRIOR THERAPIES  Cycle # 1 carboplatin/pemetrexed/pembrolizumab - 01/15/20, cycle # 4 on 03/18/20  Starts maintenance pemetrexed/pembrolizumab on 04/08/20  Pemetrexed discontinued on 03/01/2023 due to decline GFR'   Last pembrolizumab dose on 08/02/2023     CURRENT THERAPY  Observation     CURRENT ONCOLOGICAL PROBLEMS  MRI brain 12/22/19 - 2 mm enhancing lesion left parietal lobe, unclear if metastasis - repeat MRI 06/26/20 showed resolution of that finding   CT 06/26/20 shows oligo-progression of RUL - left adrenal metastasis stable - s/p RT to the RUL - completed 08/19/20     HISTORY OF PRESENT ILLNESS  This is a former smoker (quit 25 years prior to dx)who presented with new onset respiratory sx (cough), decreased appetite and weight loss  (lost 13 lbs in 2 months).   Chest CT 11/07/19 showed a RUL nodule measuring 4 cm, mediastinal adenopathy (level 2L measuring 1.8 cm, .   A PET scan obtained on 12/03/19 showed FDG uptake in the RUL nodule as well as 1R/1L nodes, bilateral mediastinal and right hilar node. Left adrenal gland was hypermetabolic, c/w metastasis.  On 12/17/19 CT-guided biopsy of the left adrenal gland confirmed the presence of a mucinous adenocarcinoma, TTF-1 positive, Napsin A and CK7 also positive. PD-L1 TPS < 1%, with no actionable mutations, TP53 mutant.   MRI brain 12/22/19 showed a 2 mm enhancement in left temporal lobe that is nonspecific but could be an additional metastatic site.   01/15/20: cycle # 1  carbo/pemetrexed/pembrolizumab   03/18/20: Cycle # 4 carbo/pemetrexed/pembrolizumab   04/08/20: starts maintenance pemetrexed/pembrolizumab   05/26/20: MRI brain shows no intracranial disease  05/26/20: CT C/A/P: increase in size of RUL mass - now measuring ~ 3.2 cm and stability of left adrenal gland - referred to Rad Onc for consideration of SBRT - continues pemetrexed/pembrolizumab maintenance   08/19/20:  completes RT to the RUL  08/20/20: grade 1 rash b/l UEs - treated with topical triamcinolone - continue pemetrexed + pembrolizumab  10/16/20: CT C/A/P: Increased consolidative changes  in the RUL c/w RT-induced fibrosis - no LAD. The adrenal glands look stable - the multiple lung GG opacifications are stable   11/27/20: CT C/A/P - improvement on RUL interstitial changes - there is no sign of PD. The RUL mass has decreased in size from 7 to 5 cm in greatest diameter - left adrenal gland is stable in size.   02/19/21: CT C/A/P personally reviewed by me: the RUL apical lesion that has been irradiated is further  decreased in size, now measuring 3.9 cm x 2 cm and previously on 11/24/21 measuring 4.8 cm x 2.4 cm (solid component). The left adrenal mass measures  3.4 cm and is stable in size. There are no new concerning metastatic lesions,. Multiple areas of GGO remain and are of no clinical significance in this patients with metastatic disease.   05/14/21: CT Chest/abdomen/pelvis personally reviewed by me. The RUL mass remains stable and so do the nodes. However, the GGO opacifications persist - the left adrenal gland is slightly smaller (3.6 cm << 3.8 cm). The right adrenal gland remains stable.      6/30/21: Will continue maintenance pembrolizumab and pemetrexed. Given CrCl, will dose Pemetrexed at 375 mg/m2 today.      08/06/21: CT C/A/P shows further development of interstitial changes in the RUL that look purely inflammatory in nature - there are no concerns for PD - no worsening LAD, the left adrenal mass  remains stable at 3.6 cm. This adrenal gland has been biopsied  at her dx      12/09/21: CT C/A/P shows SD - the RUL changes from prior RT remain stable - no new emerging LAD. Adrenal glands stable      12/15/21: dopplers LE negative for DVT      03/04/2022: CT Chest/Abdomen/Pelvis shows no signs of PD with the exception of left adrenal gland that continues to measure 3.4 cm however has developed an area of necrosis that is  concerning. Both RUL and LLL GGO remain stable measuring 2.7 cm and 2.8 cm respectively      03/25/2022: PET scan demonstrates close to metabolic complete remission, left adrenal gland SUV has decreased from 6.4 to 2.2     4/4/22-4/13/22: Completed SBRT to left adrenal gland.     06/17/2022: CT Chest/Abdomen/Pelvis demonstrates stable findings, stable size of left adrenal gland, stable GGO throughout bilateral lung fields      09/09/2022: CT CAP shows stable disease including stability of L adrenal gland; concern in liver noted on read reviewed and more consistent with fatty infiltration also seen on previous scans      10/07/2022: MRI brain demonstrates no intracranial metastasis.  Findings compatible with mastoiditis but subsequent CT scan ruled out mastoiditis, with a finding of osteopenia.     12/02/2022: CT CAP shows stable disease within the lung, L adrenal gland. Also noted stable pancreatic head & uncinate process lesions thought to represent IPMNs      02/20/2023: CT chest/abdomen/pelvis personally reviewed by me, demonstrating stable changes in the lungs as well as adrenal glands.  No new metastatic sites demonstrated.      03/01/2023: Discussion with patient regards to risks and benefits of continued single agent pembrolizumab in view of her issues with declining GFR associated with pemetrexed infusions.  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  Additional database notable for TP 53 mutations, absence of MYD88 goes against LPL, so I suspect this is likely an atypical CLL\"   Asthma  HTN  Hypothyroidism   HLD  Glaucoma   Migraines     SURGICAL HISTORY  Anal fissures in her 30's  [age]il[age]omy 4/4 yo     SOCIAL HISTORY  Smoked 0.5 ppd from ages [age]  Drinks red wine daily   Plays tennis 3 x/week - doubles  Retired  - worked at [employer] and   Speaks 4 languages (Polish, Azeri, [place] and English)        FAMILY HISTORY  Maternal aunt  of metastatic cancer to the liver   Has one sister at age 84 who has Parkinson's  One sister  at age 73 of cirrhosis     CURRENT MEDS REVIEWED       ALLERGIES REVIEWED        SUBJECTIVE:      Last visit:   Patient is doing well, undergoing workup for CLL with Dr. Sravani Huang  There has been no unintentional weight loss  Preserved stamina levels -   No new localized pain of concern, including unexplained headaches or neurological symptoms  No new headaches or new neuro sx     A 13 point review of systems was performed, with significant findings documented above in subjective history.    OBJECTIVE:  Vitals:    24 1054   BP: 130/63   Pulse: 94   Resp: 20   Temp: 36.2 °C (97.2 °F)   SpO2: 100%        Body surface area is 1.59 meters squared.     Wt Readings from Last 5 Encounters:   24 56.3 kg (124 lb 1.9 oz)   24 56 kg (123 lb 7.3 oz)   24 56.5 kg (124 lb 9 oz)   23 54.9 kg (121 lb)   23 55.5 kg (122 lb 5.7 oz)       ECOGSCORE: 0- Fully active, able to carry on all pre-disease performance w/o restriction.    Physical Exam  Constitutional:       Appearance: Normal appearance. She " is normal weight.   HENT:      Head: Normocephalic and atraumatic.   Eyes:      General: No scleral icterus.     Extraocular Movements: Extraocular movements intact.      Conjunctiva/sclera: Conjunctivae normal.   Cardiovascular:      Rate and Rhythm: Normal rate and regular rhythm.      Heart sounds: Normal heart sounds.   Pulmonary:      Effort: Pulmonary effort is normal.      Breath sounds: Normal breath sounds.   Abdominal:      General: Abdomen is flat.      Palpations: Abdomen is soft. There is no mass.      Tenderness: There is no abdominal tenderness. There is no guarding.   Musculoskeletal:      Cervical back: Neck supple.   Skin:     General: Skin is warm and dry.   Neurological:      General: No focal deficit present.      Mental Status: She is alert and oriented to person, place, and time. Mental status is at baseline.      Motor: No weakness.      Gait: Gait normal.   Psychiatric:         Mood and Affect: Mood normal.         Behavior: Behavior normal.         Thought Content: Thought content normal.         Judgment: Judgment normal.          Diagnostic Results   Results:  Labs:  Lab Results   Component Value Date    WBC 48.4 (H) 05/22/2024    HGB 11.5 (L) 05/22/2024    HCT 37.4 05/22/2024    MCV 92 05/22/2024     05/22/2024      Lab Results   Component Value Date    NEUTROABS 5.38 02/12/2024        Lab Results   Component Value Date    GLUCOSE 88 05/22/2024    CALCIUM 8.5 (L) 05/22/2024     05/22/2024    K 4.2 05/22/2024    CO2 24 05/22/2024     05/22/2024    BUN 19 05/22/2024    CREATININE 0.99 05/22/2024    MG 1.92 05/22/2024     Lab Results   Component Value Date    ALT 10 05/22/2024    AST 18 05/22/2024    ALKPHOS 70 05/22/2024    BILITOT 0.4 05/22/2024      Lab Results   Component Value Date    ACTH 36.0 05/22/2024    CORTISOL 15.0 05/22/2024    TSH 0.81 05/22/2024    FREET4 1.26 09/18/2023       STUDY:  CT CHEST ABDOMEN PELVIS W IV CONTRAST;  5/24/2024 11:21 am       INDICATION:  Signs/Symptoms:surveillance stage IV NSCLC off chemotherapy and  imunotherapy - assess stability of disease.      Per EMR  78-year-old female with history of stage IV A NSCLC (right upper lobe  mass, left adrenal mass, mediastinal and cervical lymphadenopathy).  Status post carbo pem pem (finished 08/2023), radiation therapy (lung  mass 8/2020 and adrenal mass). Currently on surveillance with stable  disease. New onset CD5, CD23 lymphocytosis with concern for MZL  versus LPL.      COMPARISON:  CT chest abdomen pelvis 02/26/2024      ACCESSION NUMBER(S):  [id number]      ORDERING CLINICIAN:  SALO THOMPSON      TECHNIQUE:  CT of the chest, abdomen, and pelvis was performed.  Contiguous axial  images were obtained at 3 mm slice thickness through the chest,  abdomen and pelvis. Coronal and sagittal reconstructions at 3 mm  slice thickness were performed.  69 ml of contrast Omnipaque 350 were administered intravenously  without immediate complication.      FINDINGS:  CHEST:      LUNG/PLEURA/LARGE AIRWAYS:  The trachea and central airways are patent. No endobronchial lesions  are evident. Similar right upper lobe consolidative opacity within  the right upper lobe that is consistent with postradiation changes.  Stable size of several ground-glass opacities in the bilateral lobes  of the lung. For example on series 3: Image 360: 2.4 x 2.2 cm right  upper lobe ground-glass opacity, previously 2.6 x 2.2 cm. Image 176:  1.9 x 1.4 cm left upper lobe ground-glass opacity, previously 2.2 x  1.2 cm. Image 190: 3.2 x 1.7 cm ground-glass opacity, previously 3.2  x 1.9 cm. Additional smaller ground-glass opacities are also similar  in size. There is also stable 0.5 cm right middle lobe nodule (series  3, image 112). No new pulmonary nodules.  No pleural effusion or pneumothorax.      VESSELS:  Aorta and pulmonary arteries are normal caliber.  No atherosclerotic  changes of the aorta are identified.  Minimal coronary  artery  calcifications are present.      HEART:  The heart is normal in size.  No pericardial effusion      MEDIASTINUM AND MATTHEW:  No mediastinal, hilar or axillary lymphadenopathy is present by CT  size criteria. Esophagus: Within normal limits.      CHEST WALL AND LOWER NECK:  The soft tissues of the chest wall demonstrate no gross abnormality.  Stable 0.4 cm left calcified thyroid nodule (series 2, image 22).  Otherwise, the visualized thyroid gland appears within normal limits.      ABDOMEN:      LIVER:  The liver is normal in size without evidence of focal liver lesions.      BILE DUCTS:  The intrahepatic and extrahepatic ducts are not dilated.      GALLBLADDER:  No calcified stones. No wall thickening.      PANCREAS:  Similar size 0.7 cm hypodensity within the uncinate process (series  2, image 124), previously 0.8 cm. Another 0.4 cm hypodensity within  the pancreatic head (series 2, image 114) is also stable compared to  prior exam. These lesions are likely compatible with IPMNs. No new  pancreatic lesions. No evidence of ductal dilation.      SPLEEN:  The spleen is enlarged in size measuring 14.8 cm in the craniocaudal  dimension. No focal lesions within the spleen.      ADRENAL GLANDS:  Bilateral adrenal glands are stably diffusely enlarged. The left  adrenal mass measures 3.3 X 1.1 Cm (series 2, image 106). The right  adrenal gland thickening measures 2.7 x 1.0 cm (series 2, image 98).      KIDNEYS AND URETERS:  The kidneys are normal in size and enhance symmetrically.  Redemonstrated bilateral peripelvic cysts. No hydroureteronephrosis  or nephroureterolithiasis is identified.      PELVIS:      BLADDER:  The urinary bladder appears normal without abnormal wall thickening.      REPRODUCTIVE ORGANS:  Similar-appearing 6.5 x 6.6 cm heterogeneous right adnexal mass with  calcifications (series 2, image 158, previously 6.6 x 6.2 cm).  Redemonstrated stable 4.6 x 3.2 cm hypodense lesion of the uterus  (series  2, image 181). Another stable more anterior hypodense lesion  within the uterus measuring 2.4 x 2.0 cm (series 2, image 175). The  right adnexal mass in the hypodense uterus lesions likely represent  uterine leiomyomas.      BOWEL:  The stomach is unremarkable.  The small and large bowel are normal in  caliber and demonstrate no wall thickening.  The appendix appears  normal. There is large bowel diverticulosis without evidence of  diverticulitis.          VESSELS:  There is no aneurysmal dilatation of the abdominal aorta. The IVC  appears normal. There is mild calcified atherosclerosis of the  abdominal aorta.      PERITONEUM/RETROPERITONEUM/LYMPH NODES:  No ascites or free air, no fluid collection.  No abdominopelvic  lymphadenopathy is present by CT size criteria. There are several  subcentimeter retroperitoneal lymph nodes. The largest measures 0.5  cm and is within the aortocaval space (series 2, image 120).      BONE AND SOFT TISSUE:  No suspicious osseous lesions are identified. Degenerative discogenic  disease is noted in the lower thoracic and lumbar spine. Stable  anterolisthesis of L5 onto S1. Similar sized 1.5 cm sclerotic lesion  within the right ischium that likely represents a bone island (series  2, image 186).. The abdominal wall soft tissues appear normal.      IMPRESSION:  Lung cancer restaging exam in comparison to CT chest abdomen pelvis  02/26/2024.      CHEST-ABDOMEN-PELVIS:  1. Overall stable disease including post-treatment changes and  bilateral ground-glass opacities. No evidence of new disease.  2. Stable left adrenal mass and right adrenal thickening.  3. Additional findings as described above.          Assessment/Plan   Stage CLIFTON NSCLC - metastasis to the left adrenal gland  Remains off immunotherapy since August 2023 with no signs or concerns for progression of her disease.  Doing very well  I have reviewed her scans now from May   There is no sign of PD  Repeat in Nov 2024 with labs    Knows to call and reach out if any new symptoms or concerns related to possible progression of her cancer develop, such as unintentional weight loss, fatigue, new onset respiratory symptoms, localized pain.    She is followed for atypical CLL by Dr. Sravani Huang.  Watchful waiting for now   Follow up every 3 months      History of recurrent migraines   - Avoid NSAIDs for her migraines - explained need for adequate kidney function for pemetrexed infusion.      Anxiety   - meditating, doing Yoga, Des and saleem Chi at [facility]  - uses Xanax as needed.   Requested a prescription to be sent to [facility] today       This note was created with the assistance of a speech recognition program.  Although the intention is to generate a document that actually reflects the content of the visit, it is possible that some mistakes occur and may not be corrected by the time of completion of this note.        Cathy Alva MD, MS  Thoracic Medical Oncology   [id number] [id number]  Phone: [phone]

## 2024-05-30 ENCOUNTER — OFFICE VISIT (OUTPATIENT)
Dept: HEMATOLOGY/ONCOLOGY | Facility: HOSPITAL | Age: 79
End: 2024-05-30
Payer: MEDICARE

## 2024-05-30 VITALS
SYSTOLIC BLOOD PRESSURE: 130 MMHG | WEIGHT: 124.12 LBS | RESPIRATION RATE: 20 BRPM | OXYGEN SATURATION: 100 % | HEART RATE: 94 BPM | BODY MASS INDEX: 21.99 KG/M2 | TEMPERATURE: 97.2 F | DIASTOLIC BLOOD PRESSURE: 63 MMHG | HEIGHT: 63 IN

## 2024-05-30 DIAGNOSIS — C34.11 PRIMARY MALIGNANT NEOPLASM OF RIGHT UPPER LOBE OF LUNG (MULTI): ICD-10-CM

## 2024-05-30 DIAGNOSIS — C34.11 CANCER OF UPPER LOBE OF RIGHT LUNG (MULTI): Primary | ICD-10-CM

## 2024-05-30 DIAGNOSIS — E27.8 ADRENAL MASS (MULTI): ICD-10-CM

## 2024-05-30 DIAGNOSIS — C34.91 PRIMARY MALIGNANT NEOPLASM OF RIGHT LUNG METASTATIC TO OTHER SITE (MULTI): Primary | ICD-10-CM

## 2024-05-30 PROCEDURE — 99215 OFFICE O/P EST HI 40 MIN: CPT | Performed by: INTERNAL MEDICINE

## 2024-05-30 PROCEDURE — 3078F DIAST BP <80 MM HG: CPT | Performed by: INTERNAL MEDICINE

## 2024-05-30 PROCEDURE — 3075F SYST BP GE 130 - 139MM HG: CPT | Performed by: INTERNAL MEDICINE

## 2024-05-30 PROCEDURE — 1126F AMNT PAIN NOTED NONE PRSNT: CPT | Performed by: INTERNAL MEDICINE

## 2024-05-30 PROCEDURE — 1159F MED LIST DOCD IN RCRD: CPT | Performed by: INTERNAL MEDICINE

## 2024-05-30 ASSESSMENT — PAIN SCALES - GENERAL: PAINLEVEL: 0-NO PAIN

## 2024-06-04 ENCOUNTER — LAB (OUTPATIENT)
Dept: LAB | Facility: CLINIC | Age: 79
End: 2024-06-04
Payer: MEDICARE

## 2024-06-04 ENCOUNTER — OFFICE VISIT (OUTPATIENT)
Dept: HEMATOLOGY/ONCOLOGY | Facility: CLINIC | Age: 79
End: 2024-06-04
Payer: MEDICARE

## 2024-06-04 VITALS
OXYGEN SATURATION: 98 % | BODY MASS INDEX: 21.74 KG/M2 | SYSTOLIC BLOOD PRESSURE: 130 MMHG | DIASTOLIC BLOOD PRESSURE: 75 MMHG | TEMPERATURE: 98.2 F | WEIGHT: 124.56 LBS | RESPIRATION RATE: 18 BRPM | HEART RATE: 98 BPM

## 2024-06-04 DIAGNOSIS — C85.90 NON-HODGKIN'S LYMPHOMA, UNSPECIFIED BODY REGION, UNSPECIFIED NON-HODGKIN LYMPHOMA TYPE (MULTI): ICD-10-CM

## 2024-06-04 DIAGNOSIS — C34.11 PRIMARY MALIGNANT NEOPLASM OF RIGHT UPPER LOBE OF LUNG (MULTI): ICD-10-CM

## 2024-06-04 LAB
ALBUMIN SERPL BCP-MCNC: 3.9 G/DL (ref 3.4–5)
ALP SERPL-CCNC: 72 U/L (ref 33–136)
ALT SERPL W P-5'-P-CCNC: 12 U/L (ref 7–45)
ANION GAP SERPL CALC-SCNC: 14 MMOL/L (ref 10–20)
AST SERPL W P-5'-P-CCNC: 17 U/L (ref 9–39)
BASOPHILS # BLD MANUAL: 0 X10*3/UL (ref 0–0.1)
BASOPHILS NFR BLD MANUAL: 0 %
BILIRUB SERPL-MCNC: 0.4 MG/DL (ref 0–1.2)
BUN SERPL-MCNC: 18 MG/DL (ref 6–23)
CALCIUM SERPL-MCNC: 8.7 MG/DL (ref 8.6–10.6)
CHLORIDE SERPL-SCNC: 104 MMOL/L (ref 98–107)
CO2 SERPL-SCNC: 26 MMOL/L (ref 21–32)
CREAT SERPL-MCNC: 0.89 MG/DL (ref 0.5–1.05)
CREAT SERPL-MCNC: 1 MG/DL (ref 0.6–1.3)
EGFRCR SERPLBLD CKD-EPI 2021: 66 ML/MIN/1.73M*2
EOSINOPHIL # BLD MANUAL: 0.5 X10*3/UL (ref 0–0.4)
EOSINOPHIL NFR BLD MANUAL: 1 %
ERYTHROCYTE [DISTWIDTH] IN BLOOD BY AUTOMATED COUNT: 13.2 % (ref 11.5–14.5)
GFR SERPL CREATININE-BSD FRML MDRD: 57 ML/MIN/1.73M*2
GLUCOSE SERPL-MCNC: 98 MG/DL (ref 74–99)
HCT VFR BLD AUTO: 36.7 % (ref 36–46)
HGB BLD-MCNC: 11.7 G/DL (ref 12–16)
IMM GRANULOCYTES # BLD AUTO: 0.08 X10*3/UL (ref 0–0.5)
IMM GRANULOCYTES NFR BLD AUTO: 0.2 % (ref 0–0.9)
LYMPHOCYTES # BLD MANUAL: 36.63 X10*3/UL (ref 0.8–3)
LYMPHOCYTES NFR BLD MANUAL: 74 %
MCH RBC QN AUTO: 28.2 PG (ref 26–34)
MCHC RBC AUTO-ENTMCNC: 31.9 G/DL (ref 32–36)
MCV RBC AUTO: 88 FL (ref 80–100)
MONOCYTES # BLD MANUAL: 3.47 X10*3/UL (ref 0.05–0.8)
MONOCYTES NFR BLD MANUAL: 7 %
NEUTS SEG # BLD MANUAL: 4.46 X10*3/UL (ref 1.6–5)
NEUTS SEG NFR BLD MANUAL: 9 %
NRBC BLD-RTO: ABNORMAL /100{WBCS}
OVALOCYTES BLD QL SMEAR: ABNORMAL
PLATELET # BLD AUTO: 159 X10*3/UL (ref 150–450)
POTASSIUM SERPL-SCNC: 4 MMOL/L (ref 3.5–5.3)
PROT SERPL-MCNC: 6.4 G/DL (ref 6.4–8.2)
RBC # BLD AUTO: 4.15 X10*6/UL (ref 4–5.2)
RBC MORPH BLD: ABNORMAL
SCHISTOCYTES BLD QL SMEAR: ABNORMAL
SODIUM SERPL-SCNC: 140 MMOL/L (ref 136–145)
TOTAL CELLS COUNTED BLD: 100
VARIANT LYMPHS # BLD MANUAL: 4.46 X10*3/UL (ref 0–0.3)
VARIANT LYMPHS NFR BLD: 9 %
WBC # BLD AUTO: 49.5 X10*3/UL (ref 4.4–11.3)

## 2024-06-04 PROCEDURE — 1159F MED LIST DOCD IN RCRD: CPT | Performed by: INTERNAL MEDICINE

## 2024-06-04 PROCEDURE — 80053 COMPREHEN METABOLIC PANEL: CPT

## 2024-06-04 PROCEDURE — 85007 BL SMEAR W/DIFF WBC COUNT: CPT

## 2024-06-04 PROCEDURE — 82565 ASSAY OF CREATININE: CPT | Mod: 59 | Performed by: INTERNAL MEDICINE

## 2024-06-04 PROCEDURE — 3078F DIAST BP <80 MM HG: CPT | Performed by: INTERNAL MEDICINE

## 2024-06-04 PROCEDURE — 99214 OFFICE O/P EST MOD 30 MIN: CPT | Performed by: INTERNAL MEDICINE

## 2024-06-04 PROCEDURE — 36415 COLL VENOUS BLD VENIPUNCTURE: CPT

## 2024-06-04 PROCEDURE — 1126F AMNT PAIN NOTED NONE PRSNT: CPT | Performed by: INTERNAL MEDICINE

## 2024-06-04 PROCEDURE — 3075F SYST BP GE 130 - 139MM HG: CPT | Performed by: INTERNAL MEDICINE

## 2024-06-04 PROCEDURE — 85027 COMPLETE CBC AUTOMATED: CPT

## 2024-06-04 ASSESSMENT — PAIN SCALES - GENERAL: PAINLEVEL: 0-NO PAIN

## 2024-06-04 ASSESSMENT — ENCOUNTER SYMPTOMS
LOSS OF SENSATION IN FEET: 0
OCCASIONAL FEELINGS OF UNSTEADINESS: 0
DEPRESSION: 0

## 2024-06-04 NOTE — PROGRESS NOTES
Patient ID: Herlinda Springer is a 78 y.o. female.    Diagnosis:   Problem List Items Addressed This Visit    None       Treatment:   Oncology History Overview Note   Lymphocytosis 11/2023 CLL vs marginal zone lymphoma    Flow showing a CD5 positive, CD23 positive lymphoproliferative disorder, strong expression of CD20 and Cv808u and surface light chain atypical for CLL and MZL vs LPL was favored, who was referred by thoracic oncologist Dr Alva for workup of lymphocytosis. Patient has had very mild leukocytosis 12K since 3/2023, however increase to 22.3 in November with preserved hgb and platelets..     Additional database:     SPEP faint IgM kappa and free lambd lyte chsins too low to quantitate, B2 2.9,  ( nl)  IgH heavy chain, not mutated  NGS results:   DISEASE ASSOCIATED GENOMIC FINDINGS:   AUSTIN p.* (NM_[id number] c.[id number]>T)  TP53 p.N239D (NM_[id number] c.715A>G)  TP53 p.W126Duq*21 ([id number] c.305_306delinsA)     CT scans to followup on her lung cancer 2/26/24 shows no splenomegally and no lymphadenopathy     CLL (chronic lymphocytic leukemia) (Multi)   3/6/2024 Initial Diagnosis    CLL (chronic lymphocytic leukemia) (CMS/HCC)         Response:     Past Medical History:    Has been treated for high cholesterol     Past Medical History:   Diagnosis Date    Acute maxillary sinusitis, unspecified 09/04/2019    Acute non-recurrent maxillary sinusitis    Acute upper respiratory infection, unspecified 01/16/2015    Acute URI    Acute upper respiratory infection, unspecified 02/15/2018    Acute URI    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    Benign neoplasm of pituitary gland (Multi) 02/15/2018    Microprolactinoma    Encounter for other preprocedural examination     Preoperative clearance    Episodic paroxysmal hemicrania, 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    Paroxysmal hemicrania    Essential (primary) hypertension 02/15/2018    White coat syndrome with hypertension    Laceration  without foreign body of scalp, initial encounter 01/15/2014    Laceration of scalp    Lobar pneumonia, unspecified organism (CMS-HCC) 09/18/2019    Lobar pneumonia    Melanocytic nevi, unspecified 02/15/2018    Benign mole    Migraine with aura, not intractable, without status migrainosus 02/15/2018    Classic migraine with aura    Other conditions influencing health status 02/15/2018    History of cough    Other conditions influencing health status 11/21/2014    History of cough    Other conditions influencing health status 10/12/2015    No significant past surgical history    Other specified disorders of bone density and structure, unspecified site 02/15/2018    Osteopenia    Pain in left ankle and joints of left foot     Chronic pain of left ankle    Pain in left ankle and joints of left foot 05/11/2017    Acute left ankle pain    Pain in left knee     Acute pain of left knee    Pain in left knee 05/11/2017    Acute pain of left knee    Pain in left knee 02/15/2018    Acute pain of left knee    Personal history of other diseases of the musculoskeletal system and connective tissue 06/18/2015    History of muscle pain    Personal history of other diseases of the respiratory system 11/17/2014    History of acute sinusitis    Personal history of other diseases of the respiratory system 06/06/2018    History of acute bronchitis    Personal history of other diseases of the respiratory system 02/15/2018    History of acute sinusitis    Personal history of other endocrine, nutritional and metabolic disease 06/22/2016    History of vitamin D deficiency    Personal history of other specified conditions 09/16/2019    History of chronic cough    Personal history of other specified conditions 02/15/2018    History of shortness of breath    Personal history of other specified conditions 01/13/2016    History of shortness of breath    Preglaucoma, unspecified, bilateral 10/02/2015    Glaucoma suspect, both eyes    Preglaucoma,  unspecified, unspecified eye     Glaucoma suspect    Primary open-angle glaucoma, left eye, severe stage 10/26/2016    Primary open angle glaucoma of left eye, severe stage    Primary open-angle glaucoma, right eye, moderate stage 2022    Primary open angle glaucoma of right eye, moderate stage    Rupture of popliteal cyst 2015    Ruptured Bakers cyst    Rupture of popliteal cyst 02/15/2018    Ruptured Bakers cyst    Unspecified asthma with (acute[facility] exacerb[facility] 2019    Acute asthma exacerbation    Unspecified asthma with (acute) exacerbation ([facility]) 2018    Acute asthma exacerbation    Unspecified blepharitis left eye, unspecified eyelid 10/12/2015    Blepharitis of left eye    Unspecified glaucoma 02/15/2018    Glaucoma       Surgical History:     Past Surgical History:   Procedure Laterality Date    OTHER SURGICAL HISTORY  10/12/2015    Anal Fissurectomy    TONSILLECTOMY  2014    Tonsillectomy        Family History:   No cancers in family, one sister age 90, sister  in  after falling.   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 no children. Light Former smoker as a younger adult, born in [place] emig[place] in .   Social History     Tobacco Use    Smoking status: Former     Types: Cigarettes   Vaping Use    Vaping status: Never Used   Substance Use Topics    Alcohol use: Never    Drug use: Never      -------------------------------------------------------------------------------------------------------  Subjective       HPI    Ms Springer is a 77 yo female h/o stage IV NSCLC (RUL mass, LT adrenal mets, mediastinal and cervical LN) s/p carbo/pem/pem (carbo given in , pembro finished 2023) and RT now with stable disease (on surveillance),  lymphocytosis with flow showing a CD5 positive, CD23 positive lymphoproliferative disorder,  strong expression of CD20 and Eo539k and surface light chain atypical for CLL and  MZL vs LPL was favored, who was referred by thoracic oncologist Dr Alva for workup of lymphocytosis. Patient has had very mild leukocytosis 12K since 3/2023, however increase to 22.3 in November prompted further evaluation. She has preserved hgb and platelets.     Additional database:     SPEP faint IgM kappa and free lambd lyte chsins too low to quantitate, B2 2.9,  ( nl)  IgH heavy chain, not mutated  NGS results:   DISEASE ASSOCIATED GENOMIC FINDINGS:   AUSTIN p.* (NM_000051 c.3574A>T)  TP53 p.N239D (NM_000546 c.715A>G)  TP53 p.R299Raz*21 ([id number] c.305_306delinsA)   CT scans to followup on her lung cancer 2/26/24 shows no splenomegally and no lymphadenopathy    Patient is here with her  today 6/4/24 for followup consultation regarding newly diagnosed b cell lymphoproliferative disorder. Last  CBC 5/22/24 shows wbc of 48.4 up from 27.6 2/22/24  hgb 11.5 and platlets 193,000. CT imaging 5/24/24 showed no concern for progressive lung cancer or lymphoma although mild splenomegally of 14.8 cm increased from 12.7 cm on last scan.  Denies fevers, sweats, difficulty with eating. Had a pipe burst in the winter which caused some stress.    Review of Systems   All other systems reviewed and are negative.     -------------------------------------------------------------------------------------------------------  Objective   BSA: There is no height or weight on file to calculate BSA.  There were no vitals taken for this visit.    Physical Exam  Constitutional:       Appearance: Normal appearance.      Comments: Fit and well looks a decade younger than stated age   HENT:      Head: Normocephalic and atraumatic.      Nose: Nose normal.   Eyes:      Extraocular Movements: Extraocular movements intact.      Conjunctiva/sclera:  Conjunctivae normal.      Pupils: Pupils are equal, round, and reactive to light.   Cardiovascular:      Rate and Rhythm: Normal rate and regular rhythm.   Pulmonary:      Breath sounds: Normal breath sounds.   Chest:   Breasts:     Right: Normal.      Left: Normal.   Abdominal:      General: Abdomen is flat. Bowel sounds are normal.      Palpations: Abdomen is soft.   Musculoskeletal:         General: Normal range of motion.   Skin:     General: Skin is warm and dry.   Neurological:      General: No focal deficit present.      Mental Status: She is oriented to person, place, and time.   Psychiatric:         Mood and Affect: Mood normal.         Behavior: Behavior normal.         Thought Content: Thought content normal.       Performance Status:  Asymptomatic  -------------------------------------------------------------------------------------------------------  Assessment/Plan      Ms Springer is a 77 yo female h/o stage CLIFTON NSCLC (RUL mass, LT adrenal mets, mediastinal and cervical LN) s/p carbo/pem/pem (carbo given in 2021, pembro finished 8/2023) and RT (lung mass and adrenal mass) now with stable disease (on surveillance), new onset CD5, CD23 lymphocytosis with flow c/f MZL vs LPL, HTN, hypothyroidism, asthma, who was referred by thoracic oncologist Dr Alva for workup of lymphocytosis.     1 Atypical CLL    Lymphocytosis first noticed in 3/2023 at 12k, but increased to 22.3 in November 2023.  She has preserved hgb and platelets  and is assymptomatic. ALC doubled in 2023. New borderline splenomegaly on CT in 10/2023.  PB flow showed CD5+ clonal ppl, atypical for CLL, mainly concern for MZL vs LPL.  Additional database notable for TP 53 mutations, absence of MYD88 goes against LPL, so I suspect this is likely an atypical CLL.     WBC   Date Value Ref Range Status   06/04/2024 49.5 (H) 4.4 - 11.3 x10*3/uL Final   05/22/2024 48.4 (H) 4.4 - 11.3 x10*3/uL Final   02/22/2024 27.6 (H) 4.4 - 11.3 x10*3/uL Final    02/12/2024 35.9 (H) 4.4 - 11.3 x10*3/uL Final   11/21/2023 22.3 (H) 4.4 - 11.3 x10*3/uL Final     Hemoglobin   Date Value Ref Range Status   06/04/2024 11.7 (L) 12.0 - 16.0 g/dL Final   05/22/2024 11.5 (L) 12.0 - 16.0 g/dL Final   02/22/2024 11.7 (L) 12.0 - 16.0 g/dL Final   02/12/2024 12.7 12.0 - 16.0 g/dL Final   11/21/2023 12.2 12.0 - 16.0 g/dL Final     6/3/24 Wbc slowly increasing with generally preserved hgb and platelets so no indications for treatment at present, spleen has increased to 14.7 cm from normal as of most recent CT imaging on 5/24/24.  No indications to start therapy.  Would favor zanubrutinib when treatment is needed, briefly reviewed goals, scheduling and side effects.     I again explained to the patient and her  that these are generally indolent conditions, more common in older patient  and that need for treatment is generally based on trajectory of blood counts and development of cytopenias and symptomatic disease.  Accorder to IPSS score for assymptomatic CLL ( Condolucci Blood 2020 135(21):1859, she has two adverse markers lymhocytosis > 15 and unmutated IgG heavy variable chain) so chance of requeireing requireing therapy in next 5 years 61%.  We discussed that if treatment is needed would mayra utilize b cell directed treatment such btk inhibitors with or without CD 20 antibodies.  For now recommended watchful waiting.      RTC: 2 month for provider visit and labs.         -------------------------------------------------------------------------------------------------------

## 2024-06-07 ENCOUNTER — TELEPHONE (OUTPATIENT)
Dept: HEMATOLOGY/ONCOLOGY | Facility: HOSPITAL | Age: 79
End: 2024-06-07
Payer: MEDICARE

## 2024-06-07 DIAGNOSIS — F41.9 ANXIETY: ICD-10-CM

## 2024-06-07 RX ORDER — ALPRAZOLAM 0.5 MG/1
0.5 TABLET ORAL 3 TIMES DAILY PRN
Qty: 90 TABLET | Refills: 0 | Status: SHIPPED | OUTPATIENT
Start: 2024-06-07 | End: 2024-07-07

## 2024-06-07 NOTE — TELEPHONE ENCOUNTER
Refill request received for Alprazolam 0.5mg.  Preferred pharmacy is [facility] at [address] [facility] in [place].  Message sent to team.

## 2024-07-18 ENCOUNTER — OFFICE VISIT (OUTPATIENT)
Dept: PRIMARY CARE | Facility: CLINIC | Age: 79
End: 2024-07-18
Payer: MEDICARE

## 2024-07-18 VITALS
BODY MASS INDEX: 21.42 KG/M2 | SYSTOLIC BLOOD PRESSURE: 108 MMHG | HEART RATE: 93 BPM | WEIGHT: 125.44 LBS | TEMPERATURE: 97.3 F | OXYGEN SATURATION: 99 % | RESPIRATION RATE: 16 BRPM | DIASTOLIC BLOOD PRESSURE: 60 MMHG | HEIGHT: 64 IN

## 2024-07-18 DIAGNOSIS — I10 ESSENTIAL HYPERTENSION: ICD-10-CM

## 2024-07-18 DIAGNOSIS — E78.5 HYPERLIPIDEMIA, UNSPECIFIED HYPERLIPIDEMIA TYPE: ICD-10-CM

## 2024-07-18 DIAGNOSIS — Z00.00 WELLNESS EXAMINATION: ICD-10-CM

## 2024-07-18 DIAGNOSIS — E03.9 ACQUIRED HYPOTHYROIDISM: ICD-10-CM

## 2024-07-18 DIAGNOSIS — Z12.31 VISIT FOR SCREENING MAMMOGRAM: ICD-10-CM

## 2024-07-18 DIAGNOSIS — E78.1 PURE HYPERTRIGLYCERIDEMIA: ICD-10-CM

## 2024-07-18 DIAGNOSIS — Z00.00 ROUTINE GENERAL MEDICAL EXAMINATION AT HEALTH CARE FACILITY: Primary | ICD-10-CM

## 2024-07-18 DIAGNOSIS — E55.9 VITAMIN D DEFICIENCY, UNSPECIFIED: ICD-10-CM

## 2024-07-18 PROCEDURE — 1170F FXNL STATUS ASSESSED: CPT | Performed by: INTERNAL MEDICINE

## 2024-07-18 PROCEDURE — 99214 OFFICE O/P EST MOD 30 MIN: CPT | Performed by: INTERNAL MEDICINE

## 2024-07-18 PROCEDURE — 3078F DIAST BP <80 MM HG: CPT | Performed by: INTERNAL MEDICINE

## 2024-07-18 PROCEDURE — G0439 PPPS, SUBSEQ VISIT: HCPCS | Performed by: INTERNAL MEDICINE

## 2024-07-18 PROCEDURE — 3074F SYST BP LT 130 MM HG: CPT | Performed by: INTERNAL MEDICINE

## 2024-07-18 PROCEDURE — 1126F AMNT PAIN NOTED NONE PRSNT: CPT | Performed by: INTERNAL MEDICINE

## 2024-07-18 PROCEDURE — 1159F MED LIST DOCD IN RCRD: CPT | Performed by: INTERNAL MEDICINE

## 2024-07-18 PROCEDURE — 1160F RVW MEDS BY RX/DR IN RCRD: CPT | Performed by: INTERNAL MEDICINE

## 2024-07-18 RX ORDER — ROSUVASTATIN CALCIUM 10 MG/1
5 TABLET, COATED ORAL DAILY
Qty: 90 TABLET | Refills: 1 | Status: SHIPPED | OUTPATIENT
Start: 2024-07-18

## 2024-07-18 ASSESSMENT — ENCOUNTER SYMPTOMS
ABDOMINAL PAIN: 0
DEPRESSION: 0
BLOOD IN STOOL: 0
LOSS OF SENSATION IN FEET: 0
NECK PAIN: 0
OCCASIONAL FEELINGS OF UNSTEADINESS: 0
DIFFICULTY URINATING: 0
ABDOMINAL DISTENTION: 0
SHORTNESS OF BREATH: 0
BACK PAIN: 0
FREQUENCY: 0
PALPITATIONS: 0

## 2024-07-18 ASSESSMENT — PAIN SCALES - GENERAL: PAINLEVEL: 0-NO PAIN

## 2024-07-18 ASSESSMENT — ACTIVITIES OF DAILY LIVING (ADL)
MANAGING_FINANCES: INDEPENDENT
DOING_HOUSEWORK: INDEPENDENT
GROCERY_SHOPPING: INDEPENDENT
BATHING: INDEPENDENT
TAKING_MEDICATION: INDEPENDENT
DRESSING: INDEPENDENT

## 2024-07-18 NOTE — PATIENT INSTRUCTIONS
Continue healthy life style, complete flu, covid and RSV vaccine in the fall. Return to lab soon fasting. Return to us sooner than one year as needed.  
03-Jan-2023 11:05

## 2024-07-18 NOTE — PROGRESS NOTES
"Subjective   Patient ID: Herlinda Springer is a 78 y.o. female who presents for Medicare Annual Wellness Visit Subsequent.    HPI Her diet is balance , any kind of food, she exercises walking for about 1 hours plus , daily 20 min using trampolin, indoors or outdoors year around.  She has been off bp meds over 1 year ago , brings bp readings systolic  100 to 120 , diastolics   She is using only  5 mg rosuvastatin. Pending lipid  Pt was  on Katura for stage IV non small lung cancer , it was stopped in August 2023 due to leukocytosis, currently consider either chronic lymphocitic leukemia or lymphoma.   She continues using alprazolam tid by oncologist, no panick attacks, denies agitation, she is not using therapist.  She has consult in [facility] .    Review of Systems   Respiratory:  Negative for shortness of breath.    Cardiovascular:  Negative for chest pain and palpitations.   Gastrointestinal:  Negative for abdominal distention, abdominal pain and blood in stool.        Bm daily on fiber and laxatine   Genitourinary:  Negative for difficulty urinating, frequency and pelvic pain.   Musculoskeletal:  Negative for back pain and neck pain.   All other systems reviewed and are negative.      Objective   /60   Pulse 93   Temp 36.3 °C (97.3 °F)   Resp 16   Ht 1.626 m (5' 4\")   Wt 56.9 kg (125 lb 7.1 oz)   SpO2 99%   BMI 21.53 kg/m²     Physical Exam  Eyes - conjunctiva clear, PERRLA  HEENT - no impacted wax  Neck - No cervical lymphadenopathy, no thyromegaly  Axilla - no palpable lymphadenopathy  Breast -palpation: nontender, no palpable nodules, retractions or discharge bilaterally  Cardiac - regular rate and rhythm, no murmurs, no carotid bruit, no JVP  Lung- clear to auscultation, no rales, no rhonchi, no wheezing  GI- normally active bowel sounds, nontender, nondistended, no hepatomegaly , palpable splenomegaly, no rebound  MSK - bilateral bunions   Neuro - non focal, oriented x 3  Extremities - no " edema, good distal arterial pulses,few spider veins in bilateral ankles  Skin - no rash, few moles  Psychiatric - pleasant, cooperative, no hallucinations    Assessment/Plan   Problem List Items Addressed This Visit             ICD-10-CM    Hyperlipidemia E78.5     Pendinglipid , using only  5m g rosuvastatin         Relevant Medications    rosuvastatin (Crestor) 10 mg tablet    Other Relevant Orders    Lipid panel    Hypothyroidism E03.9     Continue current dose .         Essential hypertension I10     Control only with life style modifications.         Wellness examination Z00.00     Discussed diet, exercise, immunizations, and screening appropriate for their age.  Colonoscopy: 2015, discussed with GI no longer needed.  Dexa: 2023 normal  Mammogram:  summer 2023              Other Visit Diagnoses         Codes    Routine general medical examination at health care facility    -  Primary Z00.00    Visit for screening mammogram     Z12.31    Relevant Orders    BI mammo bilateral screening tomosynthesis    Vitamin D deficiency, unspecified     E55.9    Relevant Orders    Vitamin D 25-Hydroxy,Total (for eval of Vitamin D levels)

## 2024-07-18 NOTE — PROGRESS NOTES
"Subjective   Reason for Visit: Herlinda Springer is an 78 y.o. female here for a Medicare Wellness visit.     Past Medical, Surgical, and Family History reviewed and updated in chart.    Reviewed all medications by prescribing practitioner or clinical pharmacist (such as prescriptions, OTCs, herbal therapies and supplements) and documented in the medical record.    HPI    Patient Care Team:  Alley Bui MD as PCP - General (Internal Medicine)  Cathy Alva MD as Consulting Physician (Hematology and Oncology)  Sravani Huang MD as Consulting Physician (Hematology and Oncology)  Connie Quinones, RN as Registered Nurse (Hematology and Oncology)  Maxwell Camp, RN as Registered Nurse (Hematology and Oncology)     Review of Systems    Objective   Vitals:  /60   Pulse 93   Temp 36.3 °C (97.3 °F)   Resp 16   Ht 1.626 m (5' 4\")   Wt 56.9 kg (125 lb 7.1 oz)   SpO2 99%   BMI 21.53 kg/m²       Physical Exam    Assessment/Plan   Problem List Items Addressed This Visit     Hyperlipidemia    Relevant Medications    rosuvastatin (Crestor) 10 mg tablet    Other Relevant Orders    Lipid panel    Wellness examination    Current Assessment & Plan     Discussed diet, exercise, immunizations, and screening appropriate for their age.  Colonoscopy: 2015  Dexa: 2023 normal  Mammogram:  summer 2023            Other Visit Diagnoses     Routine general medical examination at health care facility    -  Primary    Visit for screening mammogram        Relevant Orders    BI mammo bilateral screening tomosynthesis    Vitamin D deficiency, unspecified        Relevant Orders    Vitamin D 25-Hydroxy,Total (for eval of Vitamin D levels)             "

## 2024-07-18 NOTE — ASSESSMENT & PLAN NOTE
Discussed diet, exercise, immunizations, and screening appropriate for their age.  Colonoscopy: 2015, discussed with GI no longer needed.  Dexa: 2023 normal  Mammogram:  summer 2023

## 2024-07-23 ENCOUNTER — TELEPHONE (OUTPATIENT)
Dept: ADMISSION | Facility: HOSPITAL | Age: 79
End: 2024-07-23
Payer: MEDICARE

## 2024-07-23 DIAGNOSIS — F41.9 ANXIETY: ICD-10-CM

## 2024-07-23 RX ORDER — ALPRAZOLAM 0.5 MG/1
0.5 TABLET ORAL 3 TIMES DAILY PRN
Qty: 90 TABLET | Refills: 0 | Status: SHIPPED | OUTPATIENT
Start: 2024-07-23 | End: 2024-07-25 | Stop reason: SDUPTHER

## 2024-07-23 NOTE — TELEPHONE ENCOUNTER
Pt has a FUV with Dr. Huang on 8/19. She wants to know if she was fine to have her labs done on 8/14. I let her know this was fine. No further questions.

## 2024-07-23 NOTE — TELEPHONE ENCOUNTER
Herlinda Springer called the refill line for Alprazolam 0.5mg #90. Requesting refills be sent to [facility] pharmacy; message sent to team to submit.

## 2024-07-25 DIAGNOSIS — F41.9 ANXIETY: ICD-10-CM

## 2024-07-25 RX ORDER — ALPRAZOLAM 0.5 MG/1
0.5 TABLET ORAL 3 TIMES DAILY PRN
Qty: 90 TABLET | Refills: 0 | Status: SHIPPED | OUTPATIENT
Start: 2024-07-25 | End: 2024-08-24

## 2024-07-25 NOTE — TELEPHONE ENCOUNTER
Pharmacy informed patient they never received rx; she is asking if team can please resend to [facility]. No further questions or concerns at this time.

## 2024-07-31 ENCOUNTER — APPOINTMENT (OUTPATIENT)
Dept: INTEGRATIVE MEDICINE | Facility: CLINIC | Age: 79
End: 2024-07-31
Payer: MEDICARE

## 2024-07-31 DIAGNOSIS — C34.90 PRIMARY MALIGNANT NEOPLASM OF LUNG METASTATIC TO OTHER SITE, UNSPECIFIED LATERALITY (MULTI): Primary | ICD-10-CM

## 2024-07-31 PROCEDURE — 99205 OFFICE O/P NEW HI 60 MIN: CPT | Performed by: HOSPITALIST

## 2024-07-31 NOTE — PROGRESS NOTES
Patient ID: Herlinda Springer is a 78 y.o. female.  Referring Physician: No referring provider defined for this encounter.  Primary Care Provider: Alley Bui MD    CANCER HISTORY:   Ms Springer is a 79 yo female h/o stage CLIFTON NSCLC (RUL mass, LT adrenal mets, mediastinal and cervical LN) s/p carbo/pem/pem (carbo given in 2021, pembro finished 8/2023) and RT (lung mass and adrenal mass) now with stable disease (on surveillance), new onset CD5, CD23 lymphocytosis with flow c/f MZL vs LPL, HTN, hypothyroidism, asthma, with atypical CLL on observation    SITES OF DISEASE  Right upper lobe  Left adrenal gland   Brain - left parietal lobe      MOLECULAR GENOMICS  TP53 mutant   EGFR negative  ALK-1 negative   ROS1 negative  BRAF V600E negative  NTRK negative      PD-L1 TPS < 1%     PRIOR THERAPIES  Cycle # 1 carboplatin/pemetrexed/pembrolizumab - 01/15/20, cycle # 4 on 03/18/20  Starts maintenance pemetrexed/pembrolizumab on 04/08/20  Pemetrexed discontinued on 03/01/2023 due to decline GFR'   Last pembrolizumab dose on 08/02/2023     CURRENT THERAPY  Observation     INTEGRATIVE HISTORY:  Symptoms:  Migraines    Anxiety - meditating, doing yoga/roland/Saleem Chi at [place]   Xanex prn    Diet: h/o diverticulosis  Would check leaky gut  Overall good    PA: walking everyday    Sleep: sleeping well - Zquil    Stress: managed with alprazolam   Qi gong/saleem chi, yoga, meditation, REIKI  Does Self-REIKI, 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  There were no vitals taken for this visit.    PHYSICAL EXAM:  NAD, awake/alert  HEENT, NCAT, OP clear, no oral lesions  CTA bilat  RRR no mgr  Abd soft/nt/nd+bs  No c/c/e/ttp  Motor/sensory intact, CN 2-12 intact      RESULTS:  Lab Results   Component Value Date    WBC 49.5 (H) 06/04/2024    HGB 11.7 (L) 06/04/2024    HCT 36.7 06/04/2024     06/04/2024     02/12/2024    CREATININE 0.89 06/04/2024    AST 17 06/04/2024       Integrative Labs:    Functional Tests:    Assessment/Plan   Metastatic lung cancer on observation s/p carbo/alimta/pembro them pembrolizumab and radiation mult times now on observation, see history  Now with atypical CLL    CANCER SPECIFIC RECCS:  Biomat or infrared sauna  VIOME testing - full body intelligence  Consider [facility] Functional Medicine    LIFESTYLE:  Diet - 5-9 fruits/veg/day (7 veg to 2 fruits)  Limit sugar  Cruciferous Vegetables - Brussel Sprouts, Kale, Broccoli, Cauliflower  Plant based anti-inflammatory whole foods diet  AICR New American Plate  PHYSICAL ACTIVITY 30 MIN/DAY    Reading:  Anticancer Living  Cancer Fighting Kitchen    Websites:  Cook for your Life  [email or web address]    SYMPTOM MANAGEMENT:  Anxiety: using alprazolam and working for reducing pain etc.   Consider Support Groups  Continue meditation, yoga etc. At [place]    Integrative Modalities to consider:  Massage Therapy - pain/anxiety  Acupuncture - for pain/anxiety  Reiki    Deep breathing: Alternate nostril breathing and Deep abdominal breathing (5 min) in the morning  Yoga  walking, gardening is great  Focusing gratitude  [facility] - Guided Meditation    Migraines - acupuncture    Natural Products to Consider:  Vitamin D3/K2  Turmeric (Dionne) - taking  Ashwaghanda (Dionne) or PROZE NODZZ  Essential oils: Consider Lavender in a diffuser    Follow up: IO 4 months  [facility]    Thank you for consulting me in for this patient  Laz Hernandez MD

## 2024-08-02 ENCOUNTER — HOSPITAL ENCOUNTER (OUTPATIENT)
Dept: RADIOLOGY | Facility: CLINIC | Age: 79
Discharge: HOME | End: 2024-08-02
Payer: MEDICARE

## 2024-08-02 VITALS — WEIGHT: 123 LBS | BODY MASS INDEX: 21 KG/M2 | HEIGHT: 64 IN

## 2024-08-02 DIAGNOSIS — Z12.31 VISIT FOR SCREENING MAMMOGRAM: ICD-10-CM

## 2024-08-02 DIAGNOSIS — E78.5 HYPERLIPIDEMIA, UNSPECIFIED HYPERLIPIDEMIA TYPE: ICD-10-CM

## 2024-08-02 PROCEDURE — 77067 SCR MAMMO BI INCL CAD: CPT

## 2024-08-02 RX ORDER — ROSUVASTATIN CALCIUM 10 MG/1
5 TABLET, COATED ORAL DAILY
Qty: 90 TABLET | Refills: 1 | Status: SHIPPED | OUTPATIENT
Start: 2024-08-02

## 2024-08-05 ENCOUNTER — APPOINTMENT (OUTPATIENT)
Dept: INTEGRATIVE MEDICINE | Facility: CLINIC | Age: 79
End: 2024-08-05
Payer: MEDICARE

## 2024-08-13 ENCOUNTER — TELEPHONE (OUTPATIENT)
Dept: ADMISSION | Facility: HOSPITAL | Age: 79
End: 2024-08-13

## 2024-08-13 ENCOUNTER — LAB (OUTPATIENT)
Dept: LAB | Facility: LAB | Age: 79
End: 2024-08-13
Payer: MEDICARE

## 2024-08-13 DIAGNOSIS — E55.9 VITAMIN D DEFICIENCY, UNSPECIFIED: ICD-10-CM

## 2024-08-13 DIAGNOSIS — E78.5 HYPERLIPIDEMIA, UNSPECIFIED HYPERLIPIDEMIA TYPE: ICD-10-CM

## 2024-08-13 DIAGNOSIS — C85.90 NON-HODGKIN'S LYMPHOMA, UNSPECIFIED BODY REGION, UNSPECIFIED NON-HODGKIN LYMPHOMA TYPE (MULTI): ICD-10-CM

## 2024-08-13 LAB
25(OH)D3 SERPL-MCNC: 40 NG/ML (ref 30–100)
ALBUMIN SERPL BCP-MCNC: 4.1 G/DL (ref 3.4–5)
ALP SERPL-CCNC: 72 U/L (ref 33–136)
ALT SERPL W P-5'-P-CCNC: 11 U/L (ref 7–45)
ANION GAP SERPL CALC-SCNC: 13 MMOL/L (ref 10–20)
AST SERPL W P-5'-P-CCNC: 17 U/L (ref 9–39)
BASOPHILS # BLD MANUAL: 0 X10*3/UL (ref 0–0.1)
BASOPHILS NFR BLD MANUAL: 0 %
BILIRUB SERPL-MCNC: 0.5 MG/DL (ref 0–1.2)
BUN SERPL-MCNC: 19 MG/DL (ref 6–23)
CALCIUM SERPL-MCNC: 9 MG/DL (ref 8.6–10.6)
CHLORIDE SERPL-SCNC: 103 MMOL/L (ref 98–107)
CHOLEST SERPL-MCNC: 151 MG/DL (ref 0–199)
CHOLESTEROL/HDL RATIO: 2.7
CO2 SERPL-SCNC: 28 MMOL/L (ref 21–32)
CREAT SERPL-MCNC: 0.92 MG/DL (ref 0.5–1.05)
EGFRCR SERPLBLD CKD-EPI 2021: 63 ML/MIN/1.73M*2
EOSINOPHIL # BLD MANUAL: 0 X10*3/UL (ref 0–0.4)
EOSINOPHIL NFR BLD MANUAL: 0 %
ERYTHROCYTE [DISTWIDTH] IN BLOOD BY AUTOMATED COUNT: 13.2 % (ref 11.5–14.5)
GLUCOSE SERPL-MCNC: 86 MG/DL (ref 74–99)
HCT VFR BLD AUTO: 35.7 % (ref 36–46)
HDLC SERPL-MCNC: 55.9 MG/DL
HGB BLD-MCNC: 11 G/DL (ref 12–16)
IMM GRANULOCYTES # BLD AUTO: 0.19 X10*3/UL (ref 0–0.5)
IMM GRANULOCYTES NFR BLD AUTO: 0.3 % (ref 0–0.9)
LDH SERPL L TO P-CCNC: 157 U/L (ref 84–246)
LDLC SERPL CALC-MCNC: 80 MG/DL
LYMPHOCYTES # BLD MANUAL: 51.47 X10*3/UL (ref 0.8–3)
LYMPHOCYTES NFR BLD MANUAL: 72.7 %
MCH RBC QN AUTO: 28 PG (ref 26–34)
MCHC RBC AUTO-ENTMCNC: 30.8 G/DL (ref 32–36)
MCV RBC AUTO: 91 FL (ref 80–100)
MONOCYTES # BLD MANUAL: 9.06 X10*3/UL (ref 0.05–0.8)
MONOCYTES NFR BLD MANUAL: 12.8 %
NEUTS SEG # BLD MANUAL: 4.81 X10*3/UL (ref 1.6–5)
NEUTS SEG NFR BLD MANUAL: 6.8 %
NON HDL CHOLESTEROL: 95 MG/DL (ref 0–149)
NRBC BLD-RTO: 0 /100 WBCS (ref 0–0)
PLATELET # BLD AUTO: 176 X10*3/UL (ref 150–450)
POLYCHROMASIA BLD QL SMEAR: ABNORMAL
POTASSIUM SERPL-SCNC: 4.4 MMOL/L (ref 3.5–5.3)
PROT SERPL-MCNC: 6.4 G/DL (ref 6.4–8.2)
PROT SERPL-MCNC: 6.4 G/DL (ref 6.4–8.2)
RBC # BLD AUTO: 3.93 X10*6/UL (ref 4–5.2)
RBC MORPH BLD: ABNORMAL
SODIUM SERPL-SCNC: 140 MMOL/L (ref 136–145)
TOTAL CELLS COUNTED BLD: 117
TRIGL SERPL-MCNC: 76 MG/DL (ref 0–149)
VARIANT LYMPHS # BLD MANUAL: 5.45 X10*3/UL (ref 0–0.3)
VARIANT LYMPHS NFR BLD: 7.7 %
VLDL: 15 MG/DL (ref 0–40)
WBC # BLD AUTO: 70.8 X10*3/UL (ref 4.4–11.3)

## 2024-08-13 PROCEDURE — 85007 BL SMEAR W/DIFF WBC COUNT: CPT

## 2024-08-13 PROCEDURE — 80053 COMPREHEN METABOLIC PANEL: CPT

## 2024-08-13 PROCEDURE — 86334 IMMUNOFIX E-PHORESIS SERUM: CPT

## 2024-08-13 PROCEDURE — 84165 PROTEIN E-PHORESIS SERUM: CPT

## 2024-08-13 PROCEDURE — 85027 COMPLETE CBC AUTOMATED: CPT

## 2024-08-13 PROCEDURE — 84155 ASSAY OF PROTEIN SERUM: CPT

## 2024-08-13 PROCEDURE — 83615 LACTATE (LD) (LDH) ENZYME: CPT

## 2024-08-13 PROCEDURE — 80061 LIPID PANEL: CPT

## 2024-08-13 PROCEDURE — 36415 COLL VENOUS BLD VENIPUNCTURE: CPT

## 2024-08-13 PROCEDURE — 82306 VITAMIN D 25 HYDROXY: CPT

## 2024-08-13 PROCEDURE — 83521 IG LIGHT CHAINS FREE EACH: CPT

## 2024-08-13 NOTE — TELEPHONE ENCOUNTER
On-call fellow advised that he would review and get back to me although that was over an hour ago, he was contacted again for an update

## 2024-08-13 NOTE — TELEPHONE ENCOUNTER
"Per On-call fellow \"If the pt is asymptomatic, no interventions today and they have to call Dr. Huang office tomorrow. And I will inform her in the AM as well\"    I called the patient who does report feeling well and denies any needs at this time, is disappointed with the result however anxious to speak with Dr. Huang regarding a plan, will call us if she develops any symptoms. Was appreciative of the call although she did see the result on her  MyChart  "

## 2024-08-13 NOTE — TELEPHONE ENCOUNTER
"Received a call from  LAB ([facility]) regarding a critical lab result  I called [facility] back, confirmed that the critical result is WBC: 70.8    Pt of Dr. Huang   Does have a visit 8/19 to discuss results    Per the note on 6/4: \"6/3/24 Wbc slowly increasing with generally preserved hgb and platelets so no indications for treatment at present, spleen has increased to 14.7 cm from normal as of most recent CT imaging on 5/24/24. No indications to start therapy. Would favor zanubrutinib when treatment is needed, briefly reviewed goals, scheduling and side effects.\"    On call fellow notified.  "

## 2024-08-14 LAB
KAPPA LC SERPL-MCNC: 1.46 MG/DL (ref 0.33–1.94)
KAPPA LC/LAMBDA SER: 1.15 {RATIO} (ref 0.26–1.65)
LAMBDA LC SERPL-MCNC: 1.27 MG/DL (ref 0.57–2.63)
PATH REVIEW-CBC DIFFERENTIAL: NORMAL

## 2024-08-14 NOTE — TELEPHONE ENCOUNTER
Pt called back regarding this lab result- very anxious regarding the high WBC and is asking if she needs to see Dr. Huang sooner than her FUV on Monday. Message sent to the team.     c/o generalized bodyaches /sore throat

## 2024-08-15 ENCOUNTER — APPOINTMENT (OUTPATIENT)
Dept: OPHTHALMOLOGY | Facility: CLINIC | Age: 79
End: 2024-08-15
Payer: MEDICARE

## 2024-08-15 DIAGNOSIS — H40.1122 PRIMARY OPEN ANGLE GLAUCOMA OF LEFT EYE, MODERATE STAGE: Primary | ICD-10-CM

## 2024-08-15 DIAGNOSIS — H40.1131 PRIMARY OPEN-ANGLE GLAUCOMA, BILATERAL, MILD STAGE: ICD-10-CM

## 2024-08-15 DIAGNOSIS — Z96.1 PSEUDOPHAKIA OF BOTH EYES: ICD-10-CM

## 2024-08-15 LAB
ALBUMIN: 3.8 G/DL (ref 3.4–5)
ALPHA 1 GLOBULIN: 0.4 G/DL (ref 0.2–0.6)
ALPHA 2 GLOBULIN: 0.8 G/DL (ref 0.4–1.1)
BETA GLOBULIN: 0.7 G/DL (ref 0.5–1.2)
GAMMA GLOBULIN: 0.7 G/DL (ref 0.5–1.4)
IMMUNOFIXATION COMMENT: NORMAL
M-PROTEIN 1: 0.2 G/DL
M-PROTEIN 2: 0.1 G/DL
PATH REVIEW - SERUM IMMUNOFIXATION: NORMAL
PATH REVIEW-SERUM PROTEIN ELECTROPHORESIS: ABNORMAL
PROTEIN ELECTROPHORESIS COMMENT: ABNORMAL

## 2024-08-15 PROCEDURE — 92083 EXTENDED VISUAL FIELD XM: CPT | Performed by: OPHTHALMOLOGY

## 2024-08-15 PROCEDURE — 92133 CPTRZD OPH DX IMG PST SGM ON: CPT | Performed by: OPHTHALMOLOGY

## 2024-08-15 PROCEDURE — 99214 OFFICE O/P EST MOD 30 MIN: CPT | Performed by: OPHTHALMOLOGY

## 2024-08-15 ASSESSMENT — ENCOUNTER SYMPTOMS
EYES NEGATIVE: 0
ALLERGIC/IMMUNOLOGIC NEGATIVE: 0
ENDOCRINE NEGATIVE: 0
MUSCULOSKELETAL NEGATIVE: 0
HEMATOLOGIC/LYMPHATIC NEGATIVE: 0
CONSTITUTIONAL NEGATIVE: 0
PSYCHIATRIC NEGATIVE: 0
GASTROINTESTINAL NEGATIVE: 0
RESPIRATORY NEGATIVE: 0
CARDIOVASCULAR NEGATIVE: 0
NEUROLOGICAL NEGATIVE: 0

## 2024-08-15 ASSESSMENT — CONF VISUAL FIELD
OS_INFERIOR_TEMPORAL_RESTRICTION: 0
OS_SUPERIOR_TEMPORAL_RESTRICTION: 0
OD_SUPERIOR_NASAL_RESTRICTION: 0
OD_SUPERIOR_TEMPORAL_RESTRICTION: 0
OS_NORMAL: 1
OS_SUPERIOR_NASAL_RESTRICTION: 0
OD_NORMAL: 1
OS_INFERIOR_NASAL_RESTRICTION: 0
OD_INFERIOR_NASAL_RESTRICTION: 0
METHOD: COUNTING FINGERS
OD_INFERIOR_TEMPORAL_RESTRICTION: 0

## 2024-08-15 ASSESSMENT — SLIT LAMP EXAM - LIDS
COMMENTS: GOOD POSITION
COMMENTS: GOOD POSITION

## 2024-08-15 ASSESSMENT — EXTERNAL EXAM - RIGHT EYE: OD_EXAM: NORMAL

## 2024-08-15 ASSESSMENT — VISUAL ACUITY
OS_CC+: -1
OS_CC: 20/20
CORRECTION_TYPE: GLASSES
METHOD: SNELLEN - LINEAR
OD_CC: 20/25

## 2024-08-15 ASSESSMENT — EXTERNAL EXAM - LEFT EYE: OS_EXAM: NORMAL

## 2024-08-15 ASSESSMENT — PACHYMETRY
OS_CT(UM): 567
OD_CT(UM): 572

## 2024-08-15 ASSESSMENT — TONOMETRY
OD_IOP_MMHG: 12
IOP_METHOD: GOLDMANN APPLANATION
OS_IOP_MMHG: 12

## 2024-08-15 NOTE — PROGRESS NOTES
Visual Acuity (Snellen - Linear)         Right Left    Dist cc 20/25 20/20 -1      Correction: Glasses          Tonometry       Tonometry (Goldmann Applanation, 8:30 AM)         Right Left    Pressure 12 12                  Assessment/Plan   Last dilated:  8/15/24  Pt diagnosed with Stage 4 lung CA since Nov 2019 - doing well (just on Ketruda now)    1.  Early Primary Open-Angle Glaucoma OU:  /570.  IOP has some fluctuation, but there has been years of stable VFs and no evidence of progression.  Pt more comfortable OD off the zioptan.  s/p CE+IOL+Hydrus OD 6/7/22 (preop VA 20/60, IOP 12 mmHg).    s/p CE+IOL+Hydrus OS 7/20/22:  pre-op VA 20/30 glare to 20/50 and IOP 13 mmhg.      IOPs are well controlled without medication (previously tolerated zioptan)      Plan:  cont no Rx                f/u 4 months     2.  Pseudophakia (PCIOL) OU:  very early PCO forming, but would not advise YAG cap as she is minimally symptomatic      Plan:  as above    3.  h/o narrow angles OU:  s/p LPI OS 4/17/21 and OD 6/25/21.  patent LPIs with deepening of angles      Plan:  monitor

## 2024-08-16 NOTE — PROGRESS NOTES
Patient ID: Herlinda Springer is a 78 y.o. female.    Diagnosis:   Problem List Items Addressed This Visit       CLL (chronic lymphocytic leukemia) (Multi) - Primary    Relevant Orders    Clinic Appointment Request ISABEL MONET    CBC and Auto Differential    Comprehensive Metabolic Panel    Lactate Dehydrogenase    Uric Acid     Other Visit Diagnoses       Non-Hodgkin's lymphoma, unspecified body region, unspecified non-Hodgkin lymphoma type (Multi)        Relevant Orders    Clinic Appointment Request ISABEL MONET    CBC and Auto Differential    Comprehensive Metabolic Panel    Lactate Dehydrogenase    Uric Acid             Treatment:   Oncology History Overview Note   Lymphocytosis 11/2023 CLL vs marginal zone lymphoma    Flow showing a CD5 positive, CD23 positive lymphoproliferative disorder, strong expression of CD20 and Gy176w and surface light chain atypical for CLL and MZL vs LPL was favored, who was referred by thoracic oncologist Dr Alva for workup of lymphocytosis. Patient has had very mild leukocytosis 12K since 3/2023, however increase to 22.3 in November with preserved hgb and platelets..     Additional database:     SPEP faint IgM kappa and free lambd lyte chsins too low to quantitate, B2 2.9,  ( nl)  IgH heavy chain, not mutated  NGS results:   DISEASE ASSOCIATED GENOMIC FINDINGS:   AUSTIN p.* ([id number] c.[id number]>T)  TP53 p.N239D ([id number] c.715A>G)  TP53 p.U124Mnm*21 ([id number]_306delinsA)     CT scans to followup on her lung cancer 2/26/24 shows no splenomegally and no lymphadenopathy     CLL (chronic lymphocytic leukemia) (Multi)   3/6/2024 Initial Diagnosis    CLL (chronic lymphocytic leukemia) (CMS/HCC)         Past Medical History:    Has been treated for high cholesterol     Past Medical History:   Diagnosis Date    Acute maxillary sinusitis, unspecified 09/04/2019    Acute non-recurrent maxillary sinusitis    Acute upper respiratory infection, unspecified 01/16/2015     Acute URI    Acute upper respiratory infection, unspecified 02/15/2018    Acute URI    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    Benign neoplasm of pituitary gland (Multi) 02/15/2018    Microprolactinoma    Encounter for other preprocedural examination     Preoperative clearance    Episodic paroxysmal hemicrania, 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    Paroxysmal hemicrania    Essential (primary) hypertension 02/15/2018    White coat syndrome with hypertension    Laceration without foreign body of scalp, initial encounter 01/15/2014    Laceration of scalp    Lobar pneumonia, unspecified organism ([facility]) 09/18/2019    Lobar pneumonia    Melanocytic nevi, unspecified 02/15/2018    Benign mole    Migraine with aura, not intractable, without status migrainosus 02/15/2018    Classic migraine with aura    Other conditions influencing health status 02/15/2018    History of cough    Other conditions influencing health status 11/21/2014    History of cough    Other conditions influencing health status 10/12/2015    No significant past surgical history    Other specified disorders of bone density and structure, unspecified site 02/15/2018    Osteopenia    Pain in left ankle and joints of left foot     Chronic pain of left ankle    Pain in left ankle and joints of left foot 05/11/2017    Acute left ankle pain    Pain in left knee     Acute pain of left knee    Pain in left knee 05/11/2017    Acute pain of left knee    Pain in left knee 02/15/2018    Acute pain of left knee    Personal history of other diseases of the musculoskeletal system and connective tissue 06/18/2015    History of muscle pain    Personal history of other diseases of the respiratory system 11/17/2014    History of acute sinusitis    Personal history of other diseases of the respiratory system 06/06/2018    History of acute bronchitis    Personal history of other diseases of the respiratory system 02/15/2018    History of acute  sinusitis    Personal history of other endocrine, nutritional and metabolic disease 2016    History of vitamin D deficiency    Personal history of other specified conditions 2019    History of chronic cough    Personal history of other specified conditions 02/15/2018    History of shortness of breath    Personal history of other specified conditions 2016    History of shortness of breath    Preglaucoma, unspecified, bilateral 10/02/2015    Glaucoma suspect, both eyes    Preglaucoma, unspecified, unspecified eye     Glaucoma suspect    Primary open-angle glaucoma, left eye, severe stage 10/26/2016    Primary open angle glaucoma of left eye, severe stage    Primary open-angle glaucoma, right eye, moderate stage 2022    Primary open angle glaucoma of right eye, moderate stage    Rupture of popliteal cyst 2015    Ruptured Bakers cyst    Rupture of popliteal cyst 02/15/2018    Ruptured Bakers cyst    Unspecified asthma with (acute) exacerbation ([facility]) 2019    Acute asthma exacerbation    Unspecified asthma with (acute) exacerbation ([facility]) 2018    Acute asthma exacerbation    Unspecified blepharitis left eye, unspecified eyelid 10/12/2015    Blepharitis of left eye    Unspecified glaucoma 02/15/2018    Glaucoma       Surgical History:     Past Surgical History:   Procedure Laterality Date    OTHER SURGICAL HISTORY  10/12/2015    Anal Fissurectomy    TONSILLECTOMY  2014    Tonsillectomy        Family History:   No cancers in family, one sister age 90, sister  in  after falling.   Family History   Problem Relation Name Age of Onset    Migraines Mother      Glaucoma Father          suspect       Social History:  Retired , worked as  at  and then at [employer], has been in [place] for 40 years,  for 45 yrs to second . no children. Light Former smoker as a younger adult, born in [place] emig[place] in .   Social History      Tobacco Use    Smoking status: Former     Types: Cigarettes   Vaping Use    Vaping status: Never Used   Substance Use Topics    Alcohol use: Never    Drug use: Never      -------------------------------------------------------------------------------------------------------  Subjective       Ms Springer is a 79 yo female h/o stage IV NSCLC (RUL mass, LT adrenal mets, mediastinal and cervical LN) s/p carbo/pem/pem (carbo given in 2021, pembro finished 8/2023) and RT now with stable disease (on surveillance), lymphocytosis with flow showing a CD5 positive, CD23 positive lymphoproliferative disorder,  strong expression of CD20 and Nn718t and surface light chain atypical for CLL and  MZL vs LPL was favored, who was referred by thoracic oncologist Dr Alva for workup of lymphocytosis. Patient has had very mild leukocytosis 12K since 3/2023, however increase to 22.3 in November prompted further evaluation. She has preserved hgb and platelets.     Additional database:     SPEP faint IgM kappa and free lambd lyte chsins too low to quantitate, B2 2.9,  ( nl)  IgH heavy chain, not mutated  NGS results:   DISEASE ASSOCIATED GENOMIC FINDINGS:   AUSTIN p.* (NM_000051 c.3574A>T)  TP53 p.N239D (NM_000546 c.715A>G)  TP53 p.A471Dwq*21 ([id number] c.[id number])   CT scans to followup on her lung cancer 2/26/24 shows no splenomegally and no lymphadenopathy    6/4/24:  CBC 5/22/24 shows wbc of 48.4 up from 27.6 2/22/24  hgb 11.5 and platlets 193,000. CT imaging 5/24/24 showed no concern for progressive lung cancer or lymphoma although mild splenomegally of 14.8 cm increased from 12.7 cm on last scan.  Denies fevers, sweats, difficulty with eating. Had a pipe burst in the winter which caused some stress.    8/19/24: Presenting to clinic today for follow up with . Doing well and clinically asymptomatic. No fevers, night sweats, weight loss. Normal appetite. No abdominal pain, normal bowel movements. No bleeding or  bruising. No other concerns. Had labs done a week prior to visit today - WBC increased to 70.8 (doubled over 6 months), Hb 11.0 (decreased by 1.7g/dl over 6 months), platelets 176 (stable).     Review of Systems   All other systems reviewed and are negative.     -------------------------------------------------------------------------------------------------------  Objective   BSA: 1.6 meters squared  /62   Pulse 96   Temp 36.5 °C (97.7 °F)   Resp 16   Wt 56.4 kg (124 lb 5.4 oz)   SpO2 100%   BMI 21.33 kg/m²     Physical Exam  Constitutional:       Appearance: Normal appearance.      Comments: Fit and well looks a decade younger than stated age   HENT:      Head: Normocephalic and atraumatic.      Nose: Nose normal.   Eyes:      Extraocular Movements: Extraocular movements intact.      Conjunctiva/sclera: Conjunctivae normal.      Pupils: Pupils are equal, round, and reactive to light.   Cardiovascular:      Rate and Rhythm: Normal rate and regular rhythm.   Pulmonary:      Breath sounds: Normal breath sounds.   Chest:   Breasts:     Right: Normal.      Left: Normal.   Abdominal:      General: Abdomen is flat. Bowel sounds are normal.      Palpations: Abdomen is soft. There is mass (spleen 2-3 cm BCM).   Musculoskeletal:         General: Normal range of motion.   Skin:     General: Skin is warm and dry.   Neurological:      General: No focal deficit present.      Mental Status: She is oriented to person, place, and time.   Psychiatric:         Mood and Affect: Mood normal.         Behavior: Behavior normal.         Thought Content: Thought content normal.         Performance Status:  Asymptomatic  -------------------------------------------------------------------------------------------------------  Assessment/Plan      Ms Springer is a 77 yo female h/o stage CLIFTON NSCLC (RUL mass, LT adrenal mets, mediastinal and cervical LN) s/p carbo/pem/pem (carbo given in 2021, pembro finished 8/2023) and RT (lung mass  and adrenal mass) now with stable disease (on surveillance), new onset CD5, CD23 lymphocytosis with flow c/f MZL vs LPL, HTN, hypothyroidism, asthma, who was referred by thoracic oncologist Dr Alva for workup of lymphocytosis.     1 Atypical CLL    Lymphocytosis first noticed in 3/2023 at 12k, but increased to 22.3 in November 2023.  She has preserved hgb and platelets  and is assymptomatic. ALC doubled in 2023. New borderline splenomegaly on CT in 10/2023.  PB flow showed CD5+ clonal ppl, atypical for CLL, mainly concern for MZL vs LPL.  Additional database notable for TP 53 mutations, absence of MYD88 goes against LPL, so I suspect this is likely an atypical CLL.     WBC   Date Value Ref Range Status   08/13/2024 70.8 (HH) 4.4 - 11.3 x10*3/uL Final   06/04/2024 49.5 (H) 4.4 - 11.3 x10*3/uL Final   05/22/2024 48.4 (H) 4.4 - 11.3 x10*3/uL Final   02/22/2024 27.6 (H) 4.4 - 11.3 x10*3/uL Final   02/12/2024 35.9 (H) 4.4 - 11.3 x10*3/uL Final     Hemoglobin   Date Value Ref Range Status   08/13/2024 11.0 (L) 12.0 - 16.0 g/dL Final   06/04/2024 11.7 (L) 12.0 - 16.0 g/dL Final   05/22/2024 11.5 (L) 12.0 - 16.0 g/dL Final   02/22/2024 11.7 (L) 12.0 - 16.0 g/dL Final   02/12/2024 12.7 12.0 - 16.0 g/dL Final     6/3/24 Wbc slowly increasing with generally preserved hgb and platelets so no indications for treatment at present, spleen has increased to 14.7 cm from normal as of most recent CT imaging on 5/24/24.  No indications to start therapy.  Would favor zanubrutinib when treatment is needed, briefly reviewed goals, scheduling and side effects.     8/19/24: WBC increased to 70.8 and Hb down to 11.0. Clinically asymptomatic. Spleen maybe bigger than from previous exam, planned for next imaging Nov 4, 2024. Discussed with patient that given the overall picture, we will favor continuing observation versus starting treatment with BTK inhibitors. She had very good questions on other treatment options, and we discussed that  first line treatment which has shown best tolerance would be zanubrutinib. We also discussed possible interactions with herbal supplements which were recommended to her by her integrative medicine doctor, and she agrees to hold off on them given very little data on its benefit and possible interactions with BTK inhibitors.     According to IPSS score for asymptomatic CLL, she has high risk features in the form of IGHV unmutated and TP53x2 mutation, lymphocytosis>15,000, hence we recommend close monitoring of lab work in the meanwhile. Patient and her  in agreement.     Plan:  Atypical CLL  - follow up in 2 months for labs and exam  - CT scan scheduled per lung cancer doctors on 11/4/2024  - preference treatment when we start would be zanubrutinib +/- rituximab.   - avoid supplements such as Ashwagandha which may interact with zanubrutinib (not great data)    2. NSCLC adenocarcinoma  - in remission, managed by thoracic oncologists  - scans in Nov 2024      RTC: 2 month for provider visit and labs.         -------------------------------------------------------------------------------------------------------    Patient seen and discussed with Dr Sal Barrientos MD  Fellow  Bone Marrow Transplant  [facility]

## 2024-08-19 ENCOUNTER — OFFICE VISIT (OUTPATIENT)
Dept: HEMATOLOGY/ONCOLOGY | Facility: HOSPITAL | Age: 79
End: 2024-08-19
Payer: MEDICARE

## 2024-08-19 VITALS
SYSTOLIC BLOOD PRESSURE: 133 MMHG | BODY MASS INDEX: 21.33 KG/M2 | OXYGEN SATURATION: 100 % | RESPIRATION RATE: 16 BRPM | WEIGHT: 124.34 LBS | DIASTOLIC BLOOD PRESSURE: 62 MMHG | HEART RATE: 96 BPM | TEMPERATURE: 97.7 F

## 2024-08-19 DIAGNOSIS — C85.90 NON-HODGKIN'S LYMPHOMA, UNSPECIFIED BODY REGION, UNSPECIFIED NON-HODGKIN LYMPHOMA TYPE (MULTI): ICD-10-CM

## 2024-08-19 DIAGNOSIS — C91.10 CLL (CHRONIC LYMPHOCYTIC LEUKEMIA) (MULTI): Primary | ICD-10-CM

## 2024-08-19 PROCEDURE — 1159F MED LIST DOCD IN RCRD: CPT | Performed by: INTERNAL MEDICINE

## 2024-08-19 PROCEDURE — 3078F DIAST BP <80 MM HG: CPT | Performed by: INTERNAL MEDICINE

## 2024-08-19 PROCEDURE — 99214 OFFICE O/P EST MOD 30 MIN: CPT | Performed by: INTERNAL MEDICINE

## 2024-08-19 PROCEDURE — 1126F AMNT PAIN NOTED NONE PRSNT: CPT | Performed by: INTERNAL MEDICINE

## 2024-08-19 PROCEDURE — 3075F SYST BP GE 130 - 139MM HG: CPT | Performed by: INTERNAL MEDICINE

## 2024-08-19 ASSESSMENT — PAIN SCALES - GENERAL: PAINLEVEL: 0-NO PAIN

## 2024-08-26 ENCOUNTER — TELEPHONE (OUTPATIENT)
Dept: HEMATOLOGY/ONCOLOGY | Facility: HOSPITAL | Age: 79
End: 2024-08-26
Payer: MEDICARE

## 2024-08-26 DIAGNOSIS — F41.9 ANXIETY: ICD-10-CM

## 2024-08-26 RX ORDER — ALPRAZOLAM 0.5 MG/1
0.5 TABLET ORAL 3 TIMES DAILY PRN
Qty: 90 TABLET | Refills: 0 | Status: SHIPPED | OUTPATIENT
Start: 2024-08-26 | End: 2024-09-25

## 2024-08-26 NOTE — TELEPHONE ENCOUNTER
Refill request received for Alprazolam 0.5mg.  Preferred pharmacy is [facility] at [address] in [place].  Message sent to team to refill if appropriate.

## 2024-08-28 ENCOUNTER — APPOINTMENT (OUTPATIENT)
Dept: INTEGRATIVE MEDICINE | Facility: CLINIC | Age: 79
End: 2024-08-28
Payer: MEDICARE

## 2024-08-28 DIAGNOSIS — M79.10 MYALGIA: ICD-10-CM

## 2024-08-28 DIAGNOSIS — C34.90 PRIMARY MALIGNANT NEOPLASM OF LUNG METASTATIC TO OTHER SITE, UNSPECIFIED LATERALITY (MULTI): Primary | ICD-10-CM

## 2024-08-28 PROCEDURE — 97140 MANUAL THERAPY 1/> REGIONS: CPT | Performed by: HOSPITALIST

## 2024-08-28 ASSESSMENT — PAIN SCALES - GENERAL: PAINLEVEL_OUTOF10: 0 - NO PAIN

## 2024-08-28 NOTE — PROGRESS NOTES
Massage Therapy Visit:     Herlinda Springer was referred by Dr. Hernandez.    Condition of Client Subjective :  Patient ID: Herlinda Springer is a 78 y.o. female who presents for a 50 minute Ugo Therapy.  This patient has not had many ugo treatments.  I explained that this would be a gentle treatment.  I checked in with her often.  Patient stated that she had been treated for lung caner.  She stated that she has Chronic Lymphocytic Leukemia.  I did a full body treatment.  Patient stated after the treatment she notice an improvement with her back discomfort.  She was able to relax.       Session Information  Visit Type: New patient  Description of present complaint: Discomfort, Muscle tension    Objective   Pre-treatment Assessment  Arrival Mode: Ambulatory  Pain Score: 1  Anxiety Level (0-10): 5  Stress Level (0-10): 3  Coping Level (0-10): 7  Depression Level (0-10): 1  Fatigue Level (0-10): 0  Nausea Level (0-10): 0  Wellbeing Level (0-10): 3        Actions Assessment/Plan :  Provider reviewed plan for the massage session, precautions and contraindications. Patient/guardian/hospital staff has given consent to treat with full understanding of what to expect during the session. Before massage therapy began, provider explained to the patient to communicate at any time if the pressure was causing discomfort past their tolerance level. Patient agreed to advise therapist.    Massage Treatment  Patient Position: Table  Positioning Assistance: Did not need assistance  Massage Technique: Relaxation massage  Pressure Scale: 1 - Light pressure, 2 - Mild pressure    Response:  Post-treatment Assessment  Patient Noted Improvement of the Following Symptoms: Muscle tension  0-10 (Numeric) Pain Score: 0 - No pain  Anxiety Level (0-10): 1  Stress Level (0-10): 1  Coping Level (0-10): 8  Depression Level (0-10): 1  Fatigue Level (0-10): 0  Nausea Level (0-10): 0  Wellbeing Level (0-10): 2    Evaluation:    Patient will follow up as  needed.

## 2024-09-04 ENCOUNTER — APPOINTMENT (OUTPATIENT)
Dept: INTEGRATIVE MEDICINE | Facility: CLINIC | Age: 79
End: 2024-09-04

## 2024-09-04 DIAGNOSIS — C34.90 PRIMARY MALIGNANT NEOPLASM OF LUNG METASTATIC TO OTHER SITE, UNSPECIFIED LATERALITY (MULTI): Primary | ICD-10-CM

## 2024-09-04 DIAGNOSIS — M79.10 MYALGIA: ICD-10-CM

## 2024-09-04 PROCEDURE — MASS(CHAIR) MASSAGE (CHAIR): Performed by: HOSPITALIST

## 2024-09-04 NOTE — PROGRESS NOTES
Integrative Oncology Symptom Management Clinic: Reiki: Initial Visit/Visit 1    Session Start   Date: 09.04.2024  Time: 0945    Session End   Date: 09.04.2024  Time: 1020    Assessment of patient:  Identified patient via full name and date of birth  Arrived ambulatory, steady gait, no assistive device   She told her story:   Diagnosed with CLL/past history of lung cancer in 2019  She feels her WBCs are fighting and do not know when to stop  She is being mindful and saying affirmations to her body to be in homeostasis and to relax  She listens to podcasts, reads to Caitlyn Hay books  Focuses on mind over matter    WellbeingPre 9  CopingPre 9  PainPre 0  TirednessPre 3  AnxietyPre 9  DepressionPre 5  StressPre 9  NauseaPre 0    Session goals:   Promote wellbeing, comfort, promote relief of fatigue/tiredness, anxiety and stress, depression    Patient Intention:   WBCs in homeostasis    Provider Intention:   Patient's highest and greatest healing and wellbeing     Reiki procedure    Hand positions:   Supine Only:   Hands On: head: frontal, temporal, occiput, posterior neck, anterior and posterior shoulders, elbows, hands, mid-back, lower back, lateral hips, knees, ankles, dorsal and planter surfaces of feet  Hands Off/Hovering: anterior chest abdomen, pelvic region    Duration of session (minutes): 35    Hands on or hands off  [] hands on only  [] hands off only  [x] both     Were you interrupted during the session? No    Was music used during the session? Yes      What music recording was used?   [employer]: Spa Machine    Did the patient talk with you during the Reiki session? No    Did the patient fall asleep during the Reiki session? No    Do you have any comments about the session, such as the patient's reaction, or your experience during the Reiki?   Patient rested with eyes closed, relaxed facial and body posture, and deep, even breathing    Do you feel that the patient benefited from the treatment today? Yes  Noted  she was relaxed  She noted that she felt the relaxed over her whole body, especially when provider's hands were over the left arm and left lower extremity (ankle)  She noted she suffers from anxiety and Reiki promoted relaxation  Fatigue/Tiredness, Anxiety, Stress, Depression ratings decreased    WellbeingPost 9   CopingPost 9   PainPost 0  TirednessPost 0  AnxietyPost 3  DepressionPost 3  StressPost 3  NauseaPost 0    Integrative Oncology Symptom Management Clinic: Acupuncture: 09.18  Integr[facility] Oncology Sympt[facility]: Massage Therapy: 10.02  Integrative Oncology Symptom Management Clinic Reiki: to be scheduled  Integrative Oncology Consult Dr. Hernandez: 11.13    Note signed by Nyasia Lam LMT on 09/04/24

## 2024-09-18 ENCOUNTER — APPOINTMENT (OUTPATIENT)
Dept: INTEGRATIVE MEDICINE | Facility: CLINIC | Age: 79
End: 2024-09-18
Payer: MEDICARE

## 2024-09-18 DIAGNOSIS — M79.10 MYALGIA: ICD-10-CM

## 2024-09-18 DIAGNOSIS — C34.90 PRIMARY MALIGNANT NEOPLASM OF LUNG METASTATIC TO OTHER SITE, UNSPECIFIED LATERALITY (MULTI): Primary | ICD-10-CM

## 2024-09-18 PROCEDURE — 99214 OFFICE O/P EST MOD 30 MIN: CPT | Performed by: HOSPITALIST

## 2024-09-18 ASSESSMENT — PAIN SCALES - GENERAL: PAINLEVEL_OUTOF10: 2

## 2024-09-18 NOTE — PROGRESS NOTES
Patient ID: Herlinda Springer is a 78 y.o. female.  Referring Physician: No referring provider defined for this encounter.  Primary Care Provider: Alley Bui MD     CANCER HISTORY:   Ms Springer is a 77 yo female h/o stage CLIFTON NSCLC (RUL mass, LT adrenal mets, mediastinal and cervical LN) s/p carbo/pem/pem (carbo given in 2021, pembro finished 8/2023) and RT (lung mass and adrenal mass) now with stable disease (on surveillance), new onset CD5, CD23 lymphocytosis with flow c/f MZL vs LPL, HTN, hypothyroidism, asthma, with atypical CLL on observation     SITES OF DISEASE  Right upper lobe  Left adrenal gland   Brain - left parietal lobe      MOLECULAR GENOMICS  TP53 mutant   EGFR negative  ALK-1 negative   ROS1 negative  BRAF V600E negative  NTRK negative      PD-L1 TPS < 1%     PRIOR THERAPIES  Cycle # 1 carboplatin/pemetrexed/pembrolizumab - 01/15/20, cycle # 4 on 03/18/20  Starts maintenance pemetrexed/pembrolizumab on 04/08/20  Pemetrexed discontinued on 03/01/2023 due to decline GFR'   Last pembrolizumab dose on 08/02/2023     CURRENT THERAPY  Observation      INTEGRATIVE HISTORY:  Symptoms:  Migraines     Anxiety - meditating, doing yoga/roland/Saleem Chi at [place]              Xanex prn     Diet: h/o diverticulosis  Would check leaky gut  Overall good     PA: walking everyday    Sleep: sleeping well - Zquil     Stress: managed with alprazolam              Qi gong/saleem chi, yoga, meditation, REIKI  Does Self-REIKI, listens to [employer] 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  There were no vitals taken for this visit.     PHYSICAL EXAM:  NAD, awake/alert  HEENT, NCAT, OP clear, no oral lesions  CTA bilat  RRR no mgr  Abd soft/nt/nd+bs  No  c/c/e/ttp  Motor/sensory intact, CN 2-12 intact      RESULTS:        Lab Results     Integrative Labs:     Functional Tests:           Assessment/Plan  Metastatic lung cancer on observation s/p carbo/alimta/pembro them pembrolizumab and radiation mult times now on observation, see history  Now with atypical CLL     CANCER SPECIFIC RECCS:  Biomat or infrared sauna  VIOME testing - full body intelligence  Consider [facility] Functional Medicine     LIFESTYLE:  Diet - 5-9 fruits/veg/day (7 veg to 2 fruits)  Limit sugar  Cruciferous Vegetables - Brussel Sprouts, Kale, Broccoli, Cauliflower  Plant based anti-inflammatory whole foods diet  AICR New American Plate  PHYSICAL ACTIVITY 30 MIN/DAY     Reading:  An[employer]ancer Living  Cancer Fighting Kitchen     Websites:  Cook for your Life  [email or web address] In Store     SYMPTOM MANAGEMENT:  Anxiety: using alprazolam and working for reducing pain etc.   Consider Support Groups  Continue meditation, yoga etc. At [facility]     Integrative Modalities to consider:  Massage Therapy - pain/anxiety  Acupuncture - for pain/anxiety  Reiki     Deep breathing: Alternate nostril breathing and Deep abdominal breathing (5 min) in the morning  Yoga  walking, gardening is great  Focusing gratitude  [facility] - Guided Meditation     Migraines - acupuncture     Natural Products to Consider:  Vitamin D3/K2  Turmeric (Dionne) - taking  Ashwaghanda (Dionne) or PROZE NODZZ  Essential oils: Consider Lavender in a diffuser     Follow up: IO 4 months  [facility] Sympt[facility]    SYMPTOM MANAGEMENT:  Integrative Oncology Symptom Management:    The [facility] Integrative Oncology Symptom Management clinic offers multi-disciplinary supervised care of cancer patients using Integrative Modalities billed to insurance using NCCN and SIO/ASCO guideline-driven practices.  ESAS is obtained prior to and after each treatment by the practitioner    Symptoms Managed:  Migraines - doing well, ocular  migraine    Anxiety - pretty much the same, taking xanex and meditating  Doing Reiki    Natural Products utilized:      Integrative Treatment: Acupuncture  Session #: 1  Frequency: weekly    Referrals:   Recommendations:    Follow Up:  Symptom Management: weekly  Integrative Oncology:     I have personally seen the patient and supervised the treatment by the integrative practitioner during this visit.  Pt had symptoms discussed and I was present for the patient's 60 minutes of direct patient care.

## 2024-09-18 NOTE — PROGRESS NOTES
Acupuncture Visit:     Subjective   Patient ID: Herlinda Springer is a 78 y.o. female who presents for No chief complaint on file.  Pt reported that she is doing well.  She reports no long lasting side effects from treatment.  She utilizes reiki and qigong  and self care to promote health and wellbeing.  She is challenged by anxiety and is currently on medication to mitigate.  When it manifests she feels tight painful in her upper abdomen.  She uses self massage to support.               Pre-treatment Assessment  Pain Score: 6  Anxiety Level (0-10): 6  Stress Level (0-10): 6  Coping Level (0-10): 6  Depression Level (0-10): 3  Fatigue Level (0-10): 2  Nausea Level (0-10): 1  Wellbeing Level (0-10): 7    Review of Systems         Provider reviewed plan for the acupuncture session, precautions and contraindications. Patient/guardian/hospital staff has given consent to treat with full understanding of what to expect during the session. Before acupuncture began, provider explained to the patient to communicate at any time if the procedure was causing discomfort past their tolerance level. Patient agreed to advise acupuncturist. The acupuncturist counseled the patient on the risks of acupuncture treatment including pain, infection, bleeding, and no relief of pain. The patient was positioned comfortably. There was no evidence of infection at the site of needle insertions.    Objective   Physical Exam         Treatment Plan  Pattern Differentiation: adrenal  and sugar imbalance, oketsu, immune imbalance  Treatment Principle: move qi and blood, regulate immune and sugar    Acupuncture Treatment  Patient Position: Supine on a table  Needle Guage: 40 guage /.16/ Red seirin, 34 guage /.22/ Pink seirin  Body Points - Left: kimberlee 5, lr 4,  Body Points - Bilateral: immune, st qix1, sp 3.2, k 7, 10, 27  Body Points - Right: st 24  Needle Count In: 15  Needle Count Out: 15  Needle Retention Time (min): 30 minutes  Total Face to Face Time  (min): 25 minutes         Post-treatment Assessment  0-10 (Numeric) Pain Score: 2  Anxiety Level (0-10): 2  Stress Level (0-10): 2  Coping Level (0-10): 8  Depression Level (0-10): 2  Fatigue Level (0-10): 2  Nausea Level (0-10): 0  Wellbeing Level (0-10): 8    Assessment/Plan

## 2024-09-19 DIAGNOSIS — J31.0 CHRONIC RHINITIS: ICD-10-CM

## 2024-09-19 RX ORDER — MONTELUKAST SODIUM 10 MG/1
10 TABLET ORAL EVERY EVENING
Qty: 90 TABLET | Refills: 1 | Status: SHIPPED | OUTPATIENT
Start: 2024-09-19

## 2024-09-24 ENCOUNTER — APPOINTMENT (OUTPATIENT)
Dept: INTEGRATIVE MEDICINE | Facility: CLINIC | Age: 79
End: 2024-09-24
Payer: MEDICARE

## 2024-09-24 DIAGNOSIS — G89.29 CHRONIC PRIMARY LOW BACK PAIN: Primary | ICD-10-CM

## 2024-09-24 DIAGNOSIS — M54.59 CHRONIC PRIMARY LOW BACK PAIN: Primary | ICD-10-CM

## 2024-09-24 PROCEDURE — 97810 ACUP 1/> WO ESTIM 1ST 15 MIN: CPT | Performed by: HOSPITALIST

## 2024-09-24 PROCEDURE — 97811 ACUP 1/> W/O ESTIM EA ADD 15: CPT | Performed by: HOSPITALIST

## 2024-09-24 NOTE — PROGRESS NOTES
Acupuncture Visit:     Subjective   Patient ID: Herlinda Springer is a 78 y.o. female who presents for No chief complaint on file.  Has L5/S1 stenosis  pain midline low back   4/10  Occ radiating pain to buttocks and across low back    Anxiety- daily  Change sin BM- senacott and metamucil  Sleep- zquill,   Energy OK-        Previous  Pt reported that she is doing well.  She reports no long lasting side effects from treatment.  She utilizes reiki and qigong  and self care to promote health and wellbeing.  She is challenged by anxiety and is currently on medication to mitigate.  When it manifests she feels tight painful in her upper abdomen.  She uses self massage to support.          Session Information  Is this acupuncture treatment being billed to the patient's insurance company: Yes  This is visit number: 1  The patient has a total number of visits of: 12  Initial Acupuncture Treatment date: 09/24/24  Last Treatment date: 12/23/24  Name of Supervising Physician: David  Visit Type: Follow-up visit  Medical History Reviewed: I have reviewed pertinent medical history in EHR, and no contraindications are present to provide treatment         Review of Systems         Provider reviewed plan for the acupuncture session, precautions and contraindications. Patient/guardian/hospital staff has given consent to treat with full understanding of what to expect during the session. Before acupuncture began, provider explained to the patient to communicate at any time if the procedure was causing discomfort past their tolerance level. Patient agreed to advise acupuncturist. The acupuncturist counseled the patient on the risks of acupuncture treatment including pain, infection, bleeding, and no relief of pain. The patient was positioned comfortably. There was no evidence of infection at the site of needle insertions.    Objective   Physical Exam         Treatment Plan  Treatment Goals: Pain management                   Assessment/Plan    Diagnoses and all orders for this visit:  Chronic primary low back pain (Primary)

## 2024-10-01 ENCOUNTER — TELEPHONE (OUTPATIENT)
Dept: HEMATOLOGY/ONCOLOGY | Facility: HOSPITAL | Age: 79
End: 2024-10-01
Payer: MEDICARE

## 2024-10-01 ENCOUNTER — LAB (OUTPATIENT)
Dept: LAB | Facility: HOSPITAL | Age: 79
End: 2024-10-01
Payer: MEDICARE

## 2024-10-01 DIAGNOSIS — C91.10 CLL (CHRONIC LYMPHOCYTIC LEUKEMIA) (MULTI): ICD-10-CM

## 2024-10-01 DIAGNOSIS — F41.9 ANXIETY: ICD-10-CM

## 2024-10-01 DIAGNOSIS — C85.90 NON-HODGKIN'S LYMPHOMA, UNSPECIFIED BODY REGION, UNSPECIFIED NON-HODGKIN LYMPHOMA TYPE: ICD-10-CM

## 2024-10-01 LAB
ALBUMIN SERPL BCP-MCNC: 4 G/DL (ref 3.4–5)
ALP SERPL-CCNC: 98 U/L (ref 33–136)
ALT SERPL W P-5'-P-CCNC: 9 U/L (ref 7–45)
ANION GAP SERPL CALC-SCNC: 14 MMOL/L (ref 10–20)
AST SERPL W P-5'-P-CCNC: 16 U/L (ref 9–39)
BASOPHILS # BLD MANUAL: 0 X10*3/UL (ref 0–0.1)
BASOPHILS NFR BLD MANUAL: 0 %
BILIRUB SERPL-MCNC: 0.5 MG/DL (ref 0–1.2)
BUN SERPL-MCNC: 18 MG/DL (ref 6–23)
CALCIUM SERPL-MCNC: 9.1 MG/DL (ref 8.6–10.3)
CHLORIDE SERPL-SCNC: 105 MMOL/L (ref 98–107)
CO2 SERPL-SCNC: 26 MMOL/L (ref 21–32)
CREAT SERPL-MCNC: 0.92 MG/DL (ref 0.5–1.05)
EGFRCR SERPLBLD CKD-EPI 2021: 63 ML/MIN/1.73M*2
EOSINOPHIL # BLD MANUAL: 1 X10*3/UL (ref 0–0.4)
EOSINOPHIL NFR BLD MANUAL: 1 %
ERYTHROCYTE [DISTWIDTH] IN BLOOD BY AUTOMATED COUNT: 13.7 % (ref 11.5–14.5)
GLUCOSE SERPL-MCNC: 82 MG/DL (ref 74–99)
HCT VFR BLD AUTO: 32.6 % (ref 36–46)
HGB BLD-MCNC: 10 G/DL (ref 12–16)
IMM GRANULOCYTES # BLD AUTO: 0.3 X10*3/UL (ref 0–0.5)
IMM GRANULOCYTES NFR BLD AUTO: 0.3 % (ref 0–0.9)
LDH SERPL L TO P-CCNC: 178 U/L (ref 84–246)
LYMPHOCYTES # BLD MANUAL: 70.72 X10*3/UL (ref 0.8–3)
LYMPHOCYTES NFR BLD MANUAL: 71 %
MCH RBC QN AUTO: 27.3 PG (ref 26–34)
MCHC RBC AUTO-ENTMCNC: 30.7 G/DL (ref 32–36)
MCV RBC AUTO: 89 FL (ref 80–100)
MONOCYTES # BLD MANUAL: 14.94 X10*3/UL (ref 0.05–0.8)
MONOCYTES NFR BLD MANUAL: 15 %
NEUTROPHILS # BLD MANUAL: 8.97 X10*3/UL (ref 1.6–5.5)
NEUTS BAND # BLD MANUAL: 1 X10*3/UL (ref 0–0.5)
NEUTS BAND NFR BLD MANUAL: 1 %
NEUTS SEG # BLD MANUAL: 7.97 X10*3/UL (ref 1.6–5)
NEUTS SEG NFR BLD MANUAL: 8 %
NRBC BLD-RTO: 0 /100 WBCS (ref 0–0)
PLATELET # BLD AUTO: 122 X10*3/UL (ref 150–450)
PLATELET CLUMP BLD QL SMEAR: PRESENT
POTASSIUM SERPL-SCNC: 3.9 MMOL/L (ref 3.5–5.3)
PROT SERPL-MCNC: 6.6 G/DL (ref 6.4–8.2)
RBC # BLD AUTO: 3.66 X10*6/UL (ref 4–5.2)
RBC MORPH BLD: ABNORMAL
SODIUM SERPL-SCNC: 141 MMOL/L (ref 136–145)
TOTAL CELLS COUNTED BLD: 100
URATE SERPL-MCNC: 6.7 MG/DL (ref 2.3–6.7)
VARIANT LYMPHS # BLD MANUAL: 3.98 X10*3/UL (ref 0–0.3)
VARIANT LYMPHS NFR BLD: 4 %
WBC # BLD AUTO: 99.6 X10*3/UL (ref 4.4–11.3)

## 2024-10-01 PROCEDURE — 85007 BL SMEAR W/DIFF WBC COUNT: CPT

## 2024-10-01 PROCEDURE — 84550 ASSAY OF BLOOD/URIC ACID: CPT

## 2024-10-01 PROCEDURE — 80053 COMPREHEN METABOLIC PANEL: CPT

## 2024-10-01 PROCEDURE — 85027 COMPLETE CBC AUTOMATED: CPT

## 2024-10-01 PROCEDURE — 83615 LACTATE (LD) (LDH) ENZYME: CPT

## 2024-10-01 PROCEDURE — 36415 COLL VENOUS BLD VENIPUNCTURE: CPT

## 2024-10-01 RX ORDER — ALPRAZOLAM 0.5 MG/1
0.5 TABLET ORAL 3 TIMES DAILY PRN
Qty: 90 TABLET | Refills: 0 | Status: SHIPPED | OUTPATIENT
Start: 2024-10-01 | End: 2024-10-31

## 2024-10-01 NOTE — TELEPHONE ENCOUNTER
Former pt of Dr. Alva, has NPV with Dr. King on 11/6.    Pt requests refill of Alprazolam 0.5mg,    Preferred pharmacy is [facility] at [address] [facility] in [place].    Message sent to team.

## 2024-10-02 ENCOUNTER — APPOINTMENT (OUTPATIENT)
Dept: INTEGRATIVE MEDICINE | Facility: CLINIC | Age: 79
End: 2024-10-02
Payer: MEDICARE

## 2024-10-02 DIAGNOSIS — G89.29 CHRONIC PRIMARY LOW BACK PAIN: Primary | ICD-10-CM

## 2024-10-02 DIAGNOSIS — C34.90 PRIMARY MALIGNANT NEOPLASM OF LUNG METASTATIC TO OTHER SITE, UNSPECIFIED LATERALITY (MULTI): ICD-10-CM

## 2024-10-02 DIAGNOSIS — M54.59 CHRONIC PRIMARY LOW BACK PAIN: Primary | ICD-10-CM

## 2024-10-02 DIAGNOSIS — M79.10 MYALGIA: ICD-10-CM

## 2024-10-02 PROCEDURE — 97140 MANUAL THERAPY 1/> REGIONS: CPT | Performed by: HOSPITALIST

## 2024-10-02 ASSESSMENT — PAIN SCALES - GENERAL: PAINLEVEL_OUTOF10: 2

## 2024-10-02 NOTE — PROGRESS NOTES
Massage Therapy Visit:     Herlinda Springer was referred by Dr. Hernandez.    Condition of Client Subjective :  Patient ID: Herlinda Springer is a 78 y.o. female who presents for a 40 minute Ugo Therapy.  Patient is being treated for lung cancer.  Patient stated that she felt good after her last treatment.  Patient carries her stress In her neck and shoulders.  She stated that she has slight back discomfort.  I checked in with her often.  Patient was able to relax.  She did notice an I improvement with her muscle discomfort.       Session Information  Visit Type: Follow-up visit  Description of present complaint: Discomfort, Muscle tension        Objective   Pre-treatment Assessment  Arrival Mode: Ambulatory  Pain Score: 3  Anxiety Level (0-10): 5  Stress Level (0-10): 6  Coping Level (0-10): 4  Depression Level (0-10): 4  Fatigue Level (0-10): 3  Nausea Level (0-10): 0  Wellbeing Level (0-10): 6        Actions Assessment/Plan :  Provider reviewed plan for the massage session, precautions and contraindications. Patient/guardian/hospital staff has given consent to treat with full understanding of what to expect during the session. Before massage therapy began, provider explained to the patient to communicate at any time if the pressure was causing discomfort past their tolerance level. Patient agreed to advise therapist.    Massage Treatment  Patient Position: Table  Positioning Assistance: Did not need assistance  Massage Technique: Relaxation massage  Pressure Scale: 2 - Mild pressure    Response:  Post-treatment Assessment  Patient Noted Improvement of the Following Symptoms: Muscle tension  0-10 (Numeric) Pain Score: 2  Anxiety Level (0-10): 3  Stress Level (0-10): 2  Coping Level (0-10): 7  Depression Level (0-10): 2  Fatigue Level (0-10): 2  Nausea Level (0-10): 0  Wellbeing Level (0-10): 3    Evaluation:   Patient will follow up as needed.

## 2024-10-07 ENCOUNTER — LAB (OUTPATIENT)
Dept: LAB | Facility: HOSPITAL | Age: 79
End: 2024-10-07
Payer: MEDICARE

## 2024-10-07 ENCOUNTER — OFFICE VISIT (OUTPATIENT)
Dept: HEMATOLOGY/ONCOLOGY | Facility: HOSPITAL | Age: 79
End: 2024-10-07
Payer: MEDICARE

## 2024-10-07 ENCOUNTER — ONCOLOGY MEDICATION OUTREACH (OUTPATIENT)
Dept: HEMATOLOGY/ONCOLOGY | Facility: HOSPITAL | Age: 79
End: 2024-10-07
Payer: MEDICARE

## 2024-10-07 VITALS
SYSTOLIC BLOOD PRESSURE: 117 MMHG | DIASTOLIC BLOOD PRESSURE: 51 MMHG | TEMPERATURE: 97.2 F | BODY MASS INDEX: 21.29 KG/M2 | HEART RATE: 98 BPM | WEIGHT: 124.12 LBS | OXYGEN SATURATION: 100 % | RESPIRATION RATE: 17 BRPM

## 2024-10-07 DIAGNOSIS — C85.90 NON-HODGKIN'S LYMPHOMA, UNSPECIFIED BODY REGION, UNSPECIFIED NON-HODGKIN LYMPHOMA TYPE: ICD-10-CM

## 2024-10-07 DIAGNOSIS — C91.10 CLL (CHRONIC LYMPHOCYTIC LEUKEMIA) (MULTI): ICD-10-CM

## 2024-10-07 LAB
HBV CORE AB SER QL: NONREACTIVE
HBV SURFACE AG SERPL QL IA: NONREACTIVE
HCV AB SER QL: NONREACTIVE
HIV 1+2 AB+HIV1 P24 AG SERPL QL IA: NONREACTIVE

## 2024-10-07 PROCEDURE — 3074F SYST BP LT 130 MM HG: CPT | Performed by: INTERNAL MEDICINE

## 2024-10-07 PROCEDURE — 86704 HEP B CORE ANTIBODY TOTAL: CPT

## 2024-10-07 PROCEDURE — 87340 HEPATITIS B SURFACE AG IA: CPT

## 2024-10-07 PROCEDURE — 86803 HEPATITIS C AB TEST: CPT

## 2024-10-07 PROCEDURE — 3078F DIAST BP <80 MM HG: CPT | Performed by: INTERNAL MEDICINE

## 2024-10-07 PROCEDURE — 99214 OFFICE O/P EST MOD 30 MIN: CPT | Performed by: INTERNAL MEDICINE

## 2024-10-07 PROCEDURE — 1159F MED LIST DOCD IN RCRD: CPT | Performed by: INTERNAL MEDICINE

## 2024-10-07 PROCEDURE — 87389 HIV-1 AG W/HIV-1&-2 AB AG IA: CPT

## 2024-10-07 PROCEDURE — 1126F AMNT PAIN NOTED NONE PRSNT: CPT | Performed by: INTERNAL MEDICINE

## 2024-10-07 PROCEDURE — 36415 COLL VENOUS BLD VENIPUNCTURE: CPT

## 2024-10-07 RX ORDER — ALLOPURINOL 300 MG/1
300 TABLET ORAL DAILY
Qty: 30 TABLET | Refills: 0 | Status: SHIPPED | OUTPATIENT
Start: 2024-10-07 | End: 2025-04-05

## 2024-10-07 RX ORDER — ACYCLOVIR 400 MG/1
400 TABLET ORAL 2 TIMES DAILY
Qty: 60 TABLET | Refills: 11 | Status: SHIPPED | OUTPATIENT
Start: 2024-10-07 | End: 2025-10-02

## 2024-10-07 ASSESSMENT — PAIN SCALES - GENERAL: PAINLEVEL: 0-NO PAIN

## 2024-10-07 NOTE — PATIENT INSTRUCTIONS
I have called in 3 prescriptions:   Zanubrutinib ( Brukinsa)  is the medication for CLL, 2 tablets twice a day, let me know when you receive it.  Allopurinol is to protect your kidneys when you start zanubrutinib, take 1 pill daily for 30 days, stop immediately if you develop a rash.  Acyclovir 1 tablet twice a days to prevent cold sores, you will take this indefinitely

## 2024-10-07 NOTE — PROGRESS NOTES
Patient ID: Herlinda Springer is a 78 y.o. female.    Diagnosis:   Problem List Items Addressed This Visit       CLL (chronic lymphocytic leukemia) (Multi)    Relevant Medications    acyclovir (Zovirax) 400 mg tablet    allopurinol (Zyloprim) 300 mg tablet    zanubrutinib (Brukinsa) 80 mg capsule    Other Relevant Orders    Clinic Appointment Request ISABEL MONET     Other Visit Diagnoses       Non-Hodgkin's lymphoma, unspecified body region, unspecified non-Hodgkin lymphoma type        Relevant Orders    CBC and Auto Differential    Comprehensive Metabolic Panel    Uric Acid    Phosphorus    Lactate Dehydrogenase    Hepatitis B Surface Antigen (Completed)    Hepatitis B Core Antibody, Total (Completed)    Hepatitis C Antibody (Completed)    HIV 1/2 Antigen/Antibody Screen with Reflex to Confirmation (Completed)             Treatment:   Oncology History Overview Note   Lymphocytosis 11/2023 CLL vs marginal zone lymphoma    Flow showing a CD5 positive, CD23 positive lymphoproliferative disorder, strong expression of CD20 and Xr377i and surface light chain atypical for CLL and MZL vs LPL was favored, who was referred by thoracic oncologist Dr Alva for workup of lymphocytosis. Patient has had very mild leukocytosis 12K since 3/2023, however increase to 22.3 in November with preserved hgb and platelets..     Additional database:     SPEP faint IgM kappa and free lambd lyte chsins too low to quantitate, B2 2.9,  ( nl)  IgH heavy chain, not mutated  NGS results:   DISEASE ASSOCIATED GENOMIC FINDINGS:   AUSTIN p.* ([id number] c.3574A>T)  TP53 p.N239D (NM_000546 c.715A>G)  TP53 p.L072Xhs*21 ([id number]_306delinsA)     CT scans to followup on her lung cancer 2/26/24 shows no splenomegally and no lymphadenopathy     CLL (chronic lymphocytic leukemia) (Multi)   3/6/2024 Initial Diagnosis    CLL (chronic lymphocytic leukemia) (CMS/HCC)         Past Medical History:    Has been treated for high  cholesterol     Past Medical History:   Diagnosis Date    Acute maxillary sinusitis, unspecified 09/04/2019    Acute non-recurrent maxillary sinusitis    Acute upper respiratory infection, unspecified 01/16/2015    Acute URI    Acute upper respiratory infection, unspecified 02/15/2018    Acute URI    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    Benign neoplasm of pituitary gland (Multi) 02/15/2018    Microprolactinoma    Encounter for other preprocedural examination     Preoperative clearance    Episodic paroxysmal hemicrania, 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    Paroxysmal hemicrania    Essential (primary) hypertension 02/15/2018    White coat syndrome with hypertension    Laceration without foreign body of scalp, initial encounter 01/15/2014    Laceration of scalp    Lobar pneumonia, unspecified organism (CMS-HCC) 09/18/2019    Lobar pneumonia    Melanocytic nevi, unspecified 02/15/2018    Benign mole    Migraine with aura, not intractable, without status migrainosus 02/15/2018    Classic migraine with aura    Other conditions influencing health status 02/15/2018    History of cough    Other conditions influencing health status 11/21/2014    History of cough    Other conditions influencing health status 10/12/2015    No significant past surgical history    Other specified disorders of bone density and structure, unspecified site 02/15/2018    Osteopenia    Pain in left ankle and joints of left foot     Chronic pain of left ankle    Pain in left ankle and joints of left foot 05/11/2017    Acute left ankle pain    Pain in left knee     Acute pain of left knee    Pain in left knee 05/11/2017    Acute pain of left knee    Pain in left knee 02/15/2018    Acute pain of left knee    Personal history of other diseases of the musculoskeletal system and connective tissue 06/18/2015    History of muscle pain    Personal history of other diseases of the respiratory system 11/17/2014    History of  acute sinusitis    Personal history of other diseases of the respiratory system 2018    History of acute bronchitis    Personal history of other diseases of the respiratory system 02/15/2018    History of acute sinusitis    Personal history of other endocrine, nutritional and metabolic disease 2016    History of vitamin D deficiency    Personal history of other specified conditions 2019    History of chronic cough    Personal history of other specified conditions 02/15/2018    History of shortness of breath    Personal history of other specified conditions 2016    History of shortness of breath    Preglaucoma, unspecified, bilateral 10/02/2015    Glaucoma suspect, both eyes    Preglaucoma, unspecified, unspecified eye     Glaucoma suspect    Primary open-angle glaucoma, left eye, severe stage 10/26/2016    Primary open angle glaucoma of left eye, severe stage    Primary open-angle glaucoma, right eye, moderate stage 2022    Primary open angle glaucoma of right eye, moderate stage    Rupture of popliteal cyst 2015    Ruptured Bakers cyst    Rupture of popliteal cyst 02/15/2018    Ruptured Bakers cyst    Unspecified asthma with (acute) exacerbation ([facility]) 2019    Acute asthma exacerbation    Unspecified asthma with (acute) exacerbation ([facility]) 2018    Acute asthma exacerbation    Unspecified blepharitis left eye, unspecified eyelid 10/12/2015    Blepharitis of left eye    Unspecified glaucoma 02/15/2018    Glaucoma       Surgical History:     Past Surgical History:   Procedure Laterality Date    OTHER SURGICAL HISTORY  10/12/2015    Anal Fissurectomy    TONSILLECTOMY  2014    Tonsillectomy        Family History:   No cancers in family, one sister age 90, sister  in  after falling.   Family History   Problem Relation Name Age of Onset    Migraines Mother      Glaucoma Father          suspect       Social History:  Retired , worked as   at  and then at [employer], has been in [place] for 40 years,  for 45 yrs to second . no children. Light Former smoker as a younger adult, born in [place] emigrated in 1968.   Social History     Tobacco Use    Smoking status: Former     Types: Cigarettes   Vaping Use    Vaping status: Never Used   Substance Use Topics    Alcohol use: Never    Drug use: Never      -------------------------------------------------------------------------------------------------------  Subjective       Ms Springer is a 77 yo female h/o stage IV NSCLC (RUL mass, LT adrenal mets, mediastinal and cervical LN) s/p carbo/pem/pem (carbo given in 2021, pembro finished 8/2023) and RT now with stable disease (on surveillance), lymphocytosis with flow showing a CD5 positive, CD23 positive lymphoproliferative disorder,  strong expression of CD20 and Dh427l and surface light chain atypical for CLL and  MZL vs LPL was favored, who was referred by thoracic oncologist Dr Alva for workup of lymphocytosis. Patient has had very mild leukocytosis 12K since 3/2023, however increase to 22.3 in November prompted further evaluation. She has preserved hgb and platelets.     Additional database:     SPEP faint IgM kappa and free lambd lyte chsins too low to quantitate, B2 2.9,  ( nl)  IgH heavy chain, not mutated  NGS results:   DISEASE ASSOCIATED GENOMIC FINDINGS:   AUSTIN p.* (NM_000051 c.3574A>T)  TP53 p.N239D (NM_000546 c.715A>G)  TP53 p.U454Pth*21 ([id number] c.[id number])   CT scans to followup on her lung cancer 2/26/24 shows no splenomegally and no lymphadenopathy    10/7/24 Patient seen today for follow up with . Doing well and clinically asymptomatic.  However CBC from 10/4/24 shows a wbc of 99.6 up from 70.8, more concerning is hgb 10 down from 11  and platets of 122K  down from 176 compared to  8/13/24. No fevers, night sweats, weight loss. Normal appetite. No abdominal pain, normal bowel movements. No  bleeding or bruising. No other concerns. Had labs done a week prior to visit today - WBC increased to 70.8 (doubled over 6 months), Hb 11.0 (decreased by 1.7g/dl over 6 months), platelets 176 (stable).     Review of Systems   All other systems reviewed and are negative.     -------------------------------------------------------------------------------------------------------  Objective   BSA: 1.59 meters squared  /51   Pulse 98   Temp 36.2 °C (97.2 °F)   Resp 17   Wt 56.3 kg (124 lb 1.9 oz)   SpO2 100%   BMI 21.29 kg/m²       Physical Exam  Constitutional:       Appearance: Normal appearance.      Comments: Fit and well looks a decade younger than stated age   HENT:      Head: Normocephalic and atraumatic.      Nose: Nose normal.   Eyes:      Extraocular Movements: Extraocular movements intact.      Conjunctiva/sclera: Conjunctivae normal.      Pupils: Pupils are equal, round, and reactive to light.   Cardiovascular:      Rate and Rhythm: Normal rate and regular rhythm.   Pulmonary:      Breath sounds: Normal breath sounds.   Chest:   Breasts:     Right: Normal.      Left: Normal.   Abdominal:      General: Abdomen is flat. Bowel sounds are normal.      Palpations: Abdomen is soft. There is mass (spleen 2-3 cm BCM).   Musculoskeletal:         General: Normal range of motion.   Skin:     General: Skin is warm and dry.   Neurological:      General: No focal deficit present.      Mental Status: She is oriented to person, place, and time.   Psychiatric:         Mood and Affect: Mood normal.         Behavior: Behavior normal.         Thought Content: Thought content normal.         Performance Status:  Asymptomatic  -------------------------------------------------------------------------------------------------------  Assessment/Plan      Ms Springer is a 79 yo female h/o stage [place] NSCLC (RUL mass, LT adrenal mets, mediastinal and cervical LN) s/p carbo/pem/pem (carbo given in 2021, pembro finished 8/2023) and  RT (lung mass and adrenal mass) now with stable disease (on surveillance), new onset CD5, CD23 lymphocytosis with flow c/f MZL vs LPL, HTN, hypothyroidism, asthma, who was referred by thoracic oncologist Dr Alva for workup of lymphocytosis.     1 Atypical CLL    Lymphocytosis first noticed in 3/2023 at 12k, but increased to 22.3 in November 2023.  She has preserved hgb and platelets  and is assymptomatic. ALC doubled in 2023. New borderline splenomegaly on CT in 10/2023.  PB flow showed CD5+ clonal ppl, atypical for CLL, mainly concern for MZL vs LPL.  Additional database notable for TP 53 mutations, absence of MYD88 goes against LPL, so I suspect this is likely an atypical CLL.     According to IPSS score for asymptomatic CLL, she has high risk features in the form of IGHV unmutated and TP53x2 mutation, lymphocytosis>15,000    WBC   Date Value Ref Range Status   10/01/2024 99.6 (HH) 4.4 - 11.3 x10*3/uL Final     Comment:     Previous result verified on 10/1/2024 1001 on specimen/case [id number] called with component WBC Auto for procedure CBC and Auto Differential with value 99.6 x10*3/uL.   08/13/2024 70.8 (HH) 4.4 - 11.3 x10*3/uL Final   06/04/2024 49.5 (H) 4.4 - 11.3 x10*3/uL Final   05/22/2024 48.4 (H) 4.4 - 11.3 x10*3/uL Final   02/22/2024 27.6 (H) 4.4 - 11.3 x10*3/uL Final     Hemoglobin   Date Value Ref Range Status   10/01/2024 10.0 (L) 12.0 - 16.0 g/dL Final   08/13/2024 11.0 (L) 12.0 - 16.0 g/dL Final   06/04/2024 11.7 (L) 12.0 - 16.0 g/dL Final   05/22/2024 11.5 (L) 12.0 - 16.0 g/dL Final   02/22/2024 11.7 (L) 12.0 - 16.0 g/dL Final     10/7/24  Patients wbc up to 96K, more concerning however is drop in hgb and platelets.  I have recommended starting zanubrutinib.  Side effects reviewed by myself and Luther Muniz, PharmD with cardiac arrhthmias and bleeding being most significant. Patient information provided and chemo consent signed. I reviewed possible interactions with herbal supplements in  [employer] and did not find any significant interactions. All questions answered. Prescriptions sent to  specialty and supportive meds including acyclovir and allopurinol reviewed. Hepatitis serologies sent and are negative    2. NSCLC adenocarcinoma  - in remission, managed by thoracic oncologists  - scans in Nov 2024    3. Health maintenance,  9/7/24 COVID, 9/16/24 RSV, and 9/21/24 influenza vaccine.      RTC: 10/28 for labs and visit        -------------------------------------------------------------------------------------------------------

## 2024-10-07 NOTE — PROGRESS NOTES
ONCOLOGY PHARMACY CHEMOTHERAPY EDUCATION NOTE     Diagnosis: CLL    Prescriber: Dr. Huang    Current Outpatient Medications on File Prior to Visit   Medication Sig Dispense Refill    acyclovir (Zovirax) 400 mg tablet Take 1 tablet (400 mg) by mouth 2 times a day. 60 tablet 11    allopurinol (Zyloprim) 300 mg tablet Take 1 tablet (300 mg) by mouth once daily. Call Dr. Huang and discontinue immediately if you develop a rash 30 tablet 0    ALPRAZolam (Xanax) 0.5 mg tablet Take 1 tablet (0.5 mg) by mouth 3 times a day as needed for anxiety. 90 tablet 0    ascorbic acid (Vitamin C) 500 mg tablet Take 1 tablet (500 mg) by mouth once daily.      b complex vitamins capsule Take by mouth.      calcium carbonate 600 mg calcium (1,500 mg) tablet Take 1 tablet (1,500 mg) by mouth 2 times a day.      calcium carbonate/vitamin D3 (CALCIUM 600 + D,3, ORAL) Take by mouth.      coenzyme Q-10 100 mg capsule Take 1 capsule (100 mg) by mouth.      docusate sodium (Stool Softener) 100 mg tablet Take by mouth.      folic acid (Folvite) 1 mg tablet Take 1 tablet (1 mg) by mouth once daily.      lecithin, soy 1,200 mg capsule Take 1,200 mg by mouth.      lysine 1,000 mg tablet Take 1 tablet (1,000 mg) by mouth.      melatonin 5 mg tablet Take 1 tablet (5 mg) by mouth once daily at bedtime.      MILK THISTLE ORAL Take 250 mg by mouth.      montelukast (Singulair) 10 mg tablet TAKE 1 TABLET (10 MG) BY MOUTH ONCE DAILY IN THE EVENING. 90 tablet 1    psyllium husk, with sugar, (Metamucil, with sugar,) 3.4 gram/12 gram powder Take by mouth.      rosuvastatin (Crestor) 10 mg tablet Take 0.5 tablets (5 mg) by mouth once daily. 90 tablet 1    selenium (SELENIMIN ORAL) Take by mouth.      Synthroid 75 mcg tablet Take 1 tablet (75 mcg) by mouth once daily. Five days/week, M-F 90 tablet 1    turmeric root extract 500 mg tablet Take 500 mg by mouth.      zanubrutinib (Brukinsa) 80 mg capsule Take 2 capsules (160 mg total) by mouth 2 times a day.   Swallow whole. 120 capsule 11    [DISCONTINUED] ALPRAZolam (Xanax) 0.5 mg tablet Take 1 tablet (0.5 mg) by mouth 3 times a day as needed for anxiety. 90 tablet 0    [DISCONTINUED] cyanocobalamin (Vitamin B-12) 500 mcg tablet Take by mouth.       No current facility-administered medications on file prior to visit.     Treatment Plan       None            Note:      Pharmacist consulted to provide education on Zanubrutinib  chemotherapy via In-Person  visit.      1.  Chemotherapy Education: The chemotherapy regimen Zanubrutinib was discussed with patient and caregiver/s including treatment schedule, potential side effects, and management of side effects.  Potential side effects include but are not limited to: chemotherapy side effects: neutropenia, infection risk, anemia, fatigue, weakness, low energy, thrombocytopenia, bleeding/bruising, muscle or joint pain, skin rash, afib/aflutter, tumor lysis syndrome, and transient lymphocytosis .  Management techniques of these side effects were discussed, such as blood count checks, prophylactic anti-infective medicines, temperature checks, electrolyte monitoring, EKGs, and moisturizer and sunblock SPF30 use.  Written materials were provided including drug-specific side effect handouts.  Prescriptions for supportive care/prophylaxis (if applicable) medications were also discussed in terms of use and potential side effects.      2.  Home Medications: Reviewed patients documented home medications. Drug interactions identified between chemotherapy and patient’s home medications:  Minor interaction between alprazolam and zanubrutinib- will monitor blood counts and adjust if needed *.  All questions were answered.  patient and caregiver/s verbalized understanding of the plan of care and management of side effects.  Spent approximately 25 minutes coordinating care and patient interaction.  Will follow-up as necessary.        Thank you for consulting [facility] Oncology  Pharmacy Team.   Please don’t hesitate to reach out for further questions regarding this patient.     Davi Muniz, [facility], PharmD

## 2024-10-08 ENCOUNTER — SPECIALTY PHARMACY (OUTPATIENT)
Dept: PHARMACY | Facility: CLINIC | Age: 79
End: 2024-10-08

## 2024-10-08 PROCEDURE — RXMED WILLOW AMBULATORY MEDICATION CHARGE

## 2024-10-08 NOTE — PROGRESS NOTES
Specialty Pharmacy    Brukinsa 80mg    Coverage has been approved for this patient's medication from 01.01.24-12.31.24.     This patient has scheduled their medication delivery with  Specialty Pharmacy.      They should receive their medication Thursday, 10.10.24.    Thank you,    Odessa Poe, [facility]  Pharmacy Support [facility]  Specialty [facility]  [address] [place] [address], [id number]  T: [phone]  F: [phone]  [email or web address]

## 2024-10-09 ENCOUNTER — APPOINTMENT (OUTPATIENT)
Dept: INTEGRATIVE MEDICINE | Facility: CLINIC | Age: 79
End: 2024-10-09
Payer: MEDICARE

## 2024-10-09 ENCOUNTER — PHARMACY VISIT (OUTPATIENT)
Dept: PHARMACY | Facility: CLINIC | Age: 79
End: 2024-10-09
Payer: MEDICARE

## 2024-10-09 ENCOUNTER — SPECIALTY PHARMACY (OUTPATIENT)
Dept: HEMATOLOGY/ONCOLOGY | Facility: HOSPITAL | Age: 79
End: 2024-10-09

## 2024-10-09 NOTE — PROGRESS NOTES
[facility] Clinical Note    Herlinda Springer is a 78 y.o. female, who is on the specialty pharmacy service for management of: Oncology Core with status of: (Enrolled)     Herlinda was contacted on 10/9/2024.    Refer to the encounter summary report for documentation details about patient counseling and education.      Medication Adherence  The importance of adherence was discussed with the patient and they were advised to take the medication as prescribed by their provider. Herlinda was encouraged to call her physician's office if they have a question regarding a missed dose.       Patient advised to contact the pharmacy if there are any changes to her medication list, including prescriptions, OTC medications, herbal products, or supplements. Patient was advised of [facility]’s dispensing process, refill timeline, contact information ([phone]), and patient management follow up. Patient confirmed understanding of education conducted during assessment. All patient questions and concerns were addressed to the best of my ability. Patient was encouraged to contact the specialty pharmacy with any questions or concerns.    Confirmed follow-up outreaches are properly scheduled. Reviewed goals of therapy in the program targets.    Davi Muniz, PharmD

## 2024-10-10 ENCOUNTER — TELEPHONE (OUTPATIENT)
Dept: ADMISSION | Facility: HOSPITAL | Age: 79
End: 2024-10-10
Payer: MEDICARE

## 2024-10-10 NOTE — TELEPHONE ENCOUNTER
Herlinda received her Brukinsa in the mail and she plans to start it today. FUV 10/28 with Isabel Barton.   Just an FYI.

## 2024-10-15 ENCOUNTER — APPOINTMENT (OUTPATIENT)
Dept: INTEGRATIVE MEDICINE | Facility: CLINIC | Age: 79
End: 2024-10-15
Payer: MEDICARE

## 2024-10-17 ENCOUNTER — INFUSION (OUTPATIENT)
Dept: HEMATOLOGY/ONCOLOGY | Facility: HOSPITAL | Age: 79
End: 2024-10-17
Payer: MEDICARE

## 2024-10-17 ENCOUNTER — HOSPITAL ENCOUNTER (OUTPATIENT)
Dept: RADIOLOGY | Facility: HOSPITAL | Age: 79
Discharge: HOME | End: 2024-10-17
Payer: MEDICARE

## 2024-10-17 ENCOUNTER — OFFICE VISIT (OUTPATIENT)
Dept: HEMATOLOGY/ONCOLOGY | Facility: HOSPITAL | Age: 79
End: 2024-10-17
Payer: MEDICARE

## 2024-10-17 ENCOUNTER — NURSE TRIAGE (OUTPATIENT)
Dept: ADMISSION | Facility: HOSPITAL | Age: 79
End: 2024-10-17
Payer: MEDICARE

## 2024-10-17 VITALS
WEIGHT: 122.36 LBS | SYSTOLIC BLOOD PRESSURE: 130 MMHG | DIASTOLIC BLOOD PRESSURE: 55 MMHG | OXYGEN SATURATION: 99 % | TEMPERATURE: 98.4 F | BODY MASS INDEX: 20.99 KG/M2 | HEART RATE: 102 BPM | RESPIRATION RATE: 18 BRPM

## 2024-10-17 DIAGNOSIS — C91.10 CLL (CHRONIC LYMPHOCYTIC LEUKEMIA) (MULTI): ICD-10-CM

## 2024-10-17 DIAGNOSIS — C91.10 CLL (CHRONIC LYMPHOCYTIC LEUKEMIA) (MULTI): Primary | ICD-10-CM

## 2024-10-17 LAB
ALBUMIN SERPL BCP-MCNC: 4.4 G/DL (ref 3.4–5)
ALP SERPL-CCNC: 101 U/L (ref 33–136)
ALT SERPL W P-5'-P-CCNC: 11 U/L (ref 7–45)
ANION GAP SERPL CALC-SCNC: 13 MMOL/L (ref 10–20)
APPEARANCE UR: CLEAR
AST SERPL W P-5'-P-CCNC: 17 U/L (ref 9–39)
BASOPHILS # BLD AUTO: 0.07 X10*3/UL (ref 0–0.1)
BASOPHILS NFR BLD AUTO: 0 %
BILIRUB SERPL-MCNC: 0.5 MG/DL (ref 0–1.2)
BILIRUB UR STRIP.AUTO-MCNC: NEGATIVE MG/DL
BUN SERPL-MCNC: 16 MG/DL (ref 6–23)
BURR CELLS BLD QL SMEAR: NORMAL
CALCIUM SERPL-MCNC: 9.4 MG/DL (ref 8.6–10.3)
CHLORIDE SERPL-SCNC: 102 MMOL/L (ref 98–107)
CO2 SERPL-SCNC: 25 MMOL/L (ref 21–32)
COLOR UR: ABNORMAL
CREAT SERPL-MCNC: 0.94 MG/DL (ref 0.5–1.05)
EGFRCR SERPLBLD CKD-EPI 2021: 61 ML/MIN/1.73M*2
EOSINOPHIL # BLD AUTO: 0.19 X10*3/UL (ref 0–0.4)
EOSINOPHIL NFR BLD AUTO: 0.1 %
ERYTHROCYTE [DISTWIDTH] IN BLOOD BY AUTOMATED COUNT: 14.5 % (ref 11.5–14.5)
GLUCOSE SERPL-MCNC: 132 MG/DL (ref 74–99)
GLUCOSE UR STRIP.AUTO-MCNC: NORMAL MG/DL
HCT VFR BLD AUTO: 34.3 % (ref 36–46)
HGB BLD-MCNC: 10.4 G/DL (ref 12–16)
IMM GRANULOCYTES # BLD AUTO: 0.74 X10*3/UL (ref 0–0.5)
IMM GRANULOCYTES NFR BLD AUTO: 0.4 % (ref 0–0.9)
KETONES UR STRIP.AUTO-MCNC: NEGATIVE MG/DL
LDH SERPL L TO P-CCNC: 164 U/L (ref 84–246)
LEUKOCYTE ESTERASE UR QL STRIP.AUTO: NEGATIVE
LYMPHOCYTES # BLD AUTO: 172.37 X10*3/UL (ref 0.8–3)
LYMPHOCYTES NFR BLD AUTO: 87.9 %
MCH RBC QN AUTO: 27.4 PG (ref 26–34)
MCHC RBC AUTO-ENTMCNC: 30.3 G/DL (ref 32–36)
MCV RBC AUTO: 91 FL (ref 80–100)
MONOCYTES # BLD AUTO: 15.05 X10*3/UL (ref 0.05–0.8)
MONOCYTES NFR BLD AUTO: 7.7 %
NEUTROPHILS # BLD AUTO: 7.62 X10*3/UL (ref 1.6–5.5)
NEUTROPHILS NFR BLD AUTO: 3.9 %
NITRITE UR QL STRIP.AUTO: NEGATIVE
NRBC BLD-RTO: 0 /100 WBCS (ref 0–0)
OVALOCYTES BLD QL SMEAR: NORMAL
PH UR STRIP.AUTO: 5.5 [PH]
PLATELET # BLD AUTO: 162 X10*3/UL (ref 150–450)
POLYCHROMASIA BLD QL SMEAR: NORMAL
POTASSIUM SERPL-SCNC: 4 MMOL/L (ref 3.5–5.3)
PROT SERPL-MCNC: 7.2 G/DL (ref 6.4–8.2)
PROT UR STRIP.AUTO-MCNC: NEGATIVE MG/DL
RBC # BLD AUTO: 3.79 X10*6/UL (ref 4–5.2)
RBC # UR STRIP.AUTO: NEGATIVE /UL
RBC MORPH BLD: NORMAL
SCHISTOCYTES BLD QL SMEAR: NORMAL
SODIUM SERPL-SCNC: 136 MMOL/L (ref 136–145)
SP GR UR STRIP.AUTO: 1
UROBILINOGEN UR STRIP.AUTO-MCNC: NORMAL MG/DL
WBC # BLD AUTO: 196 X10*3/UL (ref 4.4–11.3)

## 2024-10-17 PROCEDURE — 83615 LACTATE (LD) (LDH) ENZYME: CPT

## 2024-10-17 PROCEDURE — 36591 DRAW BLOOD OFF VENOUS DEVICE: CPT

## 2024-10-17 PROCEDURE — 71046 X-RAY EXAM CHEST 2 VIEWS: CPT

## 2024-10-17 PROCEDURE — 3078F DIAST BP <80 MM HG: CPT | Performed by: NURSE PRACTITIONER

## 2024-10-17 PROCEDURE — 80053 COMPREHEN METABOLIC PANEL: CPT

## 2024-10-17 PROCEDURE — 81003 URINALYSIS AUTO W/O SCOPE: CPT

## 2024-10-17 PROCEDURE — 3075F SYST BP GE 130 - 139MM HG: CPT | Performed by: NURSE PRACTITIONER

## 2024-10-17 PROCEDURE — 99215 OFFICE O/P EST HI 40 MIN: CPT | Performed by: NURSE PRACTITIONER

## 2024-10-17 PROCEDURE — 85025 COMPLETE CBC W/AUTO DIFF WBC: CPT

## 2024-10-17 RX ORDER — ALBUTEROL SULFATE 90 UG/1
2 INHALANT RESPIRATORY (INHALATION) EVERY 6 HOURS PRN
Qty: 18 G | Refills: 11 | Status: SHIPPED | OUTPATIENT
Start: 2024-10-17 | End: 2025-10-17

## 2024-10-17 NOTE — PROGRESS NOTES
Pt. Seen by Gurpreet Segura NP today.  Pt. Stating that symptoms improving, CXR, EKG normal.  NP will follow up with urine results.

## 2024-10-17 NOTE — TELEPHONE ENCOUNTER
Secure chat sent to team and [facility] Heme clinic.  Patient is scheduled for [facility] Heme visit at 1:30pm today. RN called patient and she is agreeable with plan and will arrive at 1:30.  Denies additional questions/concerns.

## 2024-10-17 NOTE — TELEPHONE ENCOUNTER
Patient called and states she is having side effects from Brukinsa.  She started Brukinsa, Acyclovir and Allopurinol on 10/10/24. She is now experiences vagina swelling - started 4 days ago.  She has been applying Cerave cream and sitz baths.  Denies itching, discharge.     Tightness in kidneys, back and chest area - intermittent.  Current tightness in diaphragm area - very mild. Started 2 days ago.     Also complaints about SOB at times, current mild SOB, started 2 days ago.  HX: asthma - has old prescription of Albuterol - would like to know if okay to use Albuterol but it is  since .  Would need new RX.      Lost 3 lbs since starting these medications - have been eating/drinking normally. Increasing fluid intake.     Also complaints of hot flashes, and chills.  No fever.        Additional Information   Commented on: Please tell me more about the problem you are having. the more detail you provide, the better.     Vaginal swelling, tightness in kidneys, back, chest.  SOB intermittent.  Weight loss, hot flashes.   Commented on: When did these problems start?     Started 2-4 days ago    Protocols used: Other

## 2024-10-18 ASSESSMENT — ENCOUNTER SYMPTOMS
SHORTNESS OF BREATH: 1
CHEST TIGHTNESS: 1

## 2024-10-18 NOTE — PROGRESS NOTES
Patient ID: Herlinda Springer is a 78 y.o. female.    Diagnosis:   Problem List Items Addressed This Visit       CLL (chronic lymphocytic leukemia) (Multi) - Primary    Relevant Medications    albuterol 90 mcg/actuation inhaler    Other Relevant Orders    CBC and Auto Differential (Completed)    Comprehensive Metabolic Panel (Completed)    XR chest 2 views (Completed)    ECG 12 lead (Ancillary Performed)    Urinalysis with Reflex Microscopic (Completed)    Lactate Dehydrogenase (Completed)        Treatment:   Oncology History Overview Note   Lymphocytosis 11/2023 CLL vs marginal zone lymphoma    Flow showing a CD5 positive, CD23 positive lymphoproliferative disorder, strong expression of CD20 and Iy661i and surface light chain atypical for CLL and MZL vs LPL was favored, who was referred by thoracic oncologist Dr Alva for workup of lymphocytosis. Patient has had very mild leukocytosis 12K since 3/2023, however increase to 22.3 in November with preserved hgb and platelets..     Additional database:     SPEP faint IgM kappa and free lambd lyte chsins too low to quantitate, B2 2.9,  ( nl)  IgH heavy chain, not mutated  NGS results:   DISEASE ASSOCIATED GENOMIC FINDINGS:   AUSTIN p.* (NM_000051 c.3574A>T)  TP53 p.N239D (NM_000546 c.715A>G)  TP53 p.H188Ush*21 ([id number] c.305_306delinsA)     CT scans to followup on her lung cancer 2/26/24 shows no splenomegally and no lymphadenopathy     CLL (chronic lymphocytic leukemia) (Multi)   3/6/2024 Initial Diagnosis    CLL (chronic lymphocytic leukemia) (CMS/HCC)         Past Medical History:    Has been treated for high cholesterol     Past Medical History:   Diagnosis Date    Acute maxillary sinusitis, unspecified 09/04/2019    Acute non-recurrent maxillary sinusitis    Acute upper respiratory infection, unspecified 01/16/2015    Acute URI    Acute upper respiratory infection, unspecified 02/15/2018    Acute URI    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/2017     Microprolactinoma    Benign neoplasm of pituitary gland (Multi) 02/15/2018    Microprolactinoma    Encounter for other preprocedural examination     Preoperative clearance    Episodic paroxysmal hemicrania, 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    Paroxysmal hemicrania    Essential (primary) hypertension 02/15/2018    White coat syndrome with hypertension    Laceration without foreign body of scalp, initial encounter 01/15/2014    Laceration of scalp    Lobar pneumonia, unspecified organism (CMS-HCC) 09/18/2019    Lobar pneumonia    Melanocytic nevi, unspecified 02/15/2018    Benign mole    Migraine with aura, not intractable, without status migrainosus 02/15/2018    Classic migraine with aura    Other conditions influencing health status 02/15/2018    History of cough    Other conditions influencing health status 11/21/2014    History of cough    Other conditions influencing health status 10/12/2015    No significant past surgical history    Other specified disorders of bone density and structure, unspecified site 02/15/2018    Osteopenia    Pain in left ankle and joints of left foot     Chronic pain of left ankle    Pain in left ankle and joints of left foot 05/11/2017    Acute left ankle pain    Pain in left knee     Acute pain of left knee    Pain in left knee 05/11/2017    Acute pain of left knee    Pain in left knee 02/15/2018    Acute pain of left knee    Personal history of other diseases of the musculoskeletal system and connective tissue 06/18/2015    History of muscle pain    Personal history of other diseases of the respiratory system 11/17/2014    History of acute sinusitis    Personal history of other diseases of the respiratory system 06/06/2018    History of acute bronchitis    Personal history of other diseases of the respiratory system 02/15/2018    History of acute sinusitis    Personal history of other endocrine, nutritional and metabolic disease 06/22/2016    History of vitamin D deficiency    Personal  history of other specified conditions 2019    History of chronic cough    Personal history of other specified conditions 02/15/2018    History of shortness of breath    Personal history of other specified conditions 2016    History of shortness of breath    Preglaucoma, unspecified, bilateral 10/02/2015    Glaucoma suspect, both eyes    Preglaucoma, unspecified, unspecified eye     Glaucoma suspect    Primary open-angle glaucoma, left eye, severe stage 10/26/2016    Primary open angle glaucoma of left eye, severe stage    Primary open-angle glaucoma, right eye, moderate stage 2022    Primary open angle glaucoma of right eye, moderate stage    Rupture of popliteal cyst 2015    Ruptured Bakers cyst    Rupture of popliteal cyst 02/15/2018    Ruptured Bakers cyst    Unspecified asthma with (acute) exacerbation ([facility]) 2019    Acute asthma exacerbation    Unspecified asthma with (acute) exacerbation ([facility]) 2018    Acute asthma exacerbation    Unspecified blepharitis left eye, unspecified eyelid 10/12/2015    Blepharitis of left eye    Unspecified glaucoma 02/15/2018    Glaucoma       Surgical History:     Past Surgical History:   Procedure Laterality Date    OTHER SURGICAL HISTORY  10/12/2015    Anal Fissurectomy    TONSILLECTOMY  2014    Tonsillectomy        Family History:   No cancers in family, one sister age 90, sister  in  after falling.   Family History   Problem Relation Name Age of Onset    Migraines Mother      Glaucoma Father          suspect       Social History:  Retired , worked as  at  and then at [employer], has been in [place] for 40 years,  for 45 yrs to second . no children. Light Former smoker as a younger adult, born in [place] emig[place] in .   Social History     Tobacco Use    Smoking status: Former     Types: Cigarettes   Vaping Use    Vaping status: Never Used   Substance Use Topics    Alcohol  use: Never    Drug use: Never      -------------------------------------------------------------------------------------------------------  Subjective       Ms Springer is a 79 yo female h/o stage IV NSCLC (RUL mass, LT adrenal mets, mediastinal and cervical LN) s/p carbo/pem/pem (carbo given in 2021, pembro finished 8/2023) and RT now with stable disease (on surveillance), lymphocytosis with flow showing a CD5 positive, CD23 positive lymphoproliferative disorder,  strong expression of CD20 and Rr555l and surface light chain atypical for CLL and  MZL vs LPL was favored, who was referred by thoracic oncologist Dr Alva for workup of lymphocytosis. Patient has had very mild leukocytosis 12K since 3/2023, however increase to 22.3 in November prompted further evaluation. She has preserved hgb and platelets.     Additional database:     SPEP faint IgM kappa and free lambd lyte chsins too low to quantitate, B2 2.9,  ( nl)  IgH heavy chain, not mutated  NGS results:   DISEASE ASSOCIATED GENOMIC FINDINGS:   AUSTIN p.* (NM_000051 c.3574A>T)  TP53 p.N239D (NM_000546 c.715A>G)  TP53 p.S573Zfp*21 ([id number] c.305_306delinsA)   CT scans to followup on her lung cancer 2/26/24 shows no splenomegally and no lymphadenopathy    10/7/24 Patient seen today for follow up with . Doing well and clinically asymptomatic.  However CBC from 10/4/24 shows a wbc of 99.6 up from 70.8, more concerning is hgb 10 down from 11  and platets of 122K  down from 176 compared to  8/13/24. No fevers, night sweats, weight loss. Normal appetite. No abdominal pain, normal bowel movements. No bleeding or bruising. No other concerns. Had labs done a week prior to visit today - WBC increased to 70.8 (doubled over 6 months), Hb 11.0 (decreased by 1.7g/dl over 6 months), platelets 176 (stable).     10/17/24: Seen in [facility] for sick visit. Started Zanubrutinib on 10/10. Noticed urinary burning and vaginal inflammation/tenderness over past  couple of days. Starting to feel better today. Also noticed intermittent tightness in chest, flank, abdomen. No N/V/D.     Review of Systems   Respiratory:  Positive for chest tightness and shortness of breath.    Genitourinary:          Vaginal inflammation, tenderness with sitting   All other systems reviewed and are negative.     -------------------------------------------------------------------------------------------------------  Objective   BSA: 1.58 meters squared  /55 (BP Location: Right arm, Patient Position: Sitting, BP Cuff Size: Adult)   Pulse 102   Temp 36.9 °C (98.4 °F) (Temporal)   Resp 18   Wt 55.5 kg (122 lb 5.7 oz)   SpO2 99%   BMI 20.99 kg/m²       Physical Exam  Constitutional:       Appearance: Normal appearance.      Comments: Fit and well looks a decade younger than stated age   HENT:      Head: Normocephalic and atraumatic.      Nose: Nose normal.   Eyes:      Extraocular Movements: Extraocular movements intact.      Conjunctiva/sclera: Conjunctivae normal.      Pupils: Pupils are equal, round, and reactive to light.   Cardiovascular:      Rate and Rhythm: Normal rate and regular rhythm.   Pulmonary:      Breath sounds: Normal breath sounds.   Chest:   Breasts:     Right: Normal.      Left: Normal.   Abdominal:      General: Abdomen is flat. Bowel sounds are normal.      Palpations: Abdomen is soft. Mass: spleen 2-3 cm BCM.   Genitourinary:     General: Normal vulva.      Comments: No inflammation, erythema, or drainage on exam  Musculoskeletal:         General: Normal range of motion.   Skin:     General: Skin is warm and dry.   Neurological:      General: No focal deficit present.      Mental Status: She is oriented to person, place, and time.   Psychiatric:         Mood and Affect: Mood normal.         Behavior: Behavior normal.         Thought Content: Thought content normal.         Performance  Status:  Asymptomatic  -------------------------------------------------------------------------------------------------------  Assessment/Plan      Ms Springer is a 79 yo female h/o stage [place] NSCLC (RUL mass, LT adrenal mets, mediastinal and cervical LN) s/p carbo/pem/pem (carbo given in 2021, pembro finished 8/2023) and RT (lung mass and adrenal mass) now with stable disease (on surveillance), new onset CD5, CD23 lymphocytosis with flow c/f MZL vs LPL, HTN, hypothyroidism, asthma, who was referred by thoracic oncologist Dr Alva for workup of lymphocytosis.     1 Atypical CLL    Lymphocytosis first noticed in 3/2023 at 12k, but increased to 22.3 in November 2023.  She has preserved hgb and platelets  and is assymptomatic. ALC doubled in 2023. New borderline splenomegaly on CT in 10/2023.  PB flow showed CD5+ clonal ppl, atypical for CLL, mainly concern for MZL vs LPL.  Additional database notable for TP 53 mutations, absence of MYD88 goes against LPL, so I suspect this is likely an atypical CLL.     According to IPSS score for asymptomatic CLL, she has high risk features in the form of IGHV unmutated and TP53x2 mutation, lymphocytosis>15,000    WBC   Date Value Ref Range Status   10/17/2024 196.0 () 4.4 - 11.3 x10*3/uL Final     Comment:     Previous result verified on 10/17/2024 1426 on specimen/case [id number] called with component WBC Auto for procedure CBC and Auto Differential with value 196.0 x10*3/uL.   10/01/2024 99.6 (HH) 4.4 - 11.3 x10*3/uL Final     Comment:     Previous result verified on 10/1/2024 1001 on specimen/case [id number] called with component WBC Auto for procedure CBC and Auto Differential with value 99.6 x10*3/uL.   08/13/2024 70.8 (HH) 4.4 - 11.3 x10*3/uL Final   06/04/2024 49.5 (H) 4.4 - 11.3 x10*3/uL Final   05/22/2024 48.4 (H) 4.4 - 11.3 x10*3/uL Final     Hemoglobin   Date Value Ref Range Status   10/17/2024 10.4 (L) 12.0 - 16.0 g/dL Final   10/01/2024 10.0 (L) 12.0 - 16.0 g/dL  Final   2024 11.0 (L) 12.0 - 16.0 g/dL Final   2024 11.7 (L) 12.0 - 16.0 g/dL Final   2024 11.5 (L) 12.0 - 16.0 g/dL Final     10/7/24  Patients wbc up to 96K, more concerning however is drop in hgb and platelets.  I have recommended starting zanubrutinib.  Side effects reviewed by myself and Luther Muniz, PharmD with cardiac arrhthmias and bleeding being most significant. Patient information provided and chemo consent signed. I reviewed possible interactions with herbal supplements in micromedex and did not find any significant interactions. All questions answered. Prescriptions sent to  specialty and supportive meds including acyclovir and allopurinol reviewed. Hepatitis serologies sent and are negative    10/17/24: ,000; anticipated with new zanubrutinib. EKG normal. CXR without acute findings; subtle B/L GGO opacities since last CT. She has routine CT scheduled next month. Thinks chest tightness may be related to asthma; her albuterol inhaler is . Sent script for new Albuterol inhaler. Brief  exam does not show acute s/sx infection. UA negative for infection. I encouraged her to establish care with PCP or GYN if vaginal irritation/inflammation persists for complete exam.     2. NSCLC adenocarcinoma  - in remission, managed by thoracic oncologists  - scans in 2024    3. Health maintenance,  24 COVID, 24 RSV, and 24 influenza vaccine.      RTC: 10/28 for labs and visit        -------------------------------------------------------------------------------------------------------

## 2024-10-21 ENCOUNTER — TELEPHONE (OUTPATIENT)
Dept: HEMATOLOGY/ONCOLOGY | Facility: HOSPITAL | Age: 79
End: 2024-10-21
Payer: MEDICARE

## 2024-10-23 ENCOUNTER — APPOINTMENT (OUTPATIENT)
Dept: INTEGRATIVE MEDICINE | Facility: CLINIC | Age: 79
End: 2024-10-23
Payer: MEDICARE

## 2024-10-24 ENCOUNTER — TELEPHONE (OUTPATIENT)
Dept: ADMISSION | Facility: HOSPITAL | Age: 79
End: 2024-10-24
Payer: MEDICARE

## 2024-10-24 NOTE — TELEPHONE ENCOUNTER
Pt updated per DrCooper directive. Skin lesions and mouth sores normal side effect. As long as no spontaneous bleeding continue course as planned.   Pt notified and aware to call with any changes/concerns. Will keep FUV 10/28

## 2024-10-24 NOTE — TELEPHONE ENCOUNTER
Pt has noticed 3 1/2 cm bluefish spots on each lower leg- near ankle since Sunday. Spots are not raised, fluid filled, warm or painful to touch, No edema noted. No discoloration or temperature change in feet.   Pt also has noticed eruption of blood blisters in her mouth- she saw dentist who advised use of salt water rinses- which has resolved them, mostly- they are healing, not painful or disrupting nutritional intake.   Pt has both dermatology and Dr Sal ZAMBRANO on Monday 10/28.   Pt was seen by mal heme team on 10/17  Pt currently on Brukinsa 80mg. 2 capsules BID

## 2024-10-24 NOTE — TELEPHONE ENCOUNTER
Skin lesions typical for brukinsa as are mouth sores as long as no significant bleeding ie nosebleeds can continue medication

## 2024-10-28 ENCOUNTER — OFFICE VISIT (OUTPATIENT)
Dept: HEMATOLOGY/ONCOLOGY | Facility: HOSPITAL | Age: 79
End: 2024-10-28
Payer: MEDICARE

## 2024-10-28 ENCOUNTER — LAB (OUTPATIENT)
Dept: LAB | Facility: HOSPITAL | Age: 79
End: 2024-10-28
Payer: MEDICARE

## 2024-10-28 ENCOUNTER — OFFICE VISIT (OUTPATIENT)
Dept: DERMATOLOGY | Facility: HOSPITAL | Age: 79
End: 2024-10-28
Payer: MEDICARE

## 2024-10-28 VITALS
WEIGHT: 121.69 LBS | OXYGEN SATURATION: 99 % | SYSTOLIC BLOOD PRESSURE: 115 MMHG | RESPIRATION RATE: 17 BRPM | DIASTOLIC BLOOD PRESSURE: 51 MMHG | TEMPERATURE: 97 F | HEART RATE: 99 BPM | BODY MASS INDEX: 20.88 KG/M2

## 2024-10-28 DIAGNOSIS — L81.7 PIGMENTED PURPURIC DERMATOSIS: ICD-10-CM

## 2024-10-28 DIAGNOSIS — C91.10 CLL (CHRONIC LYMPHOCYTIC LEUKEMIA) (MULTI): ICD-10-CM

## 2024-10-28 DIAGNOSIS — Z12.83 SCREENING EXAM FOR SKIN CANCER: ICD-10-CM

## 2024-10-28 DIAGNOSIS — L81.4 LENTIGO: ICD-10-CM

## 2024-10-28 DIAGNOSIS — D22.9 MULTIPLE BENIGN NEVI: Primary | ICD-10-CM

## 2024-10-28 DIAGNOSIS — C85.90 NON-HODGKIN'S LYMPHOMA, UNSPECIFIED BODY REGION, UNSPECIFIED NON-HODGKIN LYMPHOMA TYPE: ICD-10-CM

## 2024-10-28 DIAGNOSIS — L82.1 SEBORRHEIC KERATOSIS: ICD-10-CM

## 2024-10-28 DIAGNOSIS — D48.5 NEOPLASM OF UNCERTAIN BEHAVIOR OF SKIN: ICD-10-CM

## 2024-10-28 DIAGNOSIS — K12.1 ORAL ULCERATION: ICD-10-CM

## 2024-10-28 LAB
ALBUMIN SERPL BCP-MCNC: 4.3 G/DL (ref 3.4–5)
ALP SERPL-CCNC: 75 U/L (ref 33–136)
ALT SERPL W P-5'-P-CCNC: 9 U/L (ref 7–45)
ANION GAP SERPL CALC-SCNC: 13 MMOL/L (ref 10–20)
AST SERPL W P-5'-P-CCNC: 14 U/L (ref 9–39)
BASOPHILS # BLD AUTO: 0.12 X10*3/UL (ref 0–0.1)
BASOPHILS NFR BLD AUTO: 0.1 %
BILIRUB SERPL-MCNC: 0.4 MG/DL (ref 0–1.2)
BUN SERPL-MCNC: 23 MG/DL (ref 6–23)
CALCIUM SERPL-MCNC: 9 MG/DL (ref 8.6–10.3)
CHLORIDE SERPL-SCNC: 102 MMOL/L (ref 98–107)
CO2 SERPL-SCNC: 27 MMOL/L (ref 21–32)
CREAT SERPL-MCNC: 0.92 MG/DL (ref 0.5–1.05)
DACRYOCYTES BLD QL SMEAR: NORMAL
EGFRCR SERPLBLD CKD-EPI 2021: 63 ML/MIN/1.73M*2
EOSINOPHIL # BLD AUTO: 0.36 X10*3/UL (ref 0–0.4)
EOSINOPHIL NFR BLD AUTO: 0.2 %
ERYTHROCYTE [DISTWIDTH] IN BLOOD BY AUTOMATED COUNT: 14.9 % (ref 11.5–14.5)
GLUCOSE SERPL-MCNC: 101 MG/DL (ref 74–99)
HCT VFR BLD AUTO: 33.7 % (ref 36–46)
HGB BLD-MCNC: 10.3 G/DL (ref 12–16)
IMM GRANULOCYTES # BLD AUTO: 0.55 X10*3/UL (ref 0–0.5)
IMM GRANULOCYTES NFR BLD AUTO: 0.3 % (ref 0–0.9)
LDH SERPL L TO P-CCNC: 131 U/L (ref 84–246)
LYMPHOCYTES # BLD AUTO: 157.31 X10*3/UL (ref 0.8–3)
LYMPHOCYTES NFR BLD AUTO: 92.6 %
MCH RBC QN AUTO: 27.6 PG (ref 26–34)
MCHC RBC AUTO-ENTMCNC: 30.6 G/DL (ref 32–36)
MCV RBC AUTO: 90 FL (ref 80–100)
MONOCYTES # BLD AUTO: 5.61 X10*3/UL (ref 0.05–0.8)
MONOCYTES NFR BLD AUTO: 3.3 %
NEUTROPHILS # BLD AUTO: 5.84 X10*3/UL (ref 1.6–5.5)
NEUTROPHILS NFR BLD AUTO: 3.5 %
NRBC BLD-RTO: 0 /100 WBCS (ref 0–0)
OVALOCYTES BLD QL SMEAR: NORMAL
PHOSPHATE SERPL-MCNC: 3.4 MG/DL (ref 2.5–4.9)
PLATELET # BLD AUTO: 137 X10*3/UL (ref 150–450)
PLATELET CLUMP BLD QL SMEAR: PRESENT
POTASSIUM SERPL-SCNC: 4 MMOL/L (ref 3.5–5.3)
PROT SERPL-MCNC: 6.8 G/DL (ref 6.4–8.2)
RBC # BLD AUTO: 3.73 X10*6/UL (ref 4–5.2)
RBC MORPH BLD: NORMAL
SCHISTOCYTES BLD QL SMEAR: NORMAL
SODIUM SERPL-SCNC: 138 MMOL/L (ref 136–145)
URATE SERPL-MCNC: 2.7 MG/DL (ref 2.3–6.7)
WBC # BLD AUTO: 169.8 X10*3/UL (ref 4.4–11.3)

## 2024-10-28 PROCEDURE — 36415 COLL VENOUS BLD VENIPUNCTURE: CPT

## 2024-10-28 PROCEDURE — 1159F MED LIST DOCD IN RCRD: CPT | Performed by: INTERNAL MEDICINE

## 2024-10-28 PROCEDURE — 1160F RVW MEDS BY RX/DR IN RCRD: CPT | Performed by: DERMATOLOGY

## 2024-10-28 PROCEDURE — 1159F MED LIST DOCD IN RCRD: CPT | Performed by: DERMATOLOGY

## 2024-10-28 PROCEDURE — 83615 LACTATE (LD) (LDH) ENZYME: CPT

## 2024-10-28 PROCEDURE — G2211 COMPLEX E/M VISIT ADD ON: HCPCS | Performed by: DERMATOLOGY

## 2024-10-28 PROCEDURE — 99214 OFFICE O/P EST MOD 30 MIN: CPT | Performed by: DERMATOLOGY

## 2024-10-28 PROCEDURE — 85025 COMPLETE CBC W/AUTO DIFF WBC: CPT

## 2024-10-28 PROCEDURE — 3074F SYST BP LT 130 MM HG: CPT | Performed by: INTERNAL MEDICINE

## 2024-10-28 PROCEDURE — 84550 ASSAY OF BLOOD/URIC ACID: CPT

## 2024-10-28 PROCEDURE — 3078F DIAST BP <80 MM HG: CPT | Performed by: INTERNAL MEDICINE

## 2024-10-28 PROCEDURE — 84100 ASSAY OF PHOSPHORUS: CPT

## 2024-10-28 PROCEDURE — 1126F AMNT PAIN NOTED NONE PRSNT: CPT | Performed by: INTERNAL MEDICINE

## 2024-10-28 PROCEDURE — 99214 OFFICE O/P EST MOD 30 MIN: CPT | Performed by: INTERNAL MEDICINE

## 2024-10-28 PROCEDURE — 84075 ASSAY ALKALINE PHOSPHATASE: CPT

## 2024-10-28 ASSESSMENT — DERMATOLOGY QUALITY OF LIFE (QOL) ASSESSMENT
DATE THE QUALITY-OF-LIFE ASSESSMENT WAS COMPLETED: 67141
ARE THERE EXCLUSIONS OR EXCEPTIONS FOR THE QUALITY OF LIFE ASSESSMENT: NO
RATE HOW BOTHERED YOU ARE BY SYMPTOMS OF YOUR SKIN PROBLEM (EG, ITCHING, STINGING BURNING, HURTING OR SKIN IRRITATION): 0 - NEVER BOTHERED
RATE HOW EMOTIONALLY BOTHERED YOU ARE BY YOUR SKIN PROBLEM (FOR EXAMPLE, WORRY, EMBARRASSMENT, FRUSTRATION): 0 - NEVER BOTHERED
RATE HOW BOTHERED YOU ARE BY EFFECTS OF YOUR SKIN PROBLEMS ON YOUR ACTIVITIES (EG, GOING OUT, ACCOMPLISHING WHAT YOU WANT, WORK ACTIVITIES OR YOUR RELATIONSHIPS WITH OTHERS): 0 - NEVER BOTHERED

## 2024-10-28 ASSESSMENT — DERMATOLOGY PATIENT ASSESSMENT
DO YOU USE SUNSCREEN: OCCASIONALLY
HAVE YOU HAD OR DO YOU HAVE VASCULAR DISEASE: NO
DO YOU USE A TANNING BED: NO
ARE YOU TRYING TO GET PREGNANT: NO
DO YOU HAVE ANY NEW OR CHANGING LESIONS: NO
ARE YOU ON BIRTH CONTROL: NO
HAVE YOU HAD OR DO YOU HAVE A STAPH INFECTION: NO
ARE YOU AN ORGAN TRANSPLANT RECIPIENT: NO
DO YOU HAVE IRREGULAR MENSTRUAL CYCLES: NO

## 2024-10-28 ASSESSMENT — PAIN SCALES - GENERAL: PAINLEVEL_OUTOF10: 0-NO PAIN

## 2024-10-28 ASSESSMENT — ITCH NUMERIC RATING SCALE: HOW SEVERE IS YOUR ITCHING?: 0

## 2024-10-28 ASSESSMENT — PATIENT GLOBAL ASSESSMENT (PGA): PATIENT GLOBAL ASSESSMENT: PATIENT GLOBAL ASSESSMENT:  1 - CLEAR

## 2024-10-29 ENCOUNTER — APPOINTMENT (OUTPATIENT)
Dept: INTEGRATIVE MEDICINE | Facility: CLINIC | Age: 79
End: 2024-10-29
Payer: MEDICARE

## 2024-10-31 ENCOUNTER — TELEPHONE (OUTPATIENT)
Dept: ADMISSION | Facility: HOSPITAL | Age: 79
End: 2024-10-31
Payer: MEDICARE

## 2024-10-31 DIAGNOSIS — C91.10 CLL (CHRONIC LYMPHOCYTIC LEUKEMIA) (MULTI): ICD-10-CM

## 2024-10-31 RX ORDER — ALLOPURINOL 300 MG/1
300 TABLET ORAL DAILY
Qty: 30 TABLET | Refills: 1 | OUTPATIENT
Start: 2024-10-31 | End: 2025-04-29

## 2024-11-01 ENCOUNTER — PATIENT OUTREACH (OUTPATIENT)
Dept: HEMATOLOGY/ONCOLOGY | Facility: HOSPITAL | Age: 79
End: 2024-11-01
Payer: MEDICARE

## 2024-11-04 ENCOUNTER — SPECIALTY PHARMACY (OUTPATIENT)
Dept: PHARMACY | Facility: CLINIC | Age: 79
End: 2024-11-04

## 2024-11-04 ENCOUNTER — HOSPITAL ENCOUNTER (OUTPATIENT)
Dept: RADIOLOGY | Facility: HOSPITAL | Age: 79
Discharge: HOME | End: 2024-11-04
Payer: MEDICARE

## 2024-11-04 DIAGNOSIS — C34.11 CANCER OF UPPER LOBE OF RIGHT LUNG (MULTI): ICD-10-CM

## 2024-11-04 PROCEDURE — 2550000001 HC RX 255 CONTRASTS

## 2024-11-04 PROCEDURE — 74177 CT ABD & PELVIS W/CONTRAST: CPT

## 2024-11-04 PROCEDURE — 71260 CT THORAX DX C+: CPT | Performed by: RADIOLOGY

## 2024-11-04 PROCEDURE — RXMED WILLOW AMBULATORY MEDICATION CHARGE

## 2024-11-04 PROCEDURE — 74177 CT ABD & PELVIS W/CONTRAST: CPT | Performed by: RADIOLOGY

## 2024-11-05 ENCOUNTER — PHARMACY VISIT (OUTPATIENT)
Dept: PHARMACY | Facility: CLINIC | Age: 79
End: 2024-11-05
Payer: MEDICARE

## 2024-11-06 ENCOUNTER — OFFICE VISIT (OUTPATIENT)
Dept: HEMATOLOGY/ONCOLOGY | Facility: HOSPITAL | Age: 79
End: 2024-11-06
Payer: MEDICARE

## 2024-11-06 VITALS
SYSTOLIC BLOOD PRESSURE: 134 MMHG | OXYGEN SATURATION: 100 % | DIASTOLIC BLOOD PRESSURE: 55 MMHG | WEIGHT: 122.58 LBS | TEMPERATURE: 96.4 F | BODY MASS INDEX: 21.03 KG/M2 | HEART RATE: 100 BPM | RESPIRATION RATE: 16 BRPM

## 2024-11-06 DIAGNOSIS — R93.89 THICKENED ENDOMETRIUM: Primary | ICD-10-CM

## 2024-11-06 DIAGNOSIS — C34.11 PRIMARY MALIGNANT NEOPLASM OF RIGHT UPPER LOBE OF LUNG (MULTI): ICD-10-CM

## 2024-11-06 PROCEDURE — 99417 PROLNG OP E/M EACH 15 MIN: CPT | Performed by: INTERNAL MEDICINE

## 2024-11-06 PROCEDURE — 99214 OFFICE O/P EST MOD 30 MIN: CPT | Performed by: INTERNAL MEDICINE

## 2024-11-06 PROCEDURE — 1126F AMNT PAIN NOTED NONE PRSNT: CPT | Performed by: INTERNAL MEDICINE

## 2024-11-06 PROCEDURE — 3078F DIAST BP <80 MM HG: CPT | Performed by: INTERNAL MEDICINE

## 2024-11-06 PROCEDURE — 1159F MED LIST DOCD IN RCRD: CPT | Performed by: INTERNAL MEDICINE

## 2024-11-06 PROCEDURE — 3075F SYST BP GE 130 - 139MM HG: CPT | Performed by: INTERNAL MEDICINE

## 2024-11-06 ASSESSMENT — PATIENT HEALTH QUESTIONNAIRE - PHQ9
2. FEELING DOWN, DEPRESSED OR HOPELESS: NOT AT ALL
SUM OF ALL RESPONSES TO PHQ9 QUESTIONS 1 AND 2: 0
1. LITTLE INTEREST OR PLEASURE IN DOING THINGS: NOT AT ALL

## 2024-11-06 ASSESSMENT — PAIN SCALES - GENERAL: PAINLEVEL_OUTOF10: 0-NO PAIN

## 2024-11-06 ASSESSMENT — COLUMBIA-SUICIDE SEVERITY RATING SCALE - C-SSRS
2. HAVE YOU ACTUALLY HAD ANY THOUGHTS OF KILLING YOURSELF?: NO
6. HAVE YOU EVER DONE ANYTHING, STARTED TO DO ANYTHING, OR PREPARED TO DO ANYTHING TO END YOUR LIFE?: NO
1. IN THE PAST MONTH, HAVE YOU WISHED YOU WERE DEAD OR WISHED YOU COULD GO TO SLEEP AND NOT WAKE UP?: NO

## 2024-11-07 ENCOUNTER — APPOINTMENT (OUTPATIENT)
Dept: HEMATOLOGY/ONCOLOGY | Facility: HOSPITAL | Age: 79
End: 2024-11-07
Payer: MEDICARE

## 2024-11-10 NOTE — PROGRESS NOTES
Patient ID: Herlinda Springer is a 78 y.o. female    Primary Care Provider: Alley Bui MD    DIAGNOSIS AND STAGING  Stage CLIFTON ([id number]) NSCLC (adenocarcinoma, TTF-1+, Napsin A+) of the RUL   Dx through left adrenal gland bx on 12/17/19     SITES OF DISEASE  Right upper lobe  Left adrenal gland   Brain - left parietal lobe      MOLECULAR GENOMICS  TP53 mutant   EGFR negative  ALK-1 negative   ROS1 negative  BRAF V600E negative  NTRK negative      PD-L1 TPS < 1%     PRIOR THERAPIES  Cycle # 1 carboplatin/pemetrexed/pembrolizumab - 01/15/20, cycle # 4 on 03/18/20  Starts maintenance pemetrexed/pembrolizumab on 04/08/20  Pemetrexed discontinued on 03/01/2023 due to decline GFR'   Last pembrolizumab dose on 08/02/2023     CURRENT THERAPY  Observation     CURRENT ONCOLOGICAL PROBLEMS  MRI brain 12/22/19 - 2 mm enhancing lesion left parietal lobe, unclear if metastasis - repeat MRI 06/26/20 showed resolution of that finding   CT 06/26/20 shows oligo-progression of RUL - left adrenal metastasis stable - s/p RT to the RUL - completed 08/19/20     HISTORY OF PRESENT ILLNESS  This is a former smoker (quit 25 years prior to dx)who presented with new onset respiratory sx (cough), decreased appetite and weight loss  (lost 13 lbs in 2 months).   Chest CT 11/07/19 showed a RUL nodule measuring 4 cm, mediastinal adenopathy (level 2L measuring 1.8 cm, .   A PET scan obtained on 12/03/19 showed FDG uptake in the RUL nodule as well as 1R/1L nodes, bilateral mediastinal and right hilar node. Left adrenal gland was hypermetabolic, c/w metastasis.  On 12/17/19 CT-guided biopsy of the left adrenal gland confirmed the presence of a mucinous adenocarcinoma, TTF-1 positive, Napsin A and CK7 also positive. PD-L1 TPS < 1%, with no actionable mutations, TP53 mutant.   MRI brain 12/22/19 showed a 2 mm enhancement in left temporal lobe that is nonspecific but could be an additional metastatic site.   01/15/20: cycle # 1  carbo/pemetrexed/pembrolizumab   03/18/20: Cycle # 4 carbo/pemetrexed/pembrolizumab   04/08/20: starts maintenance pemetrexed/pembrolizumab   05/26/20: MRI brain shows no intracranial disease  05/26/20: CT C/A/P: increase in size of RUL mass - now measuring ~ 3.2 cm and stability of left adrenal gland - referred to Rad Onc for consideration of SBRT - continues pemetrexed/pembrolizumab maintenance   08/19/20:  completes RT to the RUL  08/20/20: grade 1 rash b/l UEs - treated with topical triamcinolone - continue pemetrexed + pembrolizumab  10/16/20: CT C/A/P: Increased consolidative changes  in the RUL c/w RT-induced fibrosis - no LAD. The adrenal glands look stable - the multiple lung GG opacifications are stable   11/27/20: CT C/A/P - improvement on RUL interstitial changes - there is no sign of PD. The RUL mass has decreased in size from 7 to 5 cm in greatest diameter - left adrenal gland is stable in size.   02/19/21: CT C/A/P personally reviewed by me: the RUL apical lesion that has been irradiated is further  decreased in size, now measuring 3.9 cm x 2 cm and previously on 11/24/21 measuring 4.8 cm x 2.4 cm (solid component). The left adrenal mass measures  3.4 cm and is stable in size. There are no new concerning metastatic lesions,. Multiple areas of GGO remain and are of no clinical significance in this patients with metastatic disease.   05/14/21: CT Chest/abdomen/pelvis personally reviewed by me. The RUL mass remains stable and so do the nodes. However, the GGO opacifications persist - the left adrenal gland is slightly smaller (3.6 cm << 3.8 cm). The right adrenal gland remains stable.      6/30/21: Will continue maintenance pembrolizumab and pemetrexed. Given CrCl, will dose Pemetrexed at 375 mg/m2 today.      08/06/21: CT C/A/P shows further development of interstitial changes in the RUL that look purely inflammatory in nature - there are no concerns for PD - no worsening LAD, the left adrenal mass  remains stable at 3.6 cm. This adrenal gland has been biopsied  at her dx      12/09/21: CT C/A/P shows SD - the RUL changes from prior RT remain stable - no new emerging LAD. Adrenal glands stable      12/15/21: dopplers LE negative for DVT      03/04/2022: CT Chest/Abdomen/Pelvis shows no signs of PD with the exception of left adrenal gland that continues to measure 3.4 cm however has developed an area of necrosis that is  concerning. Both RUL and LLL GGO remain stable measuring 2.7 cm and 2.8 cm respectively      03/25/2022: PET scan demonstrates close to metabolic complete remission, left adrenal gland SUV has decreased from 6.4 to 2.2     4/4/22-4/13/22: Completed SBRT to left adrenal gland.     06/17/2022: CT Chest/Abdomen/Pelvis demonstrates stable findings, stable size of left adrenal gland, stable GGO throughout bilateral lung fields      09/09/2022: CT CAP shows stable disease including stability of L adrenal gland; concern in liver noted on read reviewed and more consistent with fatty infiltration also seen on previous scans      10/07/2022: MRI brain demonstrates no intracranial metastasis.  Findings compatible with mastoiditis but subsequent CT scan ruled out mastoiditis, with a finding of osteopenia.     12/02/2022: CT CAP shows stable disease within the lung, L adrenal gland. Also noted stable pancreatic head & uncinate process lesions thought to represent IPMNs      02/20/2023: CT chest/abdomen/pelvis personally reviewed by me, demonstrating stable changes in the lungs as well as adrenal glands.  No new metastatic sites demonstrated.      03/01/2023: Discussion with patient regards to risks and benefits of continued single agent pembrolizumab in view of her issues with declining GFR associated with pemetrexed infusions.  Decision made to continue on single agent pembrolizumab every 3 weeks  for now     05/18/2023: CT CAP show stable disease without new metastatic sites      08/02/2023: Last  pembrolizumab dose    2023: CT chest/abdomen/pelvis demonstrating stability of findings with no new metastatic sites   Left adrenal measuring 3 cm in greatest diameter     PAST MEDICAL HISTORY  Asthma  HTN  Hypothyroidism   HLD  Glaucoma   Migraines     SURGICAL HISTORY  Anal fissures in her 30's  [age]illectomy 4/6 yo     SOCIAL HISTORY  Smoked 0.5 ppd from ages [age]  Drinks red wine daily   Plays tennis 3 x/week - doubles  Retired  - worked at [employer] and   Speaks 4 languages (Filipino, Swiss, [place] and English)        FAMILY HISTORY  Maternal aunt  of metastatic cancer to the liver   Has one sister at age 84 who has Parkinson's  One sister  at age 73 of cirrhosis     CURRENT MEDS REVIEWED    ALLERGIES REVIEWED      SUBJECTIVE:  Patient here today with her  - transitioning care from Dr. Alva. Overall she is feeling relatively well. Dealing with some side effects related to her CLL treatment, but hoping these are starting to settle out. Include mouth sores and vulvar edema/erythema. Her breathing feels good and she denies any other new concerns.    A 13 point review of systems was performed, with significant findings documented above in subjective history.    OBJECTIVE:  Vitals:    24 0954   BP: 134/55   Pulse: 100   Resp: 16   Temp: 35.8 °C (96.4 °F)   SpO2: 100%      Body surface area is 1.58 meters squared.     Wt Readings from Last 5 Encounters:   24 55.6 kg (122 lb 9.2 oz)   10/28/24 55.2 kg (121 lb 11.1 oz)   10/17/24 55.5 kg (122 lb 5.7 oz)   10/07/24 56.3 kg (124 lb 1.9 oz)   24 56.4 kg (124 lb 5.4 oz)     ECOGSCORE: 0- Fully active, able to carry on all pre-disease performance w/o restriction.    Physical Exam  Constitutional:       Appearance: Normal appearance. She is normal weight.   HENT:      Head: Normocephalic and atraumatic.   Eyes:      General: No scleral icterus.     Extraocular Movements: Extraocular movements intact.      Conjunctiva/sclera:  Conjunctivae normal.   Cardiovascular:      Rate and Rhythm: Normal rate and regular rhythm.      Heart sounds: Normal heart sounds.   Pulmonary:      Effort: Pulmonary effort is normal.      Breath sounds: Normal breath sounds.   Abdominal:      General: Abdomen is flat.      Palpations: Abdomen is soft. There is no mass.      Tenderness: There is no abdominal tenderness. There is no guarding.   Musculoskeletal:      Cervical back: Neck supple.   Skin:     General: Skin is warm and dry.   Neurological:      General: No focal deficit present.      Mental Status: She is alert and oriented to person, place, and time. Mental status is at baseline.      Motor: No weakness.      Gait: Gait normal.   Psychiatric:         Mood and Affect: Mood normal.         Behavior: Behavior normal.         Thought Content: Thought content normal.         Judgment: Judgment normal.          Diagnostic Results   Results:  Labs:  Lab Results   Component Value Date    .8 (HH) 10/28/2024    HGB 10.3 (L) 10/28/2024    HCT 33.7 (L) 10/28/2024    MCV 90 10/28/2024     (L) 10/28/2024      Lab Results   Component Value Date    NEUTROABS 5.84 (H) 10/28/2024        Lab Results   Component Value Date    GLUCOSE 101 (H) 10/28/2024    CALCIUM 9.0 10/28/2024     10/28/2024    K 4.0 10/28/2024    CO2 27 10/28/2024     10/28/2024    BUN 23 10/28/2024    CREATININE 0.92 10/28/2024    MG 1.92 05/22/2024     Lab Results   Component Value Date    ALT 9 10/28/2024    AST 14 10/28/2024    ALKPHOS 75 10/28/2024    BILITOT 0.4 10/28/2024      Lab Results   Component Value Date    ACTH 36.0 05/22/2024    CORTISOL 15.0 05/22/2024    TSH 0.81 05/22/2024    FREET4 1.26 09/18/2023     Imaging:    I personally reviewed the below imaging and concur with the findings as reported unless otherwise stated      === 11/04/24 ===    CT CHEST ABDOMEN PELVIS W IV CONTRAST    - Impression -  Non-small-cell lung cancer and CLL restaging scan, comparison  to  prior CT from May 2024:  1. Overall tumor burden remains stable with no new site of metastasis  identified. Stable postradiation changes in the lung. Stable  bilateral adrenal nodules.  2. The pancreatic volume has decreased by 50% when compared to  multiple prior CTs dating back to November 2019., to be correlated  for pancreatic insufficiency.  3. Stable splenomegaly, compatible with history of CLL. No new  lymphadenopathy above or below the diaphragm.  4. Increased endometrial thickening, correlation with pelvic  ultrasound is recommended.      I personally reviewed the images/study and I agree with the findings  as stated by Isabelle Greene MD. This study was interpreted at  [place], [place].    MACRO:  None    Signed by: Anthony Simpson 11/4/2024 1:31 PM  Dictation workstation:   [id number]    I also reviewed prior images included CT chest/abd/pelvis from 5/2024 and 2/2024.     Assessment/Plan   Stage CLIFTON NSCLC - metastasis to the left adrenal gland  Remains off immunotherapy since August 2023 with no signs or concerns for progression of her disease.  I personally reviewed her scans from November 2024 and they demonstrate no concerning findings  Will be repeating them in 3 months, with a follow-up subsequent to that  Knows to call and reach out if any new symptoms or concerns related to possible progression of her cancer develop, such as unintentional weight loss, fatigue, new onset respiratory symptoms, localized pain.   - Last MRI brain 10/2022, will discuss repeating at next visit though asymptomatic also reasonable to monitor clinically    She is being treated for CLL by Dr. Sravani Huang.     Anxiety   - meditating, doing Yoga, Des and saleem Chi at [facility]  - uses Xanax as needed.      Endometrial thickening - further thickening noted on recent CT  - Will refer for pelvic ultrasound  - Refer to GYN    RTC in 3m with repeat scan prior    Karma  MD Fernando  [facility]

## 2024-11-11 ENCOUNTER — TELEPHONE (OUTPATIENT)
Dept: ADMISSION | Facility: HOSPITAL | Age: 79
End: 2024-11-11
Payer: MEDICARE

## 2024-11-11 ENCOUNTER — TELEPHONE (OUTPATIENT)
Dept: RADIATION ONCOLOGY | Facility: HOSPITAL | Age: 79
End: 2024-11-11
Payer: MEDICARE

## 2024-11-11 ENCOUNTER — APPOINTMENT (OUTPATIENT)
Dept: RADIOLOGY | Facility: HOSPITAL | Age: 79
End: 2024-11-11
Payer: MEDICARE

## 2024-11-11 DIAGNOSIS — F41.9 ANXIETY: ICD-10-CM

## 2024-11-11 RX ORDER — ALPRAZOLAM 0.5 MG/1
0.5 TABLET ORAL 3 TIMES DAILY PRN
Qty: 90 TABLET | Refills: 0 | Status: SHIPPED | OUTPATIENT
Start: 2024-11-11 | End: 2024-12-11

## 2024-11-11 NOTE — TELEPHONE ENCOUNTER
Called pt to remind of appointment on 11/12/2024 at 3:00. Pt's phone went to voicemail left number if needs to reschedule.      Pt is aware that the appointment is a phone/virtual visit, pt. understands that he/she doesn't have to show up in office.

## 2024-11-11 NOTE — TELEPHONE ENCOUNTER
Pt is requesting a refill of alprazolam 0.5mg TID prn #90.  Last prescribed 10/1/24.  Preferred pharmacy is [facility].

## 2024-11-12 ENCOUNTER — HOSPITAL ENCOUNTER (OUTPATIENT)
Dept: RADIATION ONCOLOGY | Facility: HOSPITAL | Age: 79
Setting detail: RADIATION/ONCOLOGY SERIES
Discharge: HOME | End: 2024-11-12
Payer: MEDICARE

## 2024-11-12 DIAGNOSIS — C34.91 PRIMARY MALIGNANT NEOPLASM OF RIGHT LUNG METASTATIC TO OTHER SITE (MULTI): Primary | ICD-10-CM

## 2024-11-12 DIAGNOSIS — E27.8 ADRENAL MASS (MULTI): ICD-10-CM

## 2024-11-12 PROCEDURE — 99213 OFFICE O/P EST LOW 20 MIN: CPT | Mod: 95 | Performed by: NURSE PRACTITIONER

## 2024-11-12 PROCEDURE — 99213 OFFICE O/P EST LOW 20 MIN: CPT | Performed by: NURSE PRACTITIONER

## 2024-11-12 PROCEDURE — 99417 PROLNG OP E/M EACH 15 MIN: CPT | Mod: 95 | Performed by: NURSE PRACTITIONER

## 2024-11-12 ASSESSMENT — ENCOUNTER SYMPTOMS
WHEEZING: 0
NEUROLOGICAL NEGATIVE: 1
SHORTNESS OF BREATH: 0
FATIGUE: 0
HEMATOLOGIC/LYMPHATIC NEGATIVE: 1
ACTIVITY CHANGE: 0
CARDIOVASCULAR NEGATIVE: 1
UNEXPECTED WEIGHT CHANGE: 0
MUSCULOSKELETAL NEGATIVE: 1
COUGH: 0
FEVER: 0
DIAPHORESIS: 0
CHILLS: 0
PSYCHIATRIC NEGATIVE: 1
RESPIRATORY NEGATIVE: 1
APPETITE CHANGE: 0

## 2024-11-12 NOTE — PROGRESS NOTES
Patient ID: [id number]   Cancer Staging:          Lung         AJCC Edition: 8th (AJCC), Diagnosis Date: 17-Dec-2019, CLIFTON, cT2a cN3 cM1b      Treatment Synopsis:    Mrs. Springer is a 79 yo female, former smoker (quit 25 years prior to dx)who presented with  new onset respiratory sx (cough), decreased appetite and weight loss (lost 13 lbs in 2 months).   Chest CT 11/07/19 showed a RUL nodule measuring 4 cm, mediastinal adenopathy (level 2L measuring 1.8 cm, .   A PET scan obtained on 12/03/19 showed FDG uptake in the RUL nodule as well as 1R/1L nodes, bilateral mediastinal and right hilar node. Left adrenal gland was hypermetabolic, c/w metastasis.  On 12/17/19 CT-guided biopsy of the left adrenal gland confirmed the presence of a mucinous adenocarcinoma, TTF-1 positive, Napsin A and CK7 also positive. PD-L1 TPS < 1%, with no actionable mutations, TP53 mutant.   MRI brain 12/22/19 showed a 2 mm enhancement in left temporal lobe that is nonspecific but could be an additional metastatic site.   01/15/20: cycle # 1 carbo/pemetrexed/pembrolizumab   03/18/20: Cycle # 4 carbo/pemetrexed/pembrolizumab   04/08/20: starts maintenance pemetrexed/pembrolizumab   05/26/20: MRI brain shows no intracranial disease  05/26/20: CT C/A/P: increase in size of RUL mass - now measuring ~ 3.2 cm and stability of left adrenal gland - referred to Rad Onc for consideration of SBRT - continues pemetrexed/pembrolizumab maintenance   08/19/20:  completes RT to the RUL  08/20/20: grade 1 rash b/l UEs - treated with topical triamcinolone - continue pemetrexed + pembrolizumab  10/16/20: CT C/A/P: Increased consolidative changes  in the RUL c/w RT-induced fibrosis - no LAD. The adrenal glands look stable - the multiple lung GG opacifications are stable   11/27/20: CT C/A/P - improvement on RUL interstitial changes - there is no sign of PD. The RUL mass has decreased in size from 7 to 5 cm in greatest diameter - left adrenal gland is stable in size.    02/19/21: CT C/A/P personally reviewed by me: the RUL apical lesion that has been irradiated is further  decreased in size, now measuring 3.9 cm x 2 cm and previously on 11/24/21 measuring 4.8 cm x 2.4 cm (solid component). The left adrenal mass measures  3.4 cm and is stable in size. There are no new concerning metastatic lesions,. Multiple areas of GGO remain and are of no clinical significance in this patients with metastatic disease.   05/14/21: CT Chest/abdomen/pelvis personally reviewed by me. The RUL mass remains stable and so do the nodes. However, the GGO opacifications persist - the left adrenal gland is slightly smaller (3.6 cm << 3.8 cm). The right adrenal gland remains stable.      6/30/21: Will continue maintenance pembrolizumab and pemetrexed. Given CrCl, will dose Pemetrexed at 375 mg/m2 today.      08/06/21: CT C/A/P shows further development of interstitial changes in the RUL that look purely inflammatory in nature - there are no concerns for PD - no worsening LAD, the left adrenal mass remains stable at 3.6 cm. This adrenal gland has been biopsied  at her dx      12/09/21: CT C/A/P shows SD - the RUL changes from prior RT remain stable - no new emerging LAD. Adrenal glands stable      12/15/21: dopplers LE negative for DVT      03/04/2022: CT Chest/Abdomen/Pelvis shows no signs of PD with the exception of left adrenal gland that continues to measure 3.4 cm however has developed an area of necrosis that is concerning. Both RUL and LLL GGO remain stable measuring 2.7 cm and 2.8  cm respectively      03/25/2022: PET scan demonstrates close to metabolic complete remission, left adrenal gland SUV has decreased from 6.4 to 2.2     4/4/22-4/13/22: Completed SBRT to left adrenal gland.     Last pembrolizumab dose on 08/02/2023 due to elevated WBC    History of presenting illness    Telehealth visit with Ms. Springer today. Patient is a 78 year old female who presents today for follow up 2 years and 7 months  s/p RT to the left adrenal gland. Pembro was discontinued due to elevated WBC count. There are no plans to resume. She was diagnosed with CLL and is on treatment with Dr. Huang. Patient states she feels good.  Energy is baseline. Appetite good. Weight stable.  Breathing is baseline. Denies new cough, chest pain, back pain, fevers, night sweats or worsening SOB. No neurological  complaints.  Recent CT CAP was stable from lung/adrenal gland standpoint. It did show an increased endometrial thickening. She is scheduled for an US on 11/19/24. First available appointment with OBGYN is mid December. Asking if ok to push US  to appointment. Next set of imaging and follow up with Dr. King is planned for February.   Review of systems:  Review of Systems   Constitutional:  Negative for activity change, appetite change, chills, diaphoresis, fatigue, fever and unexpected weight change.   HENT: Negative.     Respiratory: Negative.  Negative for cough, shortness of breath and wheezing.    Cardiovascular: Negative.    Musculoskeletal: Negative.    Skin: Negative.    Neurological: Negative.    Hematological: Negative.    Psychiatric/Behavioral: Negative.         Past Medical history  She  has a past surgical history that includes Tonsillectomy (08/01/2014) and Other surgical history (10/12/2015).        Radiology results:  CT chest abdomen pelvis w IV contrast 11/04/2024    Narrative  Interpreted By:  Anthony Simpson and Maltbie Grace  STUDY:  CT CHEST ABDOMEN PELVIS W IV CONTRAST;  11/4/2024 9:13 am    INDICATION:  Signs/Symptoms:re-staging metastatic NSCLC off sytemic therapy.    ,C34.11 Malignant neoplasm of upper lobe, right bronchus or lung    COMPARISON:  CT chest abdomen pelvis 05/24/2024 and multiple CTs dating back to  11/07/2019 PET-CT 03/25/2022    ACCESSION NUMBER(S):  [id number]    ORDERING CLINICIAN:  SALO THOMPSON    TECHNIQUE:  CT of the chest, abdomen, and pelvis was performed.  Contiguous  axial  images were obtained at 3 mm slice thickness through the chest,  abdomen and pelvis. Coronal and sagittal reconstructions at 3 mm  slice thickness were performed. 75 ML of Omnipaque 350 was  administered intravenously without immediate complication.    FINDINGS:  CHEST:    LUNG/PLEURA/LARGE AIRWAYS:  Trachea and right and left main bronchi are patent. No pleural  effusion. Linear atelectasis of the lingula.    Stable anterior right upper lobe consolidative opacification,  compatible with postradiation changes. Scattered ground-glass  opacities remain stable, for example a 24 mm ground-glass nodule  right upper lobe on series 3, image 90; 21 mm ground-glass nodule in  the left upper lobe on series 3, image 172; and a 29 mm left lower  lobe ground-glass nodule with central lucencies on series 3, image  189. Additional stable subsolid 10 mm nodule is seen in the left  lower lobe on series 3, image 22; and a stable 4 mm nodule seen in  the right middle lobe on series 3, image 108. No new or enlarging  lung nodule.    VESSELS:  Aorta and pulmonary arteries are normal caliber. Mild atherosclerotic  changes of the aorta are identified. Mild coronary artery  calcifications are present.    HEART:  The heart is normal in size. No pericardial effusion    MEDIASTINUM AND MATTHEW:  No mediastinal, hilar or axillary lymphadenopathy is present. The  esophagus is normal.    CHEST WALL AND LOWER NECK:  No soft tissue masses in the chest wall. The visualized thyroid gland  appears within normal limits.    ABDOMEN:    LIVER:  The liver is normal in size without evidence of focal liver lesions.    BILE DUCTS:  No biliary dilatation.    GALLBLADDER:  No calcified stones. No wall thickening.    PANCREAS:  The pancreatic volume has decreased by 50% when compared to multiple  prior CTs dating back to November 2019. Incidental note is made of  truncated pancreas, an anatomic variation. No pancreatic mass or  ductal  dilatation.    SPLEEN:  Stable splenomegaly, measuring 15.3 cm in the craniocaudal dimension.    ADRENAL GLANDS:  Stable 32 x 12 mm left adrenal nodule on series 2, image 104.  Stable 26 x 12 mm right adrenal nodule on series 2, image 96.    KIDNEYS AND URETERS:  Lobulated contour of the bilateral kidneys is again seen. No  hydroureteronephrosis or nephroureterolithiasis is identified.    PELVIS:    BLADDER:  Within normal limits.    REPRODUCTIVE ORGANS:  Fibroid uterus with increased 30 mm endometrial thickening,  previously 24 mm. Stable 7.2 x 5.6 cm right adnexal mass with coarse  calcifications, likely a fibroma.    BOWEL:  The stomach is unremarkable. The small and large bowel are normal in  caliber and demonstrate no wall thickening. Normal appendix. Colonic  diverticulosis without acute diverticulitis.    VESSELS:  Atherosclerotic calcifications of the aortoiliac arteries.  The IVC appears normal.  Portal vein, splenic vein, and SMV are patent.    PERITONEUM/RETROPERITONEUM/LYMPH NODES:  No ascites or fluid collection.  No peritoneal nodularity or deposits.  No abdominopelvic lymphadenopathy is present.    BONE AND SOFT TISSUE:  No suspicious osseous lesions are identified. No soft tissue masses  in the abdominal wall.    Impression  Non-small-cell lung cancer and CLL restaging scan, comparison to  prior CT from May 2024:  1. Overall tumor burden remains stable with no new site of metastasis  identified. Stable postradiation changes in the lung. Stable  bilateral adrenal nodules.  2. The pancreatic volume has decreased by 50% when compared to  multiple prior CTs dating back to November 2019., to be correlated  for pancreatic insufficiency.  3. Stable splenomegaly, compatible with history of CLL. No new  lymphadenopathy above or below the diaphragm.  4. Increased endometrial thickening, correlation with pelvic  ultrasound is recommended.      I personally reviewed the images/study and I agree with the  findings  as stated by Isabelle Greene MD. This study was interpreted at  [facility], [place], [place].    MACRO:  None    Signed by: Anthony Simpson 11/4/2024 1:31 PM  Dictation workstation:   [id number]     Plan:  Assessment/Plan   78 year old female 2 years and 7 months s/p RT to the left adrenal gland for metastatic lung cancer. Patient is doing well. No acute complaints related to treatment. CT CAP from 11/4/24 is stable from lung and adrenal standpoint. Scan showed endometrial thickening. She is scheduled for US and OBGYN visit. Will help her push back US closer to follow up appointment. No longer on immunotherapy due to elevated WBC counts.  Continue imaging and follow up with Dr. King as scheduled. Patient recently diagnosed with CLL. Continue systemic therapy and follow up with Dr. Huang as scheduled. Patient to return to clinic in 6 months unless needs to be seen sooner. Instructed  to call with any questions or concerns.       I performed this visit using real- time telehealth tools , including an audio/video or telephone connection between Herlinda Springer  , who is in their home and Tameka Candelaria CNP at [facility].

## 2024-11-13 ENCOUNTER — APPOINTMENT (OUTPATIENT)
Dept: INTEGRATIVE MEDICINE | Facility: CLINIC | Age: 79
End: 2024-11-13
Payer: MEDICARE

## 2024-11-19 ENCOUNTER — APPOINTMENT (OUTPATIENT)
Dept: RADIOLOGY | Facility: HOSPITAL | Age: 79
End: 2024-11-19
Payer: MEDICARE

## 2024-11-27 ENCOUNTER — LAB (OUTPATIENT)
Dept: LAB | Facility: HOSPITAL | Age: 79
End: 2024-11-27
Payer: MEDICARE

## 2024-11-27 ENCOUNTER — TELEPHONE (OUTPATIENT)
Dept: ADMISSION | Facility: HOSPITAL | Age: 79
End: 2024-11-27
Payer: MEDICARE

## 2024-11-27 DIAGNOSIS — C91.10 CLL (CHRONIC LYMPHOCYTIC LEUKEMIA) (MULTI): ICD-10-CM

## 2024-11-27 DIAGNOSIS — C34.11 PRIMARY MALIGNANT NEOPLASM OF RIGHT UPPER LOBE OF LUNG (MULTI): ICD-10-CM

## 2024-11-27 DIAGNOSIS — C34.11 CANCER OF UPPER LOBE OF RIGHT LUNG (MULTI): ICD-10-CM

## 2024-11-27 LAB
ALBUMIN SERPL BCP-MCNC: 4.2 G/DL (ref 3.4–5)
ALP SERPL-CCNC: 61 U/L (ref 33–136)
ALT SERPL W P-5'-P-CCNC: 10 U/L (ref 7–45)
ANION GAP SERPL CALC-SCNC: 13 MMOL/L (ref 10–20)
AST SERPL W P-5'-P-CCNC: 13 U/L (ref 9–39)
BASOPHILS # BLD AUTO: 0.08 X10*3/UL (ref 0–0.1)
BASOPHILS NFR BLD AUTO: 0.1 %
BILIRUB SERPL-MCNC: 0.4 MG/DL (ref 0–1.2)
BUN SERPL-MCNC: 19 MG/DL (ref 6–23)
BURR CELLS BLD QL SMEAR: NORMAL
CALCIUM SERPL-MCNC: 8.6 MG/DL (ref 8.6–10.3)
CHLORIDE SERPL-SCNC: 104 MMOL/L (ref 98–107)
CO2 SERPL-SCNC: 27 MMOL/L (ref 21–32)
CREAT SERPL-MCNC: 0.98 MG/DL (ref 0.5–1.05)
EGFRCR SERPLBLD CKD-EPI 2021: 58 ML/MIN/1.73M*2
EOSINOPHIL # BLD AUTO: 0.45 X10*3/UL (ref 0–0.4)
EOSINOPHIL NFR BLD AUTO: 0.3 %
ERYTHROCYTE [DISTWIDTH] IN BLOOD BY AUTOMATED COUNT: 15.9 % (ref 11.5–14.5)
GLUCOSE SERPL-MCNC: 93 MG/DL (ref 74–99)
HCT VFR BLD AUTO: 34.2 % (ref 36–46)
HGB BLD-MCNC: 10.1 G/DL (ref 12–16)
IMM GRANULOCYTES # BLD AUTO: 0.43 X10*3/UL (ref 0–0.5)
IMM GRANULOCYTES NFR BLD AUTO: 0.3 % (ref 0–0.9)
LDH SERPL L TO P-CCNC: 117 U/L (ref 84–246)
LYMPHOCYTES # BLD AUTO: 150.54 X10*3/UL (ref 0.8–3)
LYMPHOCYTES NFR BLD AUTO: 94.7 %
MAGNESIUM SERPL-MCNC: 1.9 MG/DL (ref 1.6–2.4)
MCH RBC QN AUTO: 27.4 PG (ref 26–34)
MCHC RBC AUTO-ENTMCNC: 29.5 G/DL (ref 32–36)
MCV RBC AUTO: 93 FL (ref 80–100)
MONOCYTES # BLD AUTO: 3.17 X10*3/UL (ref 0.05–0.8)
MONOCYTES NFR BLD AUTO: 2 %
NEUTROPHILS # BLD AUTO: 4.36 X10*3/UL (ref 1.6–5.5)
NEUTROPHILS NFR BLD AUTO: 2.6 %
NRBC BLD-RTO: 0 /100 WBCS (ref 0–0)
OVALOCYTES BLD QL SMEAR: NORMAL
PLATELET # BLD AUTO: 133 X10*3/UL (ref 150–450)
POTASSIUM SERPL-SCNC: 4 MMOL/L (ref 3.5–5.3)
PROT SERPL-MCNC: 6.6 G/DL (ref 6.4–8.2)
RBC # BLD AUTO: 3.68 X10*6/UL (ref 4–5.2)
RBC MORPH BLD: NORMAL
SODIUM SERPL-SCNC: 140 MMOL/L (ref 136–145)
URATE SERPL-MCNC: 5.1 MG/DL (ref 2.3–6.7)
WBC # BLD AUTO: 159 X10*3/UL (ref 4.4–11.3)

## 2024-11-27 PROCEDURE — 84075 ASSAY ALKALINE PHOSPHATASE: CPT

## 2024-11-27 PROCEDURE — 85025 COMPLETE CBC W/AUTO DIFF WBC: CPT

## 2024-11-27 PROCEDURE — 84550 ASSAY OF BLOOD/URIC ACID: CPT

## 2024-11-27 PROCEDURE — 36415 COLL VENOUS BLD VENIPUNCTURE: CPT

## 2024-11-27 PROCEDURE — 83735 ASSAY OF MAGNESIUM: CPT

## 2024-11-27 PROCEDURE — 83615 LACTATE (LD) (LDH) ENZYME: CPT

## 2024-11-27 NOTE — TELEPHONE ENCOUNTER
Patient reports had labs drawn today for upcoming appt on 12/2. . She reports taking Brukinsa for a little over 2 months. She is curious to know if this WBC result is an expected response to mediation or should it be lower than 159? She denies any concerns with medication, no swelling or enlarged lymph nodes, tolerating well.  FUV on 12/2

## 2024-12-01 NOTE — PROGRESS NOTES
Patient ID: Herlinda Springer is a 78 y.o. female.    Diagnosis:   Problem List Items Addressed This Visit       CLL (chronic lymphocytic leukemia) (Multi)     Treatment:   Oncology History Overview Note   Lymphocytosis 11/2023 CLL vs marginal zone lymphoma    Flow showing a CD5 positive, CD23 positive lymphoproliferative disorder, strong expression of CD20 and Gl829x and surface light chain atypical for CLL and MZL vs LPL was favored, who was referred by thoracic oncologist Dr Alva for workup of lymphocytosis. Patient has had very mild leukocytosis 12K since 3/2023, however increase to 22.3 in November with preserved hgb and platelets..     Additional database:     SPEP faint IgM kappa and free lambd lyte chsins too low to quantitate, B2 2.9,  ( nl)  IgH heavy chain, not mutated  NGS results:   DISEASE ASSOCIATED GENOMIC FINDINGS:   AUSTIN p.* (NM_[id number] c.3574A>T)  TP53 p.N239D (NM_000546 c.715A>G)  TP53 p.Q471Mkg*21 ([id number] c.305_306delinsA)     CT scans to followup on her lung cancer 2/26/24 shows no splenomegally and no lymphadenopathy     CLL (chronic lymphocytic leukemia) (Multi)   3/6/2024 Initial Diagnosis    CLL (chronic lymphocytic leukemia) (CMS/HCC)         Past Medical History:    Has been treated for high cholesterol     Past Medical History:   Diagnosis Date    Acute maxillary sinusitis, unspecified 09/04/2019    Acute non-recurrent maxillary sinusitis    Acute upper respiratory infection, unspecified 01/16/2015    Acute URI    Acute upper respiratory infection, unspecified 02/15/2018    Acute URI    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    Benign neoplasm of pituitary gland (Multi) 02/15/2018    Microprolactinoma    Encounter for other preprocedural examination     Preoperative clearance    Episodic paroxysmal hemicrania, 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    Paroxysmal hemicrania    Essential (primary) hypertension 02/15/2018    White coat syndrome with  hypertension    Laceration without foreign body of scalp, initial encounter 01/15/2014    Laceration of scalp    Lobar pneumonia, unspecified organism (CMS-HCC) 09/18/2019    Lobar pneumonia    Melanocytic nevi, unspecified 02/15/2018    Benign mole    Migraine with aura, not intractable, without status migrainosus 02/15/2018    Classic migraine with aura    Other conditions influencing health status 02/15/2018    History of cough    Other conditions influencing health status 11/21/2014    History of cough    Other conditions influencing health status 10/12/2015    No significant past surgical history    Other specified disorders of bone density and structure, unspecified site 02/15/2018    Osteopenia    Pain in left ankle and joints of left foot     Chronic pain of left ankle    Pain in left ankle and joints of left foot 05/11/2017    Acute left ankle pain    Pain in left knee     Acute pain of left knee    Pain in left knee 05/11/2017    Acute pain of left knee    Pain in left knee 02/15/2018    Acute pain of left knee    Personal history of other diseases of the musculoskeletal system and connective tissue 06/18/2015    History of muscle pain    Personal history of other diseases of the respiratory system 11/17/2014    History of acute sinusitis    Personal history of other diseases of the respiratory system 06/06/2018    History of acute bronchitis    Personal history of other diseases of the respiratory system 02/15/2018    History of acute sinusitis    Personal history of other endocrine, nutritional and metabolic disease 06/22/2016    History of vitamin D deficiency    Personal history of other specified conditions 09/16/2019    History of chronic cough    Personal history of other specified conditions 02/15/2018    History of shortness of breath    Personal history of other specified conditions 01/13/2016    History of shortness of breath    Preglaucoma, unspecified, bilateral 10/02/2015    Glaucoma suspect,  both eyes    Preglaucoma, unspecified, unspecified eye     Glaucoma suspect    Primary open-angle glaucoma, left eye, severe stage 10/26/2016    Primary open angle glaucoma of left eye, severe stage    Primary open-angle glaucoma, right eye, moderate stage 2022    Primary open angle glaucoma of right eye, moderate stage    Rupture of popliteal cyst 2015    Ruptured Bakers cyst    Rupture of popliteal cyst 02/15/2018    Ruptured Bakers cyst    Unspecified asthma with (acute) exacerbation ([facility]) 2019    Acute asthma exacerbation    Unspecified asthma with (acute) exacerbation ([facility]) 2018    Acute asthma exacerbation    Unspecified blepharitis left eye, unspecified eyelid 10/12/2015    Blepharitis of left eye    Unspecified glaucoma 02/15/2018    Glaucoma       Surgical History:     Past Surgical History:   Procedure Laterality Date    OTHER SURGICAL HISTORY  10/12/2015    Anal Fissurectomy    TONSILLECTOMY  2014    Tonsillectomy        Family History:   No cancers in family, one sister age 90, sister  in  after falling.   Family History   Problem Relation Name Age of Onset    Migraines Mother      Glaucoma Father          suspect       Social History:  Retired , worked as  at  and then at [employer], has been in [place] for 40 years,  for 45 yrs to second . no children. Light Former smoker as a younger adult, born in [place] emig[place] in .   Social History     Tobacco Use    Smoking status: Former     Types: Cigarettes   Vaping Use    Vaping status: Never Used   Substance Use Topics    Alcohol use: Never    Drug use: Never      -------------------------------------------------------------------------------------------------------  Subjective       Ms Springer is a 79 yo female h/o stage IV NSCLC (RUL mass, LT adrenal mets, mediastinal and cervical LN) s/p carbo/pem/pem (carbo given in , pembro finished 2023) and RT now with  stable disease (on surveillance), lymphocytosis with flow showing a CD5 positive, CD23 positive lymphoproliferative disorder,  strong expression of CD20 and Xi868o and surface light chain atypical for CLL and  MZL vs LPL was favored, who was referred by thoracic oncologist Dr Alva for workup of lymphocytosis. Patient has had very mild leukocytosis 12K since 3/2023, however increase to 22.3 in November prompted further evaluation. She has preserved hgb and platelets.     Additional database:     SPEP faint IgM kappa and free lambd lyte chsins too low to quantitate, B2 2.9,  ( nl)  IgH heavy chain, not mutated  NGS results:   DISEASE ASSOCIATED GENOMIC FINDINGS:   AUSTIN p.* (NM_000051 c.3574A>T)  TP53 p.N239D (NM_000546 c.715A>G)  TP53 p.L158Kem*21 ([id number] c.305_306delinsA)   CT scans to followup on her lung cancer 2/26/24 shows no splenomegally and no lymphadenopathy    However CBC from 10/4/24 shows a wbc of 99.6 up from 70.8, more concerning is hgb 10 down from 11  and platets of 122K  down from 176 compared to  8/13/24. No fevers, night sweats, weight loss. Normal appetite. No abdominal pain, normal bowel movements. No bleeding or bruising. No other concerns. Had labs done a week prior to visit today - Started zanubrutinib 10/10/24    10/17/24: Seen in [facility] for sick visit. Started Zanubrutinib on 10/10. Noticed urinary burning and vaginal inflammation/tenderness over past couple of days. Starting to feel better today. Also noticed intermittent tightness in chest, flank, abdomen. No N/V/D.     Herlinda presents to the clinic today (12/2/24) with her  for follow up evaluation of her CLL and count checks.  She continues taking Zanubruitinib 160 mg twice daily.  Receiving okay from  Specialty pharmacy.  She reports she is doing well and is feeling better.  Energy level is good.  Appetite is good.  Has gained a few pounds.  Continues to having intermittent mouth sores.  Not painful  and doesn't affect eating or drinking.  Rinses with warm salt water or baking soda.  Reports the mouth sores are getting better.  Can't feel any sores today.  Drinking liquid IV and has helped urinary burning.  No longer having pain to groin area.  Going to see GYN soon for evaluation.  Taking Senakot and metamucil to prevent constipation.  Has a few small bruises to forearm, face, and legs.      Review of Systems   Constitutional:  Negative for appetite change, chills, diaphoresis, fatigue, fever and unexpected weight change.   HENT:   Positive for mouth sores.    Respiratory: Negative.     Cardiovascular: Negative.    Gastrointestinal:  Positive for constipation.   Genitourinary:  Positive for dysuria. Negative for vaginal bleeding and vaginal discharge.    Musculoskeletal: Negative.    Skin: Negative.         bruising   Neurological: Negative.    Hematological:  Negative for adenopathy. Bruises/bleeds easily.   All other systems reviewed and are negative.  -------------------------------------------------------------------------------------------------------  Objective   BSA: 1.59 meters squared  /63 (BP Location: Right arm, Patient Position: Sitting, BP Cuff Size: Small adult)   Pulse 95   Temp 36.8 °C (98.2 °F) (Skin)   Resp 18   Wt 55.9 kg (123 lb 3.8 oz)   SpO2 100%   BMI 21.14 kg/m²       Physical Exam  Vitals reviewed.   Constitutional:       Appearance: Normal appearance.      Comments: Fit and well looks a decade younger than stated age   HENT:      Head: Normocephalic.      Nose: Nose normal.      Mouth/Throat:      Comments: Small sore on tip of tongue  Eyes:      Extraocular Movements: Extraocular movements intact.      Conjunctiva/sclera: Conjunctivae normal.      Pupils: Pupils are equal, round, and reactive to light.   Cardiovascular:      Rate and Rhythm: Normal rate and regular rhythm.   Pulmonary:      Effort: Pulmonary effort is normal.      Breath sounds: Normal breath sounds.    Chest:   Breasts:     Right: Normal.      Left: Normal.   Abdominal:      General: Abdomen is flat. Bowel sounds are normal.      Palpations: Abdomen is soft. There is no mass (spleen 2-3 cm BCM).   Musculoskeletal:         General: Normal range of motion.   Skin:     General: Skin is warm and dry.      Comments: Small eraser sized sized bruising to face, arms, and bilateral shins   Neurological:      General: No focal deficit present.      Mental Status: She is alert and oriented to person, place, and time.   Psychiatric:         Mood and Affect: Mood normal.         Behavior: Behavior normal.         Thought Content: Thought content normal.     Performance Status:  Asymptomatic  -------------------------------------------------------------------------------------------------------  Assessment/Plan      Ms Springer is a 77 yo female h/o stage [place] NSCLC (RUL mass, LT adrenal mets, mediastinal and cervical LN) s/p carbo/pem/pem (carbo given in 2021, pembro finished 8/2023) and RT (lung mass and adrenal mass) now with stable disease (on surveillance), new onset CD5, CD23 lymphocytosis with flow c/f MZL vs LPL, HTN, hypothyroidism, asthma, who was referred by thoracic oncologist Dr Alva for workup of lymphocytosis.     1 Atypical CLL    Lymphocytosis first noticed in 3/2023 at 12k, but increased to 22.3 in November 2023.  She has preserved hgb and platelets  and is assymptomatic. ALC doubled in 2023. New borderline splenomegaly on CT in 10/2023.  PB flow showed CD5+ clonal ppl, atypical for CLL, mainly concern for MZL vs LPL.  Additional database notable for TP 53 mutations, absence of MYD88 goes against LPL, so I suspect this is likely an atypical CLL.     According to IPSS score for asymptomatic CLL, she has high risk features in the form of IGHV unmutated and TP53x2 mutation, lymphocytosis>15,000    10/7/24  Patients wbc up to 96K, more concerning however is drop in hgb and platelets.  I have recommended starting  zanubrutinib which was started on 10/10/24.  Initially patient developed some  symptoms with vaginitis and some mouth sores, all of which seem to be improving.      10/29/24 WBC count increasing as expected and minor sore on tongue, confirmed that patient is taking her acyclovir and recommended bicarb mouth rinses.  Echymotic rash on shins but no significant bleeding. Patient reassured and will continue zanubrutinib at current dosing.      Patient continues supportive meds including acyclovir and allopurinol reviewed. Hepatitis serologies sent and are negative    12/2/24:  CBC results from 11/27/24 with slight decrease to WBC to 159.0, hgb 10.1, plt 133.  Herlinda continues to have mild intermittent mouth sores that are non-tender and do not affecting her oral intake.  Continues warm salt or baking soda rinses.  Has small eraser sized bruising to face, arms, and bilateral shins.  No signs of bleeding.  Reviewed CBC results and reassured that WBC count is trending down.  CT CAP (11/4/24) ordered by pulmonary showing stable splenomegaly, compatible with history of CLL, no new lymphadenopathy above or below the diaphragm.  Herlinda reports she was advised to stop allopurinol after 30 days.  Herlinda questioned if it was okay to start OTC ashwagandha for anxiety.  Advised to not take ashwagandha due to potential drug interactions.  Follow up visit in 1 month.      WBC   Date Value Ref Range Status   11/27/2024 159.0 (HH) 4.4 - 11.3 x10*3/uL Final   10/28/2024 169.8 () 4.4 - 11.3 x10*3/uL Final     Comment:     Previous result verified on 10/28/2024 1144 on specimen/case [id number] called with component WBC Auto for procedure CBC and Auto Differential with value 169.8 x10*3/uL.   10/17/2024 196.0 (HH) 4.4 - 11.3 x10*3/uL Final     Comment:     Previous result verified on 10/17/2024 1426 on specimen/case [id number] called with component WBC Auto for procedure CBC and Auto Differential with value 196.0 x10*3/uL.    10/01/2024 99.6 (HH) 4.4 - 11.3 x10*3/uL Final     Comment:     Previous result verified on 10/1/2024 1001 on specimen/case [id number] called with component WBC Auto for procedure CBC and Auto Differential with value 99.6 x10*3/uL.   08/13/2024 70.8 (HH) 4.4 - 11.3 x10*3/uL Final     Hemoglobin   Date Value Ref Range Status   11/27/2024 10.1 (L) 12.0 - 16.0 g/dL Final   10/28/2024 10.3 (L) 12.0 - 16.0 g/dL Final   10/17/2024 10.4 (L) 12.0 - 16.0 g/dL Final   10/01/2024 10.0 (L) 12.0 - 16.0 g/dL Final   08/13/2024 11.0 (L) 12.0 - 16.0 g/dL Final     2. NSCLC adenocarcinoma   - in remission, managed by thoracic oncologists  - scans in Nov 2024-  CT CAP (11/4/24)  IMPRESSION:  Non-small-cell lung cancer and CLL restaging scan, comparison to  prior CT from May 2024:  1. Overall tumor burden remains stable with no new site of metastasis  identified. Stable postradiation changes in the lung. Stable  bilateral adrenal nodules.  2. The pancreatic volume has decreased by 50% when compared to  multiple prior CTs dating back to November 2019., to be correlated  for pancreatic insufficiency.  3. Stable splenomegaly, compatible with history of CLL. No new  lymphadenopathy above or below the diaphragm.  4. Increased endometrial thickening, correlation with pelvic  ultrasound is recommended  12/2/24:  Following with Dr. King, last seen 11/6/24.  Dr. King's note states there is no concerning findings on imaging, with plans to have repeat CT scan in 3 months.  May plan to order MRI brain at next visit vs monitoring.  Referral sent for GYN and ordered pelvic ultrasound.      3. Health maintenance:  Received 9/7/24 COVID, 9/16/24 RSV, and 9/21/24 influenza vaccine.  Mammogram (8/2/24): no mammographic evidence of malignancy.    RTC:    Follow up visit with provider in 1 month.  Advised to obtain blood work prior to visit.     -------------------------------------------------------------------------------------------------------  JOELLE Rod

## 2024-12-02 ENCOUNTER — OFFICE VISIT (OUTPATIENT)
Dept: HEMATOLOGY/ONCOLOGY | Facility: HOSPITAL | Age: 79
End: 2024-12-02
Payer: MEDICARE

## 2024-12-02 ENCOUNTER — ONCOLOGY MEDICATION OUTREACH (OUTPATIENT)
Dept: HEMATOLOGY/ONCOLOGY | Facility: HOSPITAL | Age: 79
End: 2024-12-02
Payer: MEDICARE

## 2024-12-02 ENCOUNTER — SPECIALTY PHARMACY (OUTPATIENT)
Dept: PHARMACY | Facility: CLINIC | Age: 79
End: 2024-12-02

## 2024-12-02 VITALS
TEMPERATURE: 98.2 F | DIASTOLIC BLOOD PRESSURE: 63 MMHG | WEIGHT: 123.24 LBS | BODY MASS INDEX: 21.14 KG/M2 | SYSTOLIC BLOOD PRESSURE: 136 MMHG | RESPIRATION RATE: 18 BRPM | OXYGEN SATURATION: 100 % | HEART RATE: 95 BPM

## 2024-12-02 DIAGNOSIS — C34.90 PRIMARY MALIGNANT NEOPLASM OF LUNG METASTATIC TO OTHER SITE, UNSPECIFIED LATERALITY (MULTI): ICD-10-CM

## 2024-12-02 DIAGNOSIS — F41.9 ANXIETY: ICD-10-CM

## 2024-12-02 DIAGNOSIS — C91.10 CLL (CHRONIC LYMPHOCYTIC LEUKEMIA) (MULTI): Primary | ICD-10-CM

## 2024-12-02 PROCEDURE — 1159F MED LIST DOCD IN RCRD: CPT

## 2024-12-02 PROCEDURE — 1160F RVW MEDS BY RX/DR IN RCRD: CPT

## 2024-12-02 PROCEDURE — 3075F SYST BP GE 130 - 139MM HG: CPT

## 2024-12-02 PROCEDURE — 99214 OFFICE O/P EST MOD 30 MIN: CPT

## 2024-12-02 PROCEDURE — 3078F DIAST BP <80 MM HG: CPT

## 2024-12-02 PROCEDURE — RXMED WILLOW AMBULATORY MEDICATION CHARGE

## 2024-12-02 PROCEDURE — 1126F AMNT PAIN NOTED NONE PRSNT: CPT

## 2024-12-02 ASSESSMENT — ENCOUNTER SYMPTOMS
FEVER: 0
CONSTIPATION: 1
FATIGUE: 0
DIAPHORESIS: 0
CHILLS: 0
UNEXPECTED WEIGHT CHANGE: 0
ROS SKIN COMMENTS: BRUISING
CARDIOVASCULAR NEGATIVE: 1
ADENOPATHY: 0
NEUROLOGICAL NEGATIVE: 1
BRUISES/BLEEDS EASILY: 1
RESPIRATORY NEGATIVE: 1
APPETITE CHANGE: 0
DYSURIA: 1
MUSCULOSKELETAL NEGATIVE: 1

## 2024-12-02 ASSESSMENT — PAIN SCALES - GENERAL: PAINLEVEL_OUTOF10: 0-NO PAIN

## 2024-12-02 NOTE — PROGRESS NOTES
[facility] Clinical Note    Herlinda Springer is a 78 y.o. female, who is on the specialty pharmacy service for management of: Oncology Core with status of: (Enrolled)     Herlinda was contacted on 12/2/2024.    Refer to the encounter summary report for documentation details about patient counseling and education.      Medication Adherence  The importance of adherence was discussed with the patient and they were advised to take the medication as prescribed by their provider. Herlinda was encouraged to call her physician's office if they have a question regarding a missed dose.       Patient advised to contact the pharmacy if there are any changes to her medication list, including prescriptions, OTC medications, herbal products, or supplements. Patient was advised of [facility]’s dispensing process, refill timeline, contact information ([phone]), and patient management follow up. Patient confirmed understanding of education conducted during assessment. All patient questions and concerns were addressed to the best of my ability. Patient was encouraged to contact the specialty pharmacy with any questions or concerns.    Confirmed follow-up outreaches are properly scheduled. Reviewed goals of therapy in the program targets.     Pt enquiring about ashwaganda- advised that if alprazolam is still working it would be best to not start. There is a potential interaction with the herbal that could increase brukinsa concentration.    Davi Muniz, PharmD

## 2024-12-03 ENCOUNTER — SPECIALTY PHARMACY (OUTPATIENT)
Dept: PHARMACY | Facility: CLINIC | Age: 79
End: 2024-12-03

## 2024-12-04 ENCOUNTER — PHARMACY VISIT (OUTPATIENT)
Dept: PHARMACY | Facility: CLINIC | Age: 79
End: 2024-12-04
Payer: MEDICARE

## 2024-12-10 ENCOUNTER — HOSPITAL ENCOUNTER (OUTPATIENT)
Dept: RADIOLOGY | Facility: HOSPITAL | Age: 79
Discharge: HOME | End: 2024-12-10
Payer: MEDICARE

## 2024-12-10 DIAGNOSIS — R93.89 THICKENED ENDOMETRIUM: ICD-10-CM

## 2024-12-10 PROCEDURE — 76856 US EXAM PELVIC COMPLETE: CPT | Performed by: STUDENT IN AN ORGANIZED HEALTH CARE EDUCATION/TRAINING PROGRAM

## 2024-12-10 PROCEDURE — 76856 US EXAM PELVIC COMPLETE: CPT

## 2024-12-12 ENCOUNTER — OFFICE VISIT (OUTPATIENT)
Dept: OBSTETRICS AND GYNECOLOGY | Facility: HOSPITAL | Age: 79
End: 2024-12-12
Payer: MEDICARE

## 2024-12-12 VITALS
WEIGHT: 119 LBS | DIASTOLIC BLOOD PRESSURE: 67 MMHG | HEIGHT: 64 IN | HEART RATE: 95 BPM | SYSTOLIC BLOOD PRESSURE: 140 MMHG | BODY MASS INDEX: 20.32 KG/M2

## 2024-12-12 DIAGNOSIS — R93.89 THICKENED ENDOMETRIUM: Primary | ICD-10-CM

## 2024-12-12 PROCEDURE — 99203 OFFICE O/P NEW LOW 30 MIN: CPT | Performed by: OBSTETRICS & GYNECOLOGY

## 2024-12-12 PROCEDURE — 1160F RVW MEDS BY RX/DR IN RCRD: CPT | Performed by: OBSTETRICS & GYNECOLOGY

## 2024-12-12 PROCEDURE — 3077F SYST BP >= 140 MM HG: CPT | Performed by: OBSTETRICS & GYNECOLOGY

## 2024-12-12 PROCEDURE — 3078F DIAST BP <80 MM HG: CPT | Performed by: OBSTETRICS & GYNECOLOGY

## 2024-12-12 PROCEDURE — 1159F MED LIST DOCD IN RCRD: CPT | Performed by: OBSTETRICS & GYNECOLOGY

## 2024-12-12 PROCEDURE — 99213 OFFICE O/P EST LOW 20 MIN: CPT | Performed by: OBSTETRICS & GYNECOLOGY

## 2024-12-12 RX ORDER — ACETAMINOPHEN 325 MG/1
975 TABLET ORAL ONCE
OUTPATIENT
Start: 2024-12-12 | End: 2024-12-12

## 2024-12-12 ASSESSMENT — ENCOUNTER SYMPTOMS
DIZZINESS: 0
FREQUENCY: 0
HEADACHES: 0
DYSURIA: 0
DIARRHEA: 0
NAUSEA: 0
COUGH: 0
FEVER: 0
HEMATURIA: 0
CONSTIPATION: 0
WEAKNESS: 0
VOMITING: 0
PALPITATIONS: 0
CHILLS: 0
ABDOMINAL PAIN: 0
SHORTNESS OF BREATH: 0

## 2024-12-12 NOTE — PROGRESS NOTES
SUBJECTIVE    79 y.o.  Postmenopausal presents for   Chief Complaint   Patient presents with    Follow-up     Patient here for followup   States she had a CT SCAN and was referred        Patient is asymptomatic with a known fibroid history. No postmenopausal bleeding.     OB/GYN History  No LMP recorded. Patient is postmenopausal.    Social History     Substance and Sexual Activity   Sexual Activity Not on file       OB History    Para Term  AB Living   3 3 3         SAB IAB Ectopic Multiple Live Births                  # Outcome Date GA Lbr Fernando/2nd Weight Sex Type Anes PTL Lv   3 Term            2 Term            1 Term                The following portions of the chart were reviewed this encounter and updated as appropriate:    Tobacco  Allergies  Meds  Problems  Med Hx  Surg Hx  Fam Hx         Screenings  Social Drivers of Health     Tobacco Use: Medium Risk (2024)    Patient History     Smoking Tobacco Use: Former     Smokeless Tobacco Use: Unknown     Passive Exposure: Not on file   Alcohol Use: Not on file   Financial Resource Strain: Not on file   Food Insecurity: Not on file   Transportation Needs: Not on file   Physical Activity: Not on file   Stress: Not on file   Social Connections: Not on file   Intimate Partner Violence: Not on file   Depression: Not at risk (2024)    PHQ-2     PHQ-2 Score: 0   Housing Stability: Not on file   Utilities: Not on file   Digital Equity: Not on file   Health Literacy: Not on file         Review of Systems  Review of Systems   Constitutional:  Negative for chills and fever.   Eyes:  Negative for visual disturbance.   Respiratory:  Negative for cough and shortness of breath.    Cardiovascular:  Negative for chest pain and palpitations.   Gastrointestinal:  Negative for abdominal pain, constipation, diarrhea, nausea and vomiting.   Genitourinary:  Negative for dyspareunia, dysuria, frequency, hematuria, urgency, vaginal bleeding and  "vaginal discharge.   Neurological:  Negative for dizziness, weakness and headaches.        OBJECTIVE  Vitals:    12/12/24 1313   BP: 140/67   BP Location: Left arm   Patient Position: Sitting   BP Cuff Size: Child   Pulse: 95   Weight: 54 kg (119 lb)   Height: 1.626 m (5' 4\")     Body mass index is 20.43 kg/m².     Physical Exam  Constitutional:       Appearance: Normal appearance.   HENT:      Head: Normocephalic and atraumatic.      Nose: Nose normal.      Mouth/Throat:      Mouth: Mucous membranes are moist.   Eyes:      Extraocular Movements: Extraocular movements intact.      Pupils: Pupils are equal, round, and reactive to light.   Cardiovascular:      Rate and Rhythm: Normal rate.   Pulmonary:      Effort: Pulmonary effort is normal.   Musculoskeletal:         General: Normal range of motion.      Cervical back: Normal range of motion.   Neurological:      General: No focal deficit present.      Mental Status: She is alert and oriented to person, place, and time.   Skin:     General: Skin is warm and dry.   Psychiatric:         Mood and Affect: Mood normal.         Behavior: Behavior normal.   Vitals and nursing note reviewed.        Immunization History   Administered Date(s) Administered    COVID-19, mRNA, LNP-S, PF, 30 mcg/0.3 mL dose 01/31/2021, 02/21/2021, 08/21/2021    Flu vaccine, quadrivalent, high-dose, preservative free, age 65y+ (FLUZONE) 10/26/2022, 10/27/2023    Flu vaccine, quadrivalent, no egg protein, age 6 month or greater (FLUCELVAX) 10/11/2018    Flu vaccine, trivalent, preservative free, HIGH-DOSE, age 65y+ (Fluzone) 10/12/2016, 10/19/2017, 11/12/2019    Flu vaccine, trivalent, preservative free, age 6 months and greater (Fluarix/Fluzone/Flulaval) 10/09/2015    Influenza, Seasonal, Quadrivalent, Adjuvanted 10/09/2020, 11/18/2021    Influenza, Unspecified 12/10/2012    Influenza, seasonal, injectable 11/07/2013    Pfizer COVID-19 vaccine, 12 years and older, (30mcg/0.3mL) (Comirnaty) " "10/02/2023, 10/03/2023    Pfizer COVID-19 vaccine, bivalent, age 12 years and older (30 mcg/0.3 mL) 09/21/2022, 06/19/2023    Pfizer Purple Cap SARS-CoV-2 10/02/2023    Pneumococcal Conjugate PCV 7 10/28/1999    Pneumococcal conjugate vaccine, 13-valent (PREVNAR 13) 11/03/2017    Pneumococcal conjugate vaccine, 20-valent (PREVNAR 20) 10/16/2023, 10/16/2023    Pneumococcal polysaccharide vaccine, 23-valent, age 2 years and older (PNEUMOVAX 23) 10/09/2015    Tdap vaccine, age 7 year and older (BOOSTRIX, ADACEL) 01/15/2014    Zoster vaccine, recombinant, adult (SHINGRIX) 04/10/2018, 07/28/2018    Zoster, live 09/23/2010        ASSESSMENT & PLAN  Problem List Items Addressed This Visit          Ob-Gyn Problems    Thickened endometrium - Primary    Overview     - Thickened endometrium on CT surveillance for lung cancer, incidental   - Pelvic ultrasound 12/10: \"Thickened heterogeneous with internal vascularity measuring up to 1.5 cm\"   - Discussed options for endometrial sampling including in office endometrial biopsy and hysteroscopy  - Patient prefers hysteroscopy - will place case request         Relevant Orders    Case Request Operating Room: HYSTEROSCOPY, DIAGNOSTIC (Completed)    Type And Screen    CBC       Follow up: For surgery    Ramandeep Tapia MD  Obstetrics & Gynecology  12/12/24    "

## 2024-12-13 DIAGNOSIS — F41.9 ANXIETY: ICD-10-CM

## 2024-12-13 RX ORDER — ALPRAZOLAM 0.5 MG/1
0.5 TABLET ORAL 3 TIMES DAILY PRN
Qty: 90 TABLET | Refills: 0 | Status: SHIPPED | OUTPATIENT
Start: 2024-12-13 | End: 2025-01-12

## 2024-12-16 ENCOUNTER — TELEPHONE (OUTPATIENT)
Dept: OBSTETRICS AND GYNECOLOGY | Facility: HOSPITAL | Age: 79
End: 2024-12-16
Payer: MEDICARE

## 2024-12-16 NOTE — TELEPHONE ENCOUNTER
Verified patient by name and .   Patient scheduled for hysteroscopy 24  Patient informed of blood work needing completion 2-3 days prior to procedure.  Informed that surgery center will call the evening before with time to arrive.   She will need someone to drive her to and from procedure  She can have nothing to eat starting the midnight prior to procedure, she can have clear liquids up until arrival to surgery center.   Discussed reasons to call the office after procedure.   POV scheduled for 24  Mychart letter sent with the above information.   Patient verbalized understanding.   TERRENCE Parker

## 2024-12-16 NOTE — TELEPHONE ENCOUNTER
"----- Message from Nurse Leny VIEYRA sent at 12/12/2024  4:17 PM EST -----  Regarding: FW: DALIA Tapia has a procedure 12-24-24 at [facility] with attached patient    ----- Message -----  From: Kajal Morales  Sent: 12/12/2024   3:05 PM EST  To: Dori Devi MA; Ramandeep Tapia MD; #  Subject: FYI DR Tapia has a procedure 12-24-24 a#    FYI DR Tapia has a procedure 12-24-24 at [facility] with attached patient    Letters stating \"Getting Ready For Surgery\", \"Gynecologic Surgery: What to expect\" and the lab locations have been emailed to the patient today.     DR Tapia, Pre-OP and or labs can now be ordered if you haven't already done so, Thank you, Kajal"

## 2024-12-16 NOTE — LETTER
December 16, 2024     Herlinda Springer    Patient: Herlinda Springer   YOB: 1945   Date of Visit: 12/12/2024       Dear Dr. Herlinda Springer:    You're scheduled for your surgery at [facility] on 12/24/2024  The surgery center will call with a time the evening before.    Please open each attachment and read them over thoroughly.      Please be sure to complete your lab work 2-3 days before your procedure.     Please be sure to have someone with you to drive you to the hospital and drive you home afterwards. You should have nothing to eat after midnight, but you may drink water or clear liquids (black coffee or tea) until you arrive to the surgery center.      We recommend that patients shower the night before and the morning of the procedure with antibacterial soap. Please do not wear any jewelry, remove piercing's, contacts, makeup and lotion before the procedure.      Post-Operative care:  Please do not have sexual intercourse or put anything in the vagina (no tampons ect) until your post-op visit.  Warning signs: If you experience a fever, pain unrelieved by Tylenol or Motrin, heavy vaginal bleeding (bleeding through 1 pad or more an hour), unable to urinate, or pass a bowel movement after a stool softener, nausea or vomiting-if you experience any of these symptoms please call our office at [phone] option 1 for further guidance or present to nearest emergency room if after office ours.     Post-Op Appointment:   1/16/2024 @ 8:45 am    Sincerely,     CHRISTIANO SCHNEIDER RN      CC: No Recipients  ______________________________________________________________________________________

## 2024-12-17 ENCOUNTER — CLINICAL SUPPORT (OUTPATIENT)
Dept: PREADMISSION TESTING | Facility: HOSPITAL | Age: 79
End: 2024-12-17
Payer: MEDICARE

## 2024-12-17 NOTE — CPM/PAT H&P
CPM/PAT Evaluation       Name: Herlinda Springer (Herlinda Springer)  /Age: 1945/79 y.o.     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    Paroxysmal hemicrania    Essential (primary) hypertension 02/15/2018    White coat syndrome with hypertension    Hyperlipidemia 2016    Hypothyroidism 2016    Laceration without foreign body of scalp, initial encounter 01/15/2014    Laceration of scalp    Lobar pneumonia, unspecified organism (CMS-HCC) 2019    Lobar pneumonia    Lung cancer (Multi) 09/10/2020    Melanocytic nevi, unspecified 02/15/2018    Benign mole    Migraine with aura, not intractable, without status migrainosus 02/15/2018    Classic migraine with aura    Other specified disorders of bone density and structure, unspecified site 02/15/2018    Osteopenia    Pain in left ankle and joints of left foot     Chronic pain of left ankle    Pain in left ankle and joints of left foot 2017    Acute left ankle pain    Pain in left knee 2017    Acute pain of left knee    Personal history of other diseases of the musculoskeletal system and connective tissue 2015    History of muscle pain    Personal history of other diseases of the respiratory system 02/15/2018    History of acute sinusitis    Personal history of other endocrine, nutritional and metabolic disease 2016    History of vitamin D deficiency    Personal history of other specified conditions 2019    History of chronic cough    Personal history of other specified conditions 02/15/2018    History of shortness of breath    Pituitary tumor 2016    Primary open-angle glaucoma, left eye, severe stage 10/26/2016    Primary open angle glaucoma of left eye, severe stage     Primary open-angle glaucoma, right eye, moderate stage 04/21/2022    Primary open angle glaucoma of right eye, moderate stage    Rupture of popliteal cyst 06/18/2015    Ruptured Bakers cyst    Unspecified asthma with (acute) exacerbation ([facility]) 09/16/2019    Acute asthma exacerbation    Unspecified blepharitis left eye, unspecified eyelid 10/12/2015    Blepharitis of left eye    Unspecified glaucoma 02/15/2018    Glaucoma       Past Surgical History:   Procedure Laterality Date    CATARACT EXTRACTION      OTHER SURGICAL HISTORY  10/12/2015    Anal Fissurectomy    TONSILLECTOMY  08/01/2014    Tonsillectomy       Patient  has no history on file for sexual activity.    Family History   Problem Relation Name Age of Onset    Hypertension Mother      Migraines Mother      Heart disease Father      Glaucoma Father          suspect       Allergies   Allergen Reactions    Amoxicillin Unknown    Atorvastatin Other     Hx heart palpitations AND SEVERE MUSCLE CRAMPS    Latex Other and Unknown     Patient denies    Simvastatin Unknown     SEVERE MUSCLE CRAMPS       Prior to Admission medications    Medication Sig Start Date End Date Taking? Authorizing Provider   acyclovir (Zovirax) 400 mg tablet Take 1 tablet (400 mg) by mouth 2 times a day. 10/7/24 10/2/25  Sravani Huang MD   albuterol 90 mcg/actuation inhaler Inhale 2 puffs every 6 hours if needed for wheezing or shortness of breath. 10/17/24 10/17/25  Margaret Curtis APRN-[facility]   ALPRAZolam (Xanax) 0.5 mg tablet Take 1 tablet (0.5 mg) by mouth 3 times a day as needed for anxiety. 12/13/24 1/12/25  Karma King MD   b complex vitamins capsule Take by mouth.    Historical Provider, MD   calcium carbonate 600 mg calcium (1,500 mg) tablet Take 1 tablet (1,500 mg) by mouth 2 times a day. 4/12/18   Historical Provider, MD   coenzyme Q-10 100 mg capsule Take 1 capsule (100 mg) by mouth.    Historical Provider, MD   docusate sodium (Stool Softener) 100 mg tablet Take by  mouth.    Historical Provider, MD   folic acid (Folvite) 1 mg tablet Take 1 tablet (1 mg) by mouth once daily.    Historical Provider, MD   lecithin, soy 1,200 mg capsule Take 1,200 mg by mouth.    Historical Provider, MD   lysine 1,000 mg tablet Take 1 tablet (1,000 mg) by mouth.    Historical Provider, MD   melatonin 5 mg tablet Take 1 tablet (5 mg) by mouth once daily at bedtime. 6/9/22   Historical Provider, MD   MILK THISTLE ORAL Take 250 mg by mouth.    Historical Provider, MD   montelukast (Singulair) 10 mg tablet TAKE 1 TABLET (10 MG) BY MOUTH ONCE DAILY IN THE EVENING. 9/19/24   Alley Bui MD   psyllium husk, with sugar, (Metamucil, with sugar,) 3.4 gram/12 gram powder Take by mouth.    Historical Provider, MD   rosuvastatin (Crestor) 10 mg tablet Take 0.5 tablets (5 mg) by mouth once daily. 8/2/24   Alley Bui MD   selenium (SELENIMIN ORAL) Take by mouth.    Historical Provider, MD   Synthroid 75 mcg tablet Take 1 tablet (75 mcg) by mouth once daily. Five days/week, M-F 3/22/24   Alley Bui MD   turmeric root extract 500 mg tablet Take 500 mg by mouth.    Historical Provider, MD   vitamin D3-vitamin K2, MK4, 1,000-100 unit-mcg tablet Take 1,000 Units by mouth once daily.    Historical Provider, MD   zanubrutinib (Brukinsa) 80 mg capsule Take 2 capsules (160 mg total) by mouth 2 times a day.  Swallow whole. 10/7/24 10/7/25  Sravani Huang MD   ALPRAZolam (Xanax) 0.5 mg tablet Take 1 tablet (0.5 mg) by mouth 3 times a day as needed for anxiety. 11/11/24 12/13/24  Karma King MD        PAT ROS     PAT Physical Exam     Airway    Testing/Diagnostic:   ECHO 6/10/21  CONCLUSIONS:   1. Poorly visualized anatomical structures due to suboptimal image quality.   2. The left ventricular systolic function is normal with a 65% estimated ejection fraction.    Patient Specialist/PCP:   Data only, patient states chart is current due to GYN nurse call yesterday and will review medication  list again at CPM appointment on 12/20.  Medication Instructions given to stop all vitamins, supplements, and any NSAIDs (Alevel, Ibuprofen, Motrin, etc) 7 days prior to surgery. Patient agreed and repeated back in understanding.    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         Visit Vitals  OB Status Postmenopausal   Smoking Status Former       DASI Risk Score    No data to display       Caprini DVT Assessment    No data to display       Modified Frailty Index    No data to display       CHADS2 Stroke Risk  Current as of 19 minutes ago        N/A 3 to 100%: High Risk   2 to < 3%: Medium Risk   0 to < 2%: Low Risk     Last Change: N/A          This score determines the patient's risk of having a stroke if the patient has atrial fibrillation.        This score is not applicable to this patient. Components are not calculated.          Revised Cardiac Risk Index    No data  to display       Apfel Simplified Score    No data to display       Risk Analysis Index Results This Encounter    No data found in the last 10 encounters.       Prodigy: High Risk  Total Score: 16              Prodigy Age Score           ARISCAT Score for Postoperative Pulmonary Complications    No data to display       Talbot Perioperative Risk for Myocardial Infarction or Cardiac Arrest (EDWIGE)    No data to display         Assessment and Plan:     {[facility] EMBEDDED ASSESSMENT AND PLAN:50319}

## 2024-12-20 ENCOUNTER — PRE-ADMISSION TESTING (OUTPATIENT)
Dept: PREADMISSION TESTING | Facility: HOSPITAL | Age: 79
End: 2024-12-20
Payer: MEDICARE

## 2024-12-20 VITALS
DIASTOLIC BLOOD PRESSURE: 68 MMHG | BODY MASS INDEX: 20.55 KG/M2 | OXYGEN SATURATION: 100 % | HEIGHT: 64 IN | SYSTOLIC BLOOD PRESSURE: 122 MMHG | HEART RATE: 97 BPM | TEMPERATURE: 98.4 F | WEIGHT: 120.37 LBS

## 2024-12-20 DIAGNOSIS — R93.89 THICKENED ENDOMETRIUM: ICD-10-CM

## 2024-12-20 LAB
ABO GROUP (TYPE) IN BLOOD: NORMAL
ANTIBODY SCREEN: NORMAL
ERYTHROCYTE [DISTWIDTH] IN BLOOD BY AUTOMATED COUNT: 15.6 % (ref 11.5–14.5)
HCT VFR BLD AUTO: 33.4 % (ref 36–46)
HGB BLD-MCNC: 10.3 G/DL (ref 12–16)
MCH RBC QN AUTO: 28.7 PG (ref 26–34)
MCHC RBC AUTO-ENTMCNC: 30.8 G/DL (ref 32–36)
MCV RBC AUTO: 93 FL (ref 80–100)
NRBC BLD-RTO: 0 /100 WBCS (ref 0–0)
PLATELET # BLD AUTO: 161 X10*3/UL (ref 150–450)
RBC # BLD AUTO: 3.59 X10*6/UL (ref 4–5.2)
RH FACTOR (ANTIGEN D): NORMAL
WBC # BLD AUTO: 118.9 X10*3/UL (ref 4.4–11.3)

## 2024-12-20 PROCEDURE — 85027 COMPLETE CBC AUTOMATED: CPT

## 2024-12-20 PROCEDURE — 99204 OFFICE O/P NEW MOD 45 MIN: CPT | Performed by: NURSE PRACTITIONER

## 2024-12-20 PROCEDURE — 86901 BLOOD TYPING SEROLOGIC RH(D): CPT

## 2024-12-20 PROCEDURE — 36415 COLL VENOUS BLD VENIPUNCTURE: CPT

## 2024-12-20 ASSESSMENT — DUKE ACTIVITY SCORE INDEX (DASI)
CAN YOU RUN A SHORT DISTANCE: YES
CAN YOU DO LIGHT WORK AROUND THE HOUSE LIKE DUSTING OR WASHING DISHES: YES
CAN YOU DO YARD WORK LIKE RAKING LEAVES, WEEDING OR PUSHING A MOWER: YES
CAN YOU DO MODERATE WORK AROUND THE HOUSE LIKE VACUUMING, SWEEPING FLOORS OR CARRYING GROCERIES: YES
CAN YOU TAKE CARE OF YOURSELF (EAT, DRESS, BATHE, OR USE TOILET): YES
CAN YOU WALK INDOORS, SUCH AS AROUND YOUR HOUSE: YES
CAN YOU PARTICIPATE IN STRENOUS SPORTS LIKE SWIMMING, SINGLES TENNIS, FOOTBALL, BASKETBALL, OR SKIING: NO
CAN YOU WALK A BLOCK OR TWO ON LEVEL GROUND: YES
CAN YOU HAVE SEXUAL RELATIONS: NO
CAN YOU DO HEAVY WORK AROUND THE HOUSE LIKE SCRUBBING FLOORS OR LIFTING AND MOVING HEAVY FURNITURE: NO
DASI METS SCORE: 6.6
CAN YOU PARTICIPATE IN MODERATE RECREATIONAL ACTIVITIES LIKE GOLF, BOWLING, DANCING, DOUBLES TENNIS OR THROWING A BASEBALL OR FOOTBALL: NO
CAN YOU CLIMB A FLIGHT OF STAIRS OR WALK UP A HILL: YES
TOTAL_SCORE: 31.45

## 2024-12-20 ASSESSMENT — LIFESTYLE VARIABLES: SMOKING_STATUS: NONSMOKER

## 2024-12-20 NOTE — CPM/PAT H&P
CPM/PAT Evaluation       Name: Herlinda Springer (Herlinda Springer)  /Age: 1945/79 y.o.     Visit Type:   In-Person       Chief Complaint: perioperative evaluation      HPI  The patient is an 79 year old female with  history of fibroid uterus and postmenopausal bleeding. She had thickened endometrium on recent US. She presents today for perioperative evaluation in anticipation of HYSTEROSCOPY, DIAGNOSTIC on 24 with Dr. Tapia.    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    Paroxysmal hemicrania    Essential (primary) hypertension 02/15/2018    White coat syndrome with hypertension    Hyperlipidemia 2016    Hypothyroidism 2016    Laceration without foreign body of scalp, initial encounter 01/15/2014    Laceration of scalp    Lobar pneumonia, unspecified organism (CMS-HCC) 2019    Lobar pneumonia    Lung cancer (Multi) 09/10/2020    Melanocytic nevi, unspecified 02/15/2018    Benign mole    Migraine with aura, not intractable, without status migrainosus 02/15/2018    Classic migraine with aura    Other specified disorders of bone density and structure, unspecified site 02/15/2018    Osteopenia    Pain in left ankle and joints of left foot     Chronic pain of left ankle    Pain in left ankle and joints of left foot 2017    Acute left ankle pain    Pain in left knee 2017    Acute pain of left knee    Personal history of other diseases of the musculoskeletal system and connective tissue 2015    History of muscle pain    Personal history of other diseases of the respiratory system 02/15/2018    History of acute sinusitis    Personal history of other endocrine, nutritional and metabolic disease 2016    History of vitamin D deficiency    Personal history of  other specified conditions 09/16/2019    History of chronic cough    Personal history of other specified conditions 02/15/2018    History of shortness of breath    Pituitary tumor 03/30/2016    Primary open-angle glaucoma, left eye, severe stage 10/26/2016    Primary open angle glaucoma of left eye, severe stage    Primary open-angle glaucoma, right eye, moderate stage 04/21/2022    Primary open angle glaucoma of right eye, moderate stage    Rupture of popliteal cyst 06/18/2015    Ruptured Bakers cyst    Unspecified asthma with (acute) exacerbation ([facility]) 09/16/2019    Acute asthma exacerbation    Unspecified blepharitis left eye, unspecified eyelid 10/12/2015    Blepharitis of left eye    Unspecified glaucoma 02/15/2018    Glaucoma       Past Surgical History:   Procedure Laterality Date    CATARACT EXTRACTION      OTHER SURGICAL HISTORY  10/12/2015    Anal Fissurectomy    TONSILLECTOMY  08/01/2014    Tonsillectomy       Patient Sexual activity questions deferred to the physician.    Family History   Problem Relation Name Age of Onset    Hypertension Mother      Migraines Mother      Heart disease Father      Glaucoma Father          suspect       Allergies   Allergen Reactions    Amoxicillin Unknown    Atorvastatin Other     Hx heart palpitations AND SEVERE MUSCLE CRAMPS    Latex Other and Unknown     Patient denies    Simvastatin Unknown     SEVERE MUSCLE CRAMPS       Prior to Admission medications    Medication Sig Start Date End Date Taking? Authorizing Provider   acyclovir (Zovirax) 400 mg tablet Take 1 tablet (400 mg) by mouth 2 times a day. 10/7/24 10/2/25  Sravani Huang MD   albuterol 90 mcg/actuation inhaler Inhale 2 puffs every 6 hours if needed for wheezing or shortness of breath. 10/17/24 10/17/25  Margaret Curtis APRN-CNP   ALPRAZolam (Xanax) 0.5 mg tablet Take 1 tablet (0.5 mg) by mouth 3 times a day as needed for anxiety. 12/13/24 1/12/25  Karma King MD   b complex vitamins capsule Take  by mouth.    Historical Provider, MD   calcium carbonate 600 mg calcium (1,500 mg) tablet Take 1 tablet (1,500 mg) by mouth 2 times a day. 4/12/18   Historical Provider, MD   coenzyme Q-10 100 mg capsule Take 1 capsule (100 mg) by mouth.    Historical Provider, MD   docusate sodium (Stool Softener) 100 mg tablet Take by mouth.    Historical Provider, MD   folic acid (Folvite) 1 mg tablet Take 1 tablet (1 mg) by mouth once daily.    Historical Provider, MD   lecithin, soy 1,200 mg capsule Take 1,200 mg by mouth.    Historical Provider, MD   lysine 1,000 mg tablet Take 1 tablet (1,000 mg) by mouth.    Historical Provider, MD   melatonin 5 mg tablet Take 1 tablet (5 mg) by mouth once daily at bedtime. 6/9/22   Historical Provider, MD   MILK THISTLE ORAL Take 250 mg by mouth.    Historical Provider, MD   montelukast (Singulair) 10 mg tablet TAKE 1 TABLET (10 MG) BY MOUTH ONCE DAILY IN THE EVENING. 9/19/24   Alley Bui MD   psyllium husk, with sugar, (Metamucil, with sugar,) 3.4 gram/12 gram powder Take by mouth.    Historical Provider, MD   rosuvastatin (Crestor) 10 mg tablet Take 0.5 tablets (5 mg) by mouth once daily. 8/2/24   Alley Bui MD   selenium (SELENIMIN ORAL) Take by mouth.    Historical Provider, MD   Synthroid 75 mcg tablet Take 1 tablet (75 mcg) by mouth once daily. Five days/week, M-F 3/22/24   Alley Bui MD   turmeric root extract 500 mg tablet Take 500 mg by mouth.    Historical Provider, MD   vitamin D3-vitamin K2, MK4, 1,000-100 unit-mcg tablet Take 1,000 Units by mouth once daily.    Historical Provider, MD   zanubrutinib (Brukinsa) 80 mg capsule Take 2 capsules (160 mg total) by mouth 2 times a day.  Swallow whole. 10/7/24 10/7/25  Sravani Huang MD        PAT ROS     PAT Physical Exam     PAT AIRWAY:   Airway:     Mallampati::  II    TM distance::  >3 FB    Neck ROM::  Full  normal          Visit Vitals  /68   Pulse 97   Temp 36.9 °C (98.4 °F)   Ht 1.626 m (5'  "4\")   Wt 54.6 kg (120 lb 5.9 oz)   SpO2 100%   BMI 20.66 kg/m²   OB Status Postmenopausal   Smoking Status Former   BSA 1.57 m²       DASI Risk Score      Flowsheet Row Pre-Admission Testing from 12/20/2024 in [facility]   Can you take care of yourself (eat, dress, bathe, or use toilet)?  2.75 filed at 12/20/2024 1039   Can you walk indoors, such as around your house? 1.75 filed at 12/20/2024 1039   Can you walk a block or two on level ground?  2.75 filed at 12/20/2024 1039   Can you climb a flight of stairs or walk up a hill? 5.5 filed at 12/20/2024 1039   Can you run a short distance? 8 filed at 12/20/2024 1039   Can you do light work around the house like dusting or washing dishes? 2.7 filed at 12/20/2024 1039   Can you do moderate work around the house like vacuuming, sweeping floors or carrying groceries? 3.5 filed at 12/20/2024 1039   Can you do heavy work around the house like scrubbing floors or lifting and moving heavy furniture?  0 filed at 12/20/2024 1039   Can you do yard work like raking leaves, weeding or pushing a mower? 4.5 filed at 12/20/2024 1039   Can you have sexual relations? 0 filed at 12/20/2024 1039   Can you participate in moderate recreational activities like golf, bowling, dancing, doubles tennis or throwing a baseball or football? 0 filed at 12/20/2024 1039   Can you participate in strenous sports like swimming, singles tennis, football, basketball, or skiing? 0 filed at 12/20/2024 1039   DASI SCORE 31.45 filed at 12/20/2024 1039   METS Score (Will be calculated only when all the questions are answered) 6.6 filed at 12/20/2024 1039          Caprini DVT Assessment      Flowsheet Row Pre-Admission Testing from 12/20/2024 in [facility]   DVT Score 7 filed at 12/20/2024 1118   Medical Factors Present cancer, chemotherapy, or previous malignancy filed at 12/20/2024 1118   Surgical Factors Minor surgery planned filed at 12/20/2024 1118   BMI 30 or less filed at " 12/20/2024 1118          Modified Frailty Index      Flowsheet Row Pre-Admission Testing from 12/20/2024 in [facility]   Non-independent functional status (problems with dressing, bathing, personal grooming, or cooking) 0 filed at 12/20/2024 1118   History of diabetes mellitus  0 filed at 12/20/2024 1118   History of COPD 0 filed at 12/20/2024 1118   History of CHF No filed at 12/20/2024 1118   History of MI 0 filed at 12/20/2024 1118   History of Percutaneous Coronary Intervention, Cardiac Surgery, or Angina No filed at 12/20/2024 1118   Hypertension requiring the use of medication  0 filed at 12/20/2024 1118   Peripheral vascular disease 0 filed at 12/20/2024 1118   Impaired sensorium (cognitive impairement or loss, clouding, or delirium) 0 filed at 12/20/2024 1118   TIA or CVA withouy residual deficit 0 filed at 12/20/2024 1118   Cerebrovascular accident with deficit 0 filed at 12/20/2024 1118   Modified Frailty Index Calculator 0 filed at 12/20/2024 1118          CHADS2 Stroke Risk  Current as of today        N/A 3 to 100%: High Risk   2 to < 3%: Medium Risk   0 to < 2%: Low Risk     Last Change: N/A          This score determines the patient's risk of having a stroke if the patient has atrial fibrillation.        This score is not applicable to this patient. Components are not calculated.          Revised Cardiac Risk Index      Flowsheet Row Pre-Admission Testing from 12/20/2024 in [facility]   High-Risk Surgery (Intraperitoneal, Intrathoracic,Suprainguinal vascular) 0 filed at 12/20/2024 1118   History of ischemic heart disease (History of MI, History of positive exercuse test, Current chest paint considered due to myocardial ischemia, Use of nitrate therapy, ECG with pathological Q Waves) 0 filed at 12/20/2024 1118   History of congestive heart failure (pulmonary edemia, bilateral rales or S3 gallop, Paroxysmal nocturnal dyspnea, CXR showing pulmonary vascular  redistribution) 0 filed at 12/20/2024 1118   History of cerebrovascular disease (Prior TIA or stroke) 0 filed at 12/20/2024 1118   Pre-operative insulin treatment 0 filed at 12/20/2024 1118   Pre-operative creatinine>2 mg/dl 0 filed at 12/20/2024 1118   Revised Cardiac Risk Calculator 0 filed at 12/20/2024 1118          Apfel Simplified Score      Flowsheet Row Pre-Admission Testing from 12/20/2024 in [facility]   Smoking status 1 filed at 12/20/2024 1118   History of motion sickness or PONV  0 filed at 12/20/2024 1118   Use of postoperative opioids 1 filed at 12/20/2024 1118   Gender - Female 1=Yes filed at 12/20/2024 1118   Apfel Simplified Score Calculator 3 filed at 12/20/2024 1118          Risk Analysis Index Results This Encounter    No data found in the last 10 encounters.       Stop Bang Score      Flowsheet Row Pre-Admission Testing from 12/20/2024 in [facility]   Do you snore loudly? 0 filed at 12/20/2024 1039   Do you often feel tired or fatigued after your sleep? 0 filed at 12/20/2024 1039   Has anyone ever observed you stop breathing in your sleep? 0 filed at 12/20/2024 1039   Do you have or are you being treated for high blood pressure? 0 filed at 12/20/2024 1039   Recent BMI (Calculated) 20.4 filed at 12/20/2024 1039   Is BMI greater than 35 kg/m2? 0=No filed at 12/20/2024 1039   Age older than 50 years old? 1=Yes filed at 12/20/2024 1039   Is your neck circumference greater than 17 inches (Male) or 16 inches (Female)? 0 filed at 12/20/2024 1039   Gender - Male 0=No filed at 12/20/2024 1039   STOP-BANG Total Score 1 filed at 12/20/2024 1039          Prodigy: High Risk  Total Score: 16              Prodigy Age Score           ARISCAT Score for Postoperative Pulmonary Complications    No data to display       [place]bot Perioperative Risk for Myocardial Infarction or Cardiac Arrest (EDWIGE)    No data to display       Recent Results (from the past week)   Type And Screen     Collection Time: 12/20/24 11:17 AM   Result Value Ref Range    ABO TYPE A     Rh TYPE POS     ANTIBODY SCREEN NEG    CBC    Collection Time: 12/20/24 11:17 AM   Result Value Ref Range    .9 (HH) 4.4 - 11.3 x10*3/uL    nRBC 0.0 0.0 - 0.0 /100 WBCs    RBC 3.59 (L) 4.00 - 5.20 x10*6/uL    Hemoglobin 10.3 (L) 12.0 - 16.0 g/dL    Hematocrit 33.4 (L) 36.0 - 46.0 %    MCV 93 80 - 100 fL    MCH 28.7 26.0 - 34.0 pg    MCHC 30.8 (L) 32.0 - 36.0 g/dL    RDW 15.6 (H) 11.5 - 14.5 %    Platelets 161 150 - 450 x10*3/uL        Diagnostic Results    EKG 10/17/24  Normal sinus rhythm  Cannot rule out Anterior infarct , age undetermined  Abnormal ECG  No previous ECGs available    Assessment and Plan:     Anesthesia  The patient denies problems with anesthesia in the past such as PONV, prolonged sedation, awareness, dental damage, aspiration, cardiac arrest, difficult intubation, or unexpected hospital admissions.     Neurology  #Anxiety  -managed on xanax as needed. No acute neurological complaints.  The patient is at increased risk for postoperative delirium secondary to age 65 or older. The patient is at increased risk for perioperative stroke secondary to increased age, hyperlipidemia, female gender. Handouts for preoperative brain exercises given to patient.    HEENT/Airway  No diagnoses, significant findings on chart review, clinical presentation, or evaluation. No documented or reported history of airway difficulty.     Cardiovascular  #HTN  -not currently requiring medications. BP today stable at 122/68.  #HLD  -managed on rosuvastatin, instructed to continue as prescribed in the perioperative period    Cardiology Evaluation  The patient is not followed by cardiology.    She is scheduled for a low risk procedure. Therefore, no further cardiac evaluation is indicated.    METS  The patient's functional capacity capacity is greater than 4 METS.  RCRI  The patient meets 0 RCRI criteria and therefor has a 3.9% risk of major  adverse cardiac complications.  EDWIGE score which indicates a 0.2% risk of intraoperative or 30-day postoperative MACE (major adverse cardiac event).    Pulmonary  #Asthma  -managed on inhalers. Denies any recent URIs, exacerbations or hospitalizations   #NSCLC  -s/p treatment, stable-see heme section    The patient is at increased risk of perioperative pulmonary complications secondary to advanced age greater than 60.    STOP BANG 1, which places patient at low risk for having CONY.  ARISCAT 16, low, 1.6% risk of in-hospital postoperative pulmonary complications  PRODIGY 19, high risk of respiratory depression episode.     Patient given PI sheet for preoperative deep breathing exercises.  Encourage  incentive spirometry in the postoperative period as deemed necessary.    Endocrine  #Hypothyroidism  -managed on levothyroxine, instructed to continue as prescribed in the perioperative period. TSH WNL 2/22/24    Gastrointestinal  No diagnoses or significant findings on chart review or clinical presentation and evaluation.    Eat 10- 0,  self-perceived oropharyngeal dysphagia scale (0-40)     Genitourinary  #Thickened endometrium  -PMB, scheduled for hysteroscopy 12/24.    Renal  No renal diagnoses or significant findings on chart review or clinical presentation and evaluation. The patient has specific risk factors associated with increased risk of perioperative renal complications related to age greater than 55, hypertension. Preventative measures include preoperative hydration.      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  Followed by oncology. LOV 12/2/24 with Isabel Barton, APRN-CNP     Capsaeedi score 7, high risk of  perioperative VTE.     Patient instructed to ambulate as soon as possible postoperatively to decrease thromboembolic risk. Initiate mechanical DVT prophylaxis as soon as possible and initiate chemical prophylaxis when deemed safe from a bleeding standpoint post surgery.     Transfusion Evaluation  T&S not obtained. Low likelihood for perioperative transfusion of blood or blood products.    Musculoskeletal  No diagnoses or significant findings on chart review or clinical presentation and evaluation.    ID  No diagnoses or significant findings on chart review or clinical presentation and evaluation.    -Preoperative medication instructions were provided and reviewed with the patient.  Any additional testing or evaluation was explained to the patient.  NPO Instructions were discussed, and the patient's questions were answered prior to conclusion of this encounter. Patient verbalized understanding of preoperative instructions. After Visit Summary given.

## 2024-12-20 NOTE — PREPROCEDURE INSTRUCTIONS
Fasting Guidelines    NPO Instructions:    Do not eat any food after midnight the night before your surgery/procedure.  You may have up to 13.5 ounces of clear liquids until TWO hours before your instructed arrival time to the hospital. This includes water, black tea/coffee, (no milk or cream), apple juice, and/or electrolyte drinks (Gatorade).  You may chew gum up to TWO hours before your surgery/procedure.    Additional Instructions:    Avoid herbal supplements, multivitamins and NSAIDS (non-steroidal anti-inflammatory drugs) such as Advil, Aleve, Ibuprofen, Naproxen, Excedrin, Meloxicam or Celebrex for at least 7 days prior to surgery. May take Tylenol as needed.    Avoid tobacco and alcohol products for 24 hours prior to surgery.    CONTACT SURGEON'S OFFICE IF YOU DEVELOP:  * Fever = 100.4 F   * New respiratory symptoms (e.g. cough, shortness of breath, respiratory distress, sore throat)  * Recent loss of taste or smell  *Flu like symptoms such as headache, fatigue or gastrointestinal symptoms  * You develop any open sores, shingles, burning or painful urination   AND/OR:  * You no longer wish to have the surgery.  * Any other personal circumstances change that may lead to the need to cancel or defer this surgery.  *You were admitted to any hospital within one week of your planned procedure.    Seven/Six Days before Surgery:  Review your medication instructions, stop indicated medications    Day of Surgery:  Review your medication instructions, take indicated medications  Wear comfortable loose fitting clothing  Do not use moisturizers, creams, lotions or perfume  All jewelry and valuables should be left at home    [facility]iguel Hines [facility]  [facility]  [phone]  [phone]  [email or web address]      Preoperative Brain Exercises    What are brain exercises?  A brain exercise is any activity that engages your thinking (cognitive) skills.    What types of  activities are considered brain exercises?  Jigsaw puzzles, crossword puzzles, word jumble, memory games, word search, and many more.  Many can be found free online or on your phone via a mobile yaneth.    Why should I do brain exercises before my surgery?  More recent research has shown brain exercise before surgery can lower the risk of postoperative delirium (confusion) which can be especially important for older adults.  Patients who did brain exercises for 5 to 10 hours the days before surgery, cut their risk of postoperative delirium in half up to 1 week after surgery.         [facility]    Preoperative Deep Breathing Exercises    Why it is important to do deep breathing exercises before my surgery?  Deep breathing exercises strengthen your breathing muscles.  This helps you to recover after your surgery and decreases the chance of breathing complications.      How are the deep breathing exercises done?  Sit straight with your back supported.  Breathe in deeply and slowly through your nose. Your lower rib cage should expand and your abdomen may move forward.  Hold that breath for 3 to 5 seconds.  Breathe out through pursed lips, slowly and completely.  Rest and repeat 10 times every hour while awake.  Rest longer if you become dizzy or lightheaded.         Patient and Family Education             Ways You Can Help Prevent Blood Clots             This handout explains some simple things you can do to help prevent blood clots.      Blood clots are blockages that can form in the body's veins. When a blood clot forms in your deep veins, it may be called a deep vein thrombosis, or DVT for short. Blood clots can happen in any part of the body where blood flows, but they are most common in the arms and legs. If a piece of a blood clot breaks free and travels to the lungs, it is called a pulmonary embolus (PE). A PE can be a very serious problem.         Being in the hospital or having surgery  can raise your chances of getting a blood clot because you may not be well enough to move around as much as you normally do.         Ways you can help prevent blood clots in the hospital         Wearing SCDs. SCDs stands for Sequential Compression Devices.   SCDs are special sleeves that wrap around your legs  They attach to a pump that fills them with air to gently squeeze your legs every few minutes.   This helps return the blood in your legs to your heart.   SCDs should only be taken off when walking or bathing.   SCDs may not be comfortable, but they can help save your life.               Wearing compression stockings - if your doctor orders them. These special snug fitting stockings gently squeeze your legs to help blood flow.       Walking. Walking helps move the blood in your legs.   If your doctor says it is ok, try walking the halls at least   5 times a day. Ask us to help you get up, so you don't fall.      Taking any blood thinning medicines your doctor orders.        Page 1 of 2     [facility]; 3/23   Ways you can help prevent blood clots at home       Wearing compression stockings - if your doctor orders them. ? Walking - to help move the blood in your legs.       Taking any blood thinning medicines your doctor orders.      Signs of a blood clot or PE      Tell your doctor or nurse know right away if you have of the problems listed below.    If you are at home, seek medical care right away. Call 911 for chest pain or problems breathing.               Signs of a blood clot (DVT) - such as pain,  swelling, redness or warmth in your arm or leg      Signs of a pulmonary embolism (PE) - such as chest     pain or feeling short of breath

## 2024-12-23 ENCOUNTER — ANESTHESIA EVENT (OUTPATIENT)
Dept: OPERATING ROOM | Facility: HOSPITAL | Age: 79
End: 2024-12-23
Payer: MEDICARE

## 2024-12-24 ENCOUNTER — ANESTHESIA (OUTPATIENT)
Dept: OPERATING ROOM | Facility: HOSPITAL | Age: 79
End: 2024-12-24
Payer: MEDICARE

## 2024-12-24 ENCOUNTER — HOSPITAL ENCOUNTER (OUTPATIENT)
Facility: HOSPITAL | Age: 79
Setting detail: OUTPATIENT SURGERY
Discharge: HOME | End: 2024-12-24
Attending: OBSTETRICS & GYNECOLOGY | Admitting: OBSTETRICS & GYNECOLOGY
Payer: MEDICARE

## 2024-12-24 VITALS
DIASTOLIC BLOOD PRESSURE: 60 MMHG | BODY MASS INDEX: 22.07 KG/M2 | OXYGEN SATURATION: 99 % | WEIGHT: 119.93 LBS | TEMPERATURE: 97.9 F | HEART RATE: 79 BPM | HEIGHT: 62 IN | RESPIRATION RATE: 15 BRPM | SYSTOLIC BLOOD PRESSURE: 130 MMHG

## 2024-12-24 DIAGNOSIS — R93.89 THICKENED ENDOMETRIUM: Primary | ICD-10-CM

## 2024-12-24 PROCEDURE — 7100000009 HC PHASE TWO TIME - INITIAL BASE CHARGE: Performed by: OBSTETRICS & GYNECOLOGY

## 2024-12-24 PROCEDURE — 7100000001 HC RECOVERY ROOM TIME - INITIAL BASE CHARGE: Performed by: OBSTETRICS & GYNECOLOGY

## 2024-12-24 PROCEDURE — 7100000002 HC RECOVERY ROOM TIME - EACH INCREMENTAL 1 MINUTE: Performed by: OBSTETRICS & GYNECOLOGY

## 2024-12-24 PROCEDURE — 3700000001 HC GENERAL ANESTHESIA TIME - INITIAL BASE CHARGE: Performed by: OBSTETRICS & GYNECOLOGY

## 2024-12-24 PROCEDURE — 58558 HYSTEROSCOPY BIOPSY: CPT | Performed by: OBSTETRICS & GYNECOLOGY

## 2024-12-24 PROCEDURE — 7100000010 HC PHASE TWO TIME - EACH INCREMENTAL 1 MINUTE: Performed by: OBSTETRICS & GYNECOLOGY

## 2024-12-24 PROCEDURE — 2720000007 HC OR 272 NO HCPCS: Performed by: OBSTETRICS & GYNECOLOGY

## 2024-12-24 PROCEDURE — A58555 PR HYSTEROSCOPY,DX,SEP PROC: Performed by: ANESTHESIOLOGY

## 2024-12-24 PROCEDURE — 3600000008 HC OR TIME - EACH INCREMENTAL 1 MINUTE - PROCEDURE LEVEL THREE: Performed by: OBSTETRICS & GYNECOLOGY

## 2024-12-24 PROCEDURE — 2500000004 HC RX 250 GENERAL PHARMACY W/ HCPCS (ALT 636 FOR OP/ED)

## 2024-12-24 PROCEDURE — 3600000003 HC OR TIME - INITIAL BASE CHARGE - PROCEDURE LEVEL THREE: Performed by: OBSTETRICS & GYNECOLOGY

## 2024-12-24 PROCEDURE — 2500000001 HC RX 250 WO HCPCS SELF ADMINISTERED DRUGS (ALT 637 FOR MEDICARE OP)

## 2024-12-24 PROCEDURE — 88305 TISSUE EXAM BY PATHOLOGIST: CPT | Mod: TC,SUR | Performed by: OBSTETRICS & GYNECOLOGY

## 2024-12-24 PROCEDURE — 3700000002 HC GENERAL ANESTHESIA TIME - EACH INCREMENTAL 1 MINUTE: Performed by: OBSTETRICS & GYNECOLOGY

## 2024-12-24 PROCEDURE — 99100 ANES PT EXTEME AGE<1 YR&>70: CPT | Performed by: ANESTHESIOLOGY

## 2024-12-24 PROCEDURE — 2500000005 HC RX 250 GENERAL PHARMACY W/O HCPCS: Performed by: OBSTETRICS & GYNECOLOGY

## 2024-12-24 RX ORDER — ACETAMINOPHEN 325 MG/1
975 TABLET ORAL ONCE
Status: DISCONTINUED | OUTPATIENT
Start: 2024-12-24 | End: 2024-12-24 | Stop reason: HOSPADM

## 2024-12-24 RX ORDER — PROPOFOL 10 MG/ML
INJECTION, EMULSION INTRAVENOUS AS NEEDED
Status: DISCONTINUED | OUTPATIENT
Start: 2024-12-24 | End: 2024-12-24

## 2024-12-24 RX ORDER — ACETAMINOPHEN 325 MG/1
650 TABLET ORAL EVERY 4 HOURS PRN
Status: DISCONTINUED | OUTPATIENT
Start: 2024-12-24 | End: 2024-12-24 | Stop reason: HOSPADM

## 2024-12-24 RX ORDER — DROPERIDOL 2.5 MG/ML
0.62 INJECTION, SOLUTION INTRAMUSCULAR; INTRAVENOUS ONCE AS NEEDED
Status: DISCONTINUED | OUTPATIENT
Start: 2024-12-24 | End: 2024-12-24 | Stop reason: HOSPADM

## 2024-12-24 RX ORDER — LIDOCAINE HYDROCHLORIDE 10 MG/ML
0.1 INJECTION, SOLUTION EPIDURAL; INFILTRATION; INTRACAUDAL; PERINEURAL ONCE
Status: DISCONTINUED | OUTPATIENT
Start: 2024-12-24 | End: 2024-12-24 | Stop reason: HOSPADM

## 2024-12-24 RX ORDER — LABETALOL HYDROCHLORIDE 5 MG/ML
5 INJECTION, SOLUTION INTRAVENOUS ONCE AS NEEDED
Status: DISCONTINUED | OUTPATIENT
Start: 2024-12-24 | End: 2024-12-24 | Stop reason: HOSPADM

## 2024-12-24 RX ORDER — SODIUM CHLORIDE 0.9 G/100ML
IRRIGANT IRRIGATION AS NEEDED
Status: DISCONTINUED | OUTPATIENT
Start: 2024-12-24 | End: 2024-12-24 | Stop reason: HOSPADM

## 2024-12-24 RX ORDER — LIDOCAINE HYDROCHLORIDE 20 MG/ML
INJECTION, SOLUTION INFILTRATION; PERINEURAL AS NEEDED
Status: DISCONTINUED | OUTPATIENT
Start: 2024-12-24 | End: 2024-12-24

## 2024-12-24 RX ORDER — HYDROMORPHONE HYDROCHLORIDE 0.2 MG/ML
0.2 INJECTION INTRAMUSCULAR; INTRAVENOUS; SUBCUTANEOUS EVERY 5 MIN PRN
Status: DISCONTINUED | OUTPATIENT
Start: 2024-12-24 | End: 2024-12-24 | Stop reason: HOSPADM

## 2024-12-24 RX ORDER — HYDROMORPHONE HYDROCHLORIDE 0.2 MG/ML
0.1 INJECTION INTRAMUSCULAR; INTRAVENOUS; SUBCUTANEOUS EVERY 5 MIN PRN
Status: DISCONTINUED | OUTPATIENT
Start: 2024-12-24 | End: 2024-12-24 | Stop reason: HOSPADM

## 2024-12-24 RX ORDER — ONDANSETRON HYDROCHLORIDE 2 MG/ML
4 INJECTION, SOLUTION INTRAVENOUS ONCE AS NEEDED
Status: DISCONTINUED | OUTPATIENT
Start: 2024-12-24 | End: 2024-12-24 | Stop reason: HOSPADM

## 2024-12-24 RX ORDER — ONDANSETRON HYDROCHLORIDE 2 MG/ML
INJECTION, SOLUTION INTRAVENOUS AS NEEDED
Status: DISCONTINUED | OUTPATIENT
Start: 2024-12-24 | End: 2024-12-24

## 2024-12-24 RX ORDER — FENTANYL CITRATE 50 UG/ML
INJECTION, SOLUTION INTRAMUSCULAR; INTRAVENOUS AS NEEDED
Status: DISCONTINUED | OUTPATIENT
Start: 2024-12-24 | End: 2024-12-24

## 2024-12-24 RX ORDER — OXYCODONE HYDROCHLORIDE 5 MG/1
5 TABLET ORAL EVERY 4 HOURS PRN
Status: DISCONTINUED | OUTPATIENT
Start: 2024-12-24 | End: 2024-12-24 | Stop reason: HOSPADM

## 2024-12-24 RX ORDER — MIDAZOLAM HYDROCHLORIDE 1 MG/ML
INJECTION INTRAMUSCULAR; INTRAVENOUS AS NEEDED
Status: DISCONTINUED | OUTPATIENT
Start: 2024-12-24 | End: 2024-12-24

## 2024-12-24 RX ORDER — HYDRALAZINE HYDROCHLORIDE 20 MG/ML
5 INJECTION INTRAMUSCULAR; INTRAVENOUS EVERY 30 MIN PRN
Status: DISCONTINUED | OUTPATIENT
Start: 2024-12-24 | End: 2024-12-24 | Stop reason: HOSPADM

## 2024-12-24 RX ORDER — OXYCODONE HYDROCHLORIDE 5 MG/1
2.5 TABLET ORAL EVERY 4 HOURS PRN
Status: DISCONTINUED | OUTPATIENT
Start: 2024-12-24 | End: 2024-12-24 | Stop reason: HOSPADM

## 2024-12-24 RX ADMIN — ACETAMINOPHEN 650 MG: 325 TABLET ORAL at 10:40

## 2024-12-24 SDOH — HEALTH STABILITY: MENTAL HEALTH: CURRENT SMOKER: 0

## 2024-12-24 ASSESSMENT — PAIN - FUNCTIONAL ASSESSMENT
PAIN_FUNCTIONAL_ASSESSMENT: 0-10

## 2024-12-24 ASSESSMENT — PAIN SCALES - GENERAL
PAINLEVEL_OUTOF10: 0 - NO PAIN
PAINLEVEL_OUTOF10: 2
PAIN_LEVEL: 0
PAINLEVEL_OUTOF10: 2

## 2024-12-24 NOTE — ANESTHESIA PREPROCEDURE EVALUATION
Patient: Herlinda Springer    Procedure Information       Anesthesia Start Date/Time: 12/24/24 0926    Procedure: HYSTEROSCOPY, DIAGNOSTIC    Location: [facility] OR 01 / Virtual [facility] OR    Surgeons: Ramandeep Tapia MD            Relevant Problems   Cardiac   (+) Essential hypertension   (+) Hyperlipidemia   (+) White coat syndrome with hypertension      Pulmonary   (+) Intermittent asthma (HHS-HCC)   (+) Metastatic lung cancer (metastasis from lung to other site) (Multi)   (+) Moderate asthma (HHS-HCC)   (+) Primary malignant neoplasm of right upper lobe of lung (Multi)   (+) Pulmonary nodules/lesions, multiple      Neuro   (+) Anxiety      Endocrine   (+) Hypothyroidism      Hematology   (+) CLL (chronic lymphocytic leukemia) (Multi)      Musculoskeletal   (+) Osteoarthritis of lumbar spine      HEENT   (+) Glaucoma   (+) Primary open angle glaucoma of left eye, moderate stage      ID   (+) Herpes simplex      Skin   (+) Rash, drug       Clinical information reviewed:   Tobacco  Allergies  Meds   Med Hx  Surg Hx   Fam Hx  Soc Hx        NPO Detail:  NPO/Void Status  Date of Last Liquid: 12/24/24  Time of Last Liquid: 0400  Date of Last Solid: 12/23/24  Time of Last Solid: 1730         Physical Exam    Airway  Mallampati: II  TM distance: >3 FB     Cardiovascular - normal exam     Dental - normal exam     Pulmonary - normal exam     Abdominal            Anesthesia Plan    History of general anesthesia?: yes  History of complications of general anesthesia?: no    ASA 3     general     The patient is not a current smoker.    intravenous induction   Postoperative administration of opioids is intended.  Trial extubation is planned.  Anesthetic plan and risks discussed with patient.  Use of blood products discussed with patient who.    Plan discussed with resident.

## 2024-12-24 NOTE — ANESTHESIA PROCEDURE NOTES
Airway  Date/Time: 12/24/2024 9:37 AM  Urgency: elective    Airway not difficult    Staffing  Performed: resident   Authorized by: Jacky Osei MD    Performed by: Elise Correia MD  Patient location during procedure: OR    Indications and Patient Condition  Indications for airway management: anesthesia and airway protection  Spontaneous ventilation: present  Sedation level: deep  Preoxygenated: yes  Patient position: sniffing  Mask difficulty assessment: 0 - not attempted  Planned trial extubation    Final Airway Details  Final airway type: supraglottic airway      Successful airway: Supreme  Size 4     Number of attempts at approach: 1  Number of other approaches attempted: 0

## 2024-12-24 NOTE — OP NOTE
Date: 2024  OR Location: [facility] OR    Name: Herlinda Springer, : 1945, Age: 79 y.o., MRN: [id number], Sex: female    Diagnosis  Pre-op Diagnosis      * Thickened endometrium [R93.89] Post-op Diagnosis     * Thickened endometrium [R93.89]     Procedures  HYSTEROSCOPY, DIAGNOSTIC with Aveta  61683 - [place] HYSTEROSCOPY DIAGNOSTIC SEPARATE PROCEDURE      Surgeons      * Ramandeep Tapia - Primary    Resident/Fellow/Other Assistant:  Surgeons and Role:     * Yomi Schwab MD - Resident - Assisting    Staff:   Circulator: Guilherme Gimenez Person: Ciara    Anesthesia Staff: Anesthesiologist: Jacky Osei MD  Anesthesia Resident: Elise Correia MD    Procedure Summary  Anesthesia: General  ASA: III  Estimated Blood Loss: 2mL  Intra-op Medications:   Administrations occurring from 0900 to 1000 on 24:   Medication Name Total Dose   sodium chloride 0.9 % irrigation solution 2,000 mL   surgical lubricant gel 1 Application   fentaNYL (Sublimaze) injection 50 mcg/mL 100 mcg   lidocaine (Xylocaine) injection 2 % 40 mg   midazolam PF (Versed) injection 1 mg/mL 1 mg   ondansetron 2 mg/mL 4 mg   propofol (Diprivan) injection 10 mg/mL 180 mg              Anesthesia Record               Intraprocedure I/O Totals       None           Specimen:   ID Type Source Tests Collected by Time   1 : Endometrial Curettings Tissue ENDOMETRIUM CURETTINGS SURGICAL PATHOLOGY EXAM Ramandeep Tapia MD 2024 0956   2 : endometrial polyp Tissue ENDOMETRIUM POLYPECTOMY - HYSTEROSCOPIC SURGICAL PATHOLOGY EXAM Ramandeep Tapia MD 2024 0957                 Procedure Details:  The patient was seen in the preoperative area. The site of surgery was properly noted/marked if necessary per policy. The patient has been actively warmed in preoperative area. Preoperative antibiotics are not indicated. Venous thrombosis prophylaxis are not indicated.    Findings: Normal appearing external genitalia with atrophic changes, c/w  postmenopausal status. Normal appearing cervix and vagina. Uterus anteverted. 2 polyps noted along the anterior and posterior uterine cavity wall on the left, anterior polyp measuring approximately 2cm, posterior polype measuring 3cm.    The patient was taken to the OR.  A time out was performed.  Anesthesia was then induced without difficulty.  The patient was then repositioned in dorsal lithotomy using the Oscar stirrups.  She was then prepped and draped in the usual sterile fashion.  A  pediatric bivalve speculum was placed in the vagina being careful to avoid trauma to surrounding atrophic vaginal tissue.  A tenaculum was placed on the anterior lip of the cervix. The cervix was then gently serially dilated to 12 Ukrainian. Aveta hysteroscope was gently inserted and normal saline infusion was started. Bilateral cornu appeared normal, but a two hypopigmented polyps were noted in the anterior left and posterior left uterine cavity wall. The hysteroscopic resection device was used to gently excise this polyp under visualization. Tissue was sent to pathology.  Bleeding was minimal. Mutiple passes of sharp curretage were then performed to obtain endometrial currettings with the polyp samples. Tenaculum was removed and all instruments were removed from the vagina.  The tenaculum site was hemostatic without additonal bleeding from the cervical os. Fluid deficit was 150 ml.  The patient tolerated the procedure well.  All counts were correct.  The patient was awoken and taken to the PACU.     Complications:  None; patient tolerated the procedure well.     Disposition: PACU - hemodynamically stable.  Condition: stable

## 2024-12-24 NOTE — HOSPITAL COURSE
"78yo female presenting for hysteroscopy, D&C in the setting of thickened endometrium on imaging surveillance for lung cancer.    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    Paroxysmal hemicrania    Essential (primary) hypertension 02/15/2018    White coat syndrome with hypertension    Hyperlipidemia 03/30/2016    Hypothyroidism 03/30/2016    Laceration without foreign body of scalp, initial encounter 01/15/2014    Laceration of scalp    Lobar pneumonia, unspecified organism (CMS-HCC) 09/18/2019    Lobar pneumonia    Lung cancer (Multi) 09/10/2020    Melanocytic nevi, unspecified 02/15/2018    Benign mole    Migraine with aura, not intractable, without status migrainosus 02/15/2018    Classic migraine with aura    Other specified disorders of bone density and structure, unspecified site 02/15/2018    Osteopenia    Pain in left ankle and joints of left foot     Chronic pain of left ankle    Pain in left ankle and joints of left foot 05/11/2017    Acute left ankle pain    Pain in left knee 05/11/2017    Acute pain of left knee    Personal history of other diseases of the musculoskeletal system and connective tissue 06/18/2015    History of muscle pain    Personal history of other diseases of the respiratory system 02/15/2018    History of acute sinusitis    Personal history of other endocrine, nutritional and metabolic disease 06/22/2016    History of vitamin D deficiency    Personal history of other specified conditions 09/16/2019    History of chronic cough    Personal history of other specified conditions 02/15/2018    History of shortness of breath    Pituitary tumor " 03/30/2016    Primary open-angle glaucoma, left eye, severe stage 10/26/2016    Primary open angle glaucoma of left eye, severe stage    Primary open-angle glaucoma, right eye, moderate stage 04/21/2022    Primary open angle glaucoma of right eye, moderate stage    Rupture of popliteal cyst 06/18/2015    Ruptured Bakers cyst    Unspecified asthma with (acute) exacerbation ([facility]) 09/16/2019    Acute asthma exacerbation    Unspecified blepharitis left eye, unspecified eyelid 10/12/2015    Blepharitis of left eye    Unspecified glaucoma 02/15/2018    Glaucoma      Past Surgical History:   Procedure Laterality Date    CATARACT EXTRACTION      OTHER SURGICAL HISTORY  10/12/2015    Anal Fissurectomy    TONSILLECTOMY  08/01/2014    Tonsillectomy      Allergies   Allergen Reactions    Amoxicillin Unknown    Atorvastatin Other     Hx heart palpitations AND SEVERE MUSCLE CRAMPS    Latex Other and Unknown     Patient denies    Simvastatin Unknown     SEVERE MUSCLE CRAMPS      Social History     Socioeconomic History    Marital status:    Tobacco Use    Smoking status: Former     Types: Cigarettes   Vaping Use    Vaping status: Never Used   Substance and Sexual Activity    Alcohol use: Never    Drug use: Never    Sexual activity: Defer      Physical Examination  General: no acute distress  HEENT: normocephalic, atraumatic  Heart: warm and well perfused  Lungs: breathing comfortably on room air  Abdomen: soft, nontender  Extremities: moving all extremities  Neuro: awake and conversant  Psych: appropriate mood and affect       Assessment &Plan    Hysteroscopy D&C  -Risks, benefits, and alternatives to surgery including risk of  bleeding, infection and risk of injury to nearby organs.  -Pt vocalizing agreeing with plan consents singed  -For OR this AM    D/w Dr. Regina Schwab MD PGY-4

## 2024-12-24 NOTE — ANESTHESIA POSTPROCEDURE EVALUATION
Patient: Herlinda Springer    Procedure Summary       Date: 12/24/24 Room / Location: [facility] OR 01 / Virtual [facility] OR    Anesthesia Start: 0926 Anesthesia Stop: 1012    Procedure: HYSTEROSCOPY, DIAGNOSTIC with Aveta Diagnosis:       Thickened endometrium      (Thickened endometrium [R93.89])    Surgeons: Ramandeep Tapia MD Responsible Provider: Jacky Osei MD    Anesthesia Type: general ASA Status: 3            Anesthesia Type: general    Vitals Value Taken Time   /74 12/24/24 1025   Temp 36.6 °C (97.9 °F) 12/24/24 1020   Pulse 77 12/24/24 1030   Resp 13 12/24/24 1030   SpO2 98 % 12/24/24 1030   Vitals shown include unfiled device data.    Anesthesia Post Evaluation    Patient location during evaluation: PACU  Patient participation: complete - patient participated  Level of consciousness: awake and awake and alert  Pain score: 0  Pain management: adequate  Multimodal analgesia pain management approach  Airway patency: patent  Cardiovascular status: acceptable, blood pressure returned to baseline and hemodynamically stable  Respiratory status: acceptable, airway suctioned and face mask  Hydration status: acceptable  Postoperative Nausea and Vomiting: none        No notable events documented.

## 2024-12-24 NOTE — H&P
"78yo female presenting for hysteroscopy, D&C in the setting of thickened endometrium on imaging surveillance for lung cancer.    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    Paroxysmal hemicrania    Essential (primary) hypertension 02/15/2018    White coat syndrome with hypertension    Hyperlipidemia 03/30/2016    Hypothyroidism 03/30/2016    Laceration without foreign body of scalp, initial encounter 01/15/2014    Laceration of scalp    Lobar pneumonia, unspecified organism (CMS-HCC) 09/18/2019    Lobar pneumonia    Lung cancer (Multi) 09/10/2020    Melanocytic nevi, unspecified 02/15/2018    Benign mole    Migraine with aura, not intractable, without status migrainosus 02/15/2018    Classic migraine with aura    Other specified disorders of bone density and structure, unspecified site 02/15/2018    Osteopenia    Pain in left ankle and joints of left foot     Chronic pain of left ankle    Pain in left ankle and joints of left foot 05/11/2017    Acute left ankle pain    Pain in left knee 05/11/2017    Acute pain of left knee    Personal history of other diseases of the musculoskeletal system and connective tissue 06/18/2015    History of muscle pain    Personal history of other diseases of the respiratory system 02/15/2018    History of acute sinusitis    Personal history of other endocrine, nutritional and metabolic disease 06/22/2016    History of vitamin D deficiency    Personal history of other specified conditions 09/16/2019    History of chronic cough    Personal history of other specified conditions 02/15/2018    History of shortness of breath    Pituitary tumor " 03/30/2016    Primary open-angle glaucoma, left eye, severe stage 10/26/2016    Primary open angle glaucoma of left eye, severe stage    Primary open-angle glaucoma, right eye, moderate stage 04/21/2022    Primary open angle glaucoma of right eye, moderate stage    Rupture of popliteal cyst 06/18/2015    Ruptured Bakers cyst    Unspecified asthma with (acute) exacerbation ([facility]) 09/16/2019    Acute asthma exacerbation    Unspecified blepharitis left eye, unspecified eyelid 10/12/2015    Blepharitis of left eye    Unspecified glaucoma 02/15/2018    Glaucoma      Past Surgical History:   Procedure Laterality Date    CATARACT EXTRACTION      OTHER SURGICAL HISTORY  10/12/2015    Anal Fissurectomy    TONSILLECTOMY  08/01/2014    Tonsillectomy      Allergies   Allergen Reactions    Amoxicillin Unknown    Atorvastatin Other     Hx heart palpitations AND SEVERE MUSCLE CRAMPS    Latex Other and Unknown     Patient denies    Simvastatin Unknown     SEVERE MUSCLE CRAMPS      Social History     Socioeconomic History    Marital status:    Tobacco Use    Smoking status: Former     Types: Cigarettes   Vaping Use    Vaping status: Never Used   Substance and Sexual Activity    Alcohol use: Never    Drug use: Never    Sexual activity: Defer      Physical Examination  General: no acute distress  HEENT: normocephalic, atraumatic  Heart: warm and well perfused  Lungs: breathing comfortably on room air  Abdomen: soft, nontender  Extremities: moving all extremities  Neuro: awake and conversant  Psych: appropriate mood and affect       Assessment &Plan    Hysteroscopy D&C  -Risks, benefits, and alternatives to surgery including risk of  bleeding, infection and risk of injury to nearby organs.  -Pt vocalizing agreeing with plan consents singed  -For OR this AM    D/w Dr. Regina Schwab MD PGY-4

## 2024-12-26 ENCOUNTER — APPOINTMENT (OUTPATIENT)
Dept: OPHTHALMOLOGY | Facility: CLINIC | Age: 79
End: 2024-12-26
Payer: MEDICARE

## 2024-12-27 ENCOUNTER — TELEPHONE (OUTPATIENT)
Dept: HEMATOLOGY/ONCOLOGY | Facility: HOSPITAL | Age: 79
End: 2024-12-27
Payer: MEDICARE

## 2024-12-27 ENCOUNTER — TELEPHONE (OUTPATIENT)
Dept: OBSTETRICS AND GYNECOLOGY | Facility: CLINIC | Age: 79
End: 2024-12-27
Payer: MEDICARE

## 2024-12-27 NOTE — TELEPHONE ENCOUNTER
Per Dr. Huang:    It is not too early to have her blood work done on 1/3, her last blood work was on 12/20.  It is fine for her to see darrell on 1/6. She can also just get her blood work on 1/6 when she is here.    Called Herlinda back to relay the message as above. Understanding verbalized with teach back. No further needs at this time,

## 2024-12-27 NOTE — TELEPHONE ENCOUNTER
Pt states that she last saw Isabel Barton CNP for FUV and was told to make an appointment to see Dr. Huang on 1/6. Pt stopped at the scheduling desk and was told that Dr. Huang was not available that day so she made another appt to see Isabel Barton CNP on 1/6. She was instructed to get blood work done on 1/3 but she feels that is too soon from her last blood work which she states showed a decrease in her WBC's. She believes that we should be waiting at least another week to see if her WBC's continue to decrease,    Message sent to team.

## 2024-12-27 NOTE — TELEPHONE ENCOUNTER
Patient identified name and date of birth prior to nurse addressing patients concern of still having small amount of bleeding. Nurse reassured patient that the small amount of bleeding that she's experiencing 3 days post hysteroscopy is normal but heavy bleeding and saturating a pad an hour for two consecutive hours would be of concern. Patient has a scheduled Hematology appt 1/6/2025 and nurse encouraged patient to bring up concerns at that appointment if she is still experiencing bleeding. Patient also has an OBGYN appt 1/16/2025. Patient verbalized understanding of this conversation and stated that she had no further questions or concerns at this time.

## 2024-12-28 PROCEDURE — RXMED WILLOW AMBULATORY MEDICATION CHARGE

## 2024-12-31 LAB
LABORATORY COMMENT REPORT: NORMAL
PATH REPORT.FINAL DX SPEC: NORMAL
PATH REPORT.GROSS SPEC: NORMAL
PATH REPORT.RELEVANT HX SPEC: NORMAL
PATH REPORT.TOTAL CANCER: NORMAL

## 2025-01-03 ENCOUNTER — LAB (OUTPATIENT)
Dept: LAB | Facility: HOSPITAL | Age: 80
End: 2025-01-03
Payer: MEDICARE

## 2025-01-03 ENCOUNTER — PHARMACY VISIT (OUTPATIENT)
Dept: PHARMACY | Facility: CLINIC | Age: 80
End: 2025-01-03
Payer: MEDICARE

## 2025-01-03 DIAGNOSIS — C91.10 CLL (CHRONIC LYMPHOCYTIC LEUKEMIA) (MULTI): ICD-10-CM

## 2025-01-03 LAB
ALBUMIN SERPL BCP-MCNC: 4.3 G/DL (ref 3.4–5)
ALP SERPL-CCNC: 65 U/L (ref 33–136)
ALT SERPL W P-5'-P-CCNC: 11 U/L (ref 7–45)
ANION GAP SERPL CALC-SCNC: 12 MMOL/L (ref 10–20)
AST SERPL W P-5'-P-CCNC: 15 U/L (ref 9–39)
BASOPHILS # BLD AUTO: 0.07 X10*3/UL (ref 0–0.1)
BASOPHILS NFR BLD AUTO: 0.1 %
BILIRUB SERPL-MCNC: 0.4 MG/DL (ref 0–1.2)
BUN SERPL-MCNC: 17 MG/DL (ref 6–23)
CALCIUM SERPL-MCNC: 9.1 MG/DL (ref 8.6–10.3)
CHLORIDE SERPL-SCNC: 104 MMOL/L (ref 98–107)
CO2 SERPL-SCNC: 28 MMOL/L (ref 21–32)
CREAT SERPL-MCNC: 1.03 MG/DL (ref 0.5–1.05)
DACRYOCYTES BLD QL SMEAR: NORMAL
EGFRCR SERPLBLD CKD-EPI 2021: 55 ML/MIN/1.73M*2
EOSINOPHIL # BLD AUTO: 0.46 X10*3/UL (ref 0–0.4)
EOSINOPHIL NFR BLD AUTO: 0.4 %
ERYTHROCYTE [DISTWIDTH] IN BLOOD BY AUTOMATED COUNT: 15.4 % (ref 11.5–14.5)
GLUCOSE SERPL-MCNC: 92 MG/DL (ref 74–99)
HCT VFR BLD AUTO: 34.9 % (ref 36–46)
HGB BLD-MCNC: 10.9 G/DL (ref 12–16)
HYPOCHROMIA BLD QL SMEAR: NORMAL
IMM GRANULOCYTES # BLD AUTO: 0.26 X10*3/UL (ref 0–0.5)
IMM GRANULOCYTES NFR BLD AUTO: 0.2 % (ref 0–0.9)
LDH SERPL L TO P-CCNC: 123 U/L (ref 84–246)
LYMPHOCYTES # BLD AUTO: 122.9 X10*3/UL (ref 0.8–3)
LYMPHOCYTES NFR BLD AUTO: 95.3 %
MAGNESIUM SERPL-MCNC: 1.69 MG/DL (ref 1.6–2.4)
MCH RBC QN AUTO: 29.1 PG (ref 26–34)
MCHC RBC AUTO-ENTMCNC: 31.2 G/DL (ref 32–36)
MCV RBC AUTO: 93 FL (ref 80–100)
MONOCYTES # BLD AUTO: 2.01 X10*3/UL (ref 0.05–0.8)
MONOCYTES NFR BLD AUTO: 1.6 %
NEUTROPHILS # BLD AUTO: 3.28 X10*3/UL (ref 1.6–5.5)
NEUTROPHILS NFR BLD AUTO: 2.4 %
NRBC BLD-RTO: 0 /100 WBCS (ref 0–0)
OVALOCYTES BLD QL SMEAR: NORMAL
PLATELET # BLD AUTO: 154 X10*3/UL (ref 150–450)
PLATELET CLUMP BLD QL SMEAR: PRESENT
POTASSIUM SERPL-SCNC: 3.9 MMOL/L (ref 3.5–5.3)
PROT SERPL-MCNC: 6.3 G/DL (ref 6.4–8.2)
RBC # BLD AUTO: 3.74 X10*6/UL (ref 4–5.2)
RBC MORPH BLD: NORMAL
SCHISTOCYTES BLD QL SMEAR: NORMAL
SODIUM SERPL-SCNC: 140 MMOL/L (ref 136–145)
URATE SERPL-MCNC: 5.1 MG/DL (ref 2.3–6.7)
WBC # BLD AUTO: 129 X10*3/UL (ref 4.4–11.3)

## 2025-01-03 PROCEDURE — 80053 COMPREHEN METABOLIC PANEL: CPT

## 2025-01-03 PROCEDURE — 83615 LACTATE (LD) (LDH) ENZYME: CPT

## 2025-01-03 PROCEDURE — 84550 ASSAY OF BLOOD/URIC ACID: CPT

## 2025-01-03 PROCEDURE — 36415 COLL VENOUS BLD VENIPUNCTURE: CPT

## 2025-01-03 PROCEDURE — 85025 COMPLETE CBC W/AUTO DIFF WBC: CPT

## 2025-01-03 PROCEDURE — 83735 ASSAY OF MAGNESIUM: CPT

## 2025-01-05 ASSESSMENT — ENCOUNTER SYMPTOMS
RESPIRATORY NEGATIVE: 1
UNEXPECTED WEIGHT CHANGE: 0
APPETITE CHANGE: 0
ROS SKIN COMMENTS: BRUISING
CONSTIPATION: 1
FATIGUE: 0
NEUROLOGICAL NEGATIVE: 1
CHILLS: 0
DIAPHORESIS: 0
MUSCULOSKELETAL NEGATIVE: 1
FEVER: 0
CARDIOVASCULAR NEGATIVE: 1

## 2025-01-06 ENCOUNTER — SPECIALTY PHARMACY (OUTPATIENT)
Dept: HEMATOLOGY/ONCOLOGY | Facility: HOSPITAL | Age: 80
End: 2025-01-06
Payer: MEDICARE

## 2025-01-06 ENCOUNTER — OFFICE VISIT (OUTPATIENT)
Dept: HEMATOLOGY/ONCOLOGY | Facility: HOSPITAL | Age: 80
End: 2025-01-06
Payer: MEDICARE

## 2025-01-06 VITALS
DIASTOLIC BLOOD PRESSURE: 50 MMHG | WEIGHT: 122.8 LBS | RESPIRATION RATE: 16 BRPM | SYSTOLIC BLOOD PRESSURE: 129 MMHG | HEART RATE: 98 BPM | OXYGEN SATURATION: 100 % | TEMPERATURE: 97.2 F | BODY MASS INDEX: 22.46 KG/M2

## 2025-01-06 DIAGNOSIS — C91.10 CLL (CHRONIC LYMPHOCYTIC LEUKEMIA) (MULTI): Primary | ICD-10-CM

## 2025-01-06 DIAGNOSIS — K13.79 MOUTH SORES: ICD-10-CM

## 2025-01-06 DIAGNOSIS — R93.89 THICKENED ENDOMETRIUM: ICD-10-CM

## 2025-01-06 PROCEDURE — 3074F SYST BP LT 130 MM HG: CPT

## 2025-01-06 PROCEDURE — 1159F MED LIST DOCD IN RCRD: CPT

## 2025-01-06 PROCEDURE — 3078F DIAST BP <80 MM HG: CPT

## 2025-01-06 PROCEDURE — 1126F AMNT PAIN NOTED NONE PRSNT: CPT

## 2025-01-06 PROCEDURE — 99214 OFFICE O/P EST MOD 30 MIN: CPT

## 2025-01-06 PROCEDURE — 1160F RVW MEDS BY RX/DR IN RCRD: CPT

## 2025-01-06 ASSESSMENT — ENCOUNTER SYMPTOMS
HEMATOLOGIC/LYMPHATIC NEGATIVE: 1
DYSURIA: 0

## 2025-01-06 ASSESSMENT — PAIN SCALES - GENERAL: PAINLEVEL_OUTOF10: 0-NO PAIN

## 2025-01-06 NOTE — PROGRESS NOTES
[facility] Clinical Note    Herlinda Springer is a 79 y.o. female, who is on the specialty pharmacy service for management of: Oncology Core with status of: (Enrolled)     Herlinda was contacted on 1/6/2025.    Refer to the encounter summary report for documentation details about patient counseling and education.      Medication Adherence  The importance of adherence was discussed with the patient and they were advised to take the medication as prescribed by their provider. Herlinda was encouraged to call her physician's office if they have a question regarding a missed dose.       Patient advised to contact the pharmacy if there are any changes to her medication list, including prescriptions, OTC medications, herbal products, or supplements. Patient was advised of [facility]’s dispensing process, refill timeline, contact information ([phone]), and patient management follow up. Patient confirmed understanding of education conducted during assessment. All patient questions and concerns were addressed to the best of my ability. Patient was encouraged to contact the specialty pharmacy with any questions or concerns.    Confirmed follow-up outreaches are properly scheduled. Reviewed goals of therapy in the program targets.    Davi Muniz, PharmD

## 2025-01-07 PROBLEM — K13.79 MOUTH SORES: Status: ACTIVE | Noted: 2025-01-07

## 2025-01-08 ENCOUNTER — APPOINTMENT (OUTPATIENT)
Dept: OPHTHALMOLOGY | Facility: CLINIC | Age: 80
End: 2025-01-08
Payer: MEDICARE

## 2025-01-08 DIAGNOSIS — H40.1122 PRIMARY OPEN ANGLE GLAUCOMA OF LEFT EYE, MODERATE STAGE: Primary | ICD-10-CM

## 2025-01-08 DIAGNOSIS — H40.1131 PRIMARY OPEN-ANGLE GLAUCOMA, BILATERAL, MILD STAGE: ICD-10-CM

## 2025-01-08 DIAGNOSIS — Z96.1 PSEUDOPHAKIA OF BOTH EYES: ICD-10-CM

## 2025-01-08 PROCEDURE — 92020 GONIOSCOPY: CPT | Performed by: OPHTHALMOLOGY

## 2025-01-08 PROCEDURE — 99213 OFFICE O/P EST LOW 20 MIN: CPT | Performed by: OPHTHALMOLOGY

## 2025-01-08 ASSESSMENT — GONIOSCOPY
OD_SUPERIOR: D45R 1+ PTM
OS_INFERIOR: D45R 1+ PTM
OD_TEMPORAL: D45R 1+ PTM
OS_TEMPORAL: D45R 1+ PTM
OD_INFERIOR: D45R 1+ PTM
OS_SUPERIOR: D45R 1+ PTM

## 2025-01-08 ASSESSMENT — ENCOUNTER SYMPTOMS
NEUROLOGICAL NEGATIVE: 0
CARDIOVASCULAR NEGATIVE: 0
EYES NEGATIVE: 0
HEMATOLOGIC/LYMPHATIC NEGATIVE: 0
MUSCULOSKELETAL NEGATIVE: 0
GASTROINTESTINAL NEGATIVE: 0
PSYCHIATRIC NEGATIVE: 0
RESPIRATORY NEGATIVE: 0
CONSTITUTIONAL NEGATIVE: 0
ENDOCRINE NEGATIVE: 0
ALLERGIC/IMMUNOLOGIC NEGATIVE: 0

## 2025-01-08 ASSESSMENT — TONOMETRY
OD_IOP_MMHG: 13
OS_IOP_MMHG: 14
IOP_METHOD: GOLDMANN APPLANATION

## 2025-01-08 ASSESSMENT — VISUAL ACUITY
OD_CC+: SLOW
OS_CC+: -1
METHOD: SNELLEN - LINEAR
OS_CC: 20/20
OD_CC: 20/20

## 2025-01-08 ASSESSMENT — SLIT LAMP EXAM - LIDS
COMMENTS: GOOD POSITION
COMMENTS: GOOD POSITION

## 2025-01-08 ASSESSMENT — EXTERNAL EXAM - RIGHT EYE: OD_EXAM: NORMAL

## 2025-01-08 ASSESSMENT — EXTERNAL EXAM - LEFT EYE: OS_EXAM: NORMAL

## 2025-01-08 ASSESSMENT — PACHYMETRY
OD_CT(UM): 572
OS_CT(UM): 567

## 2025-01-08 NOTE — PROGRESS NOTES
Tonometry       Tonometry (Goldmann Applanation, 10:41 AM)         Right Left    Pressure 13 14                  Assessment/Plan   Last dilated:  8/15/24  Last gonio 1/8/25 OU:  D45r 1+ PTM (hydrus nasally with PAS to antrum)    Pt diagnosed with Stage 4 lung CA since Nov 2019 - doing well (just on Ketruda now)    1.  Early Primary Open-Angle Glaucoma OU:  /570.  IOP has some fluctuation, but there has been years of stable VFs and no evidence of progression.  Pt more comfortable OD off the zioptan.  s/p CE+IOL+Hydrus OD 6/7/22 (preop VA 20/60, IOP 12 mmHg).  s/p CE+IOL+Hydrus OS 7/20/22:  pre-op VA 20/30 glare to 20/50 and IOP 13 mmhg.      IOPs are well controlled without medication (previously tolerated zioptan)      Plan:  cont no Rx                f/u 4 months     2.  Pseudophakia (PCIOL) OU:  very early PCO forming, but would not advise YAG cap as she is minimally symptomatic      Plan:  as above    3.  h/o narrow angles OU:  s/p LPI OS 4/17/21 and OD 6/25/21.  patent LPIs with deepening of angles      Plan:  monitor

## 2025-01-15 ENCOUNTER — TELEPHONE (OUTPATIENT)
Dept: ADMISSION | Facility: HOSPITAL | Age: 80
End: 2025-01-15
Payer: MEDICARE

## 2025-01-15 DIAGNOSIS — F41.9 ANXIETY: ICD-10-CM

## 2025-01-15 RX ORDER — ALPRAZOLAM 0.5 MG/1
0.5 TABLET ORAL 3 TIMES DAILY PRN
Qty: 90 TABLET | Refills: 0 | Status: SHIPPED | OUTPATIENT
Start: 2025-01-15 | End: 2025-02-14

## 2025-01-16 ENCOUNTER — OFFICE VISIT (OUTPATIENT)
Dept: OBSTETRICS AND GYNECOLOGY | Facility: HOSPITAL | Age: 80
End: 2025-01-16
Payer: MEDICARE

## 2025-01-16 VITALS — SYSTOLIC BLOOD PRESSURE: 126 MMHG | WEIGHT: 122 LBS | BODY MASS INDEX: 22.31 KG/M2 | DIASTOLIC BLOOD PRESSURE: 65 MMHG

## 2025-01-16 DIAGNOSIS — R93.89 THICKENED ENDOMETRIUM: Primary | ICD-10-CM

## 2025-01-16 PROCEDURE — 99211 OFF/OP EST MAY X REQ PHY/QHP: CPT | Performed by: OBSTETRICS & GYNECOLOGY

## 2025-01-16 PROCEDURE — 1159F MED LIST DOCD IN RCRD: CPT | Performed by: OBSTETRICS & GYNECOLOGY

## 2025-01-16 PROCEDURE — 1126F AMNT PAIN NOTED NONE PRSNT: CPT | Performed by: OBSTETRICS & GYNECOLOGY

## 2025-01-16 PROCEDURE — 3078F DIAST BP <80 MM HG: CPT | Performed by: OBSTETRICS & GYNECOLOGY

## 2025-01-16 PROCEDURE — 3074F SYST BP LT 130 MM HG: CPT | Performed by: OBSTETRICS & GYNECOLOGY

## 2025-01-16 ASSESSMENT — PAIN SCALES - GENERAL: PAINLEVEL_OUTOF10: 0-NO PAIN

## 2025-01-16 NOTE — PROGRESS NOTES
"SUBJECTIVE    79 y.o.  Postmenopausal presents for postoperative visit. She had a hysteroscopic polypectomy on 24    Reports approximately two weeks of light bleeding after the procedure. Which has stopped at this point. Otherwise fully recovered.     OBJECTIVE  Vitals:    25 0839   BP: 126/65   Weight: 55.3 kg (122 lb)     Body mass index is 22.31 kg/m².     Physical Exam  Constitutional:       Appearance: Normal appearance.   HENT:      Head: Normocephalic and atraumatic.      Nose: Nose normal.      Mouth/Throat:      Mouth: Mucous membranes are moist.   Eyes:      Extraocular Movements: Extraocular movements intact.      Pupils: Pupils are equal, round, and reactive to light.   Cardiovascular:      Rate and Rhythm: Normal rate.   Pulmonary:      Effort: Pulmonary effort is normal.   Musculoskeletal:         General: Normal range of motion.      Cervical back: Normal range of motion.   Neurological:      General: No focal deficit present.      Mental Status: She is alert and oriented to person, place, and time.   Skin:     General: Skin is warm and dry.   Psychiatric:         Mood and Affect: Mood normal.         Behavior: Behavior normal.   Vitals and nursing note reviewed.        ASSESSMENT & PLAN  Problem List Items Addressed This Visit          Ob-Gyn Problems    Thickened endometrium - Primary    Overview     - Thickened endometrium on CT surveillance for lung cancer, incidental   - Pelvic ultrasound 12/10: \"Thickened heterogeneous with internal vascularity measuring up to 1.5 cm\"   - Discussed options for endometrial sampling including in office endometrial biopsy and hysteroscopy  - Patient prefers hysteroscopy - will place case request         Current Assessment & Plan     - Pathology showed endometrial polyp          Follow up: As needed    Ramandeep Tapia MD  Obstetrics & Gynecology  25   "

## 2025-01-27 ENCOUNTER — SPECIALTY PHARMACY (OUTPATIENT)
Dept: PHARMACY | Facility: CLINIC | Age: 80
End: 2025-01-27

## 2025-01-27 PROCEDURE — RXMED WILLOW AMBULATORY MEDICATION CHARGE

## 2025-01-29 ENCOUNTER — PHARMACY VISIT (OUTPATIENT)
Dept: PHARMACY | Facility: CLINIC | Age: 80
End: 2025-01-29
Payer: COMMERCIAL

## 2025-02-06 ENCOUNTER — HOSPITAL ENCOUNTER (OUTPATIENT)
Dept: RADIOLOGY | Facility: HOSPITAL | Age: 80
Discharge: HOME | End: 2025-02-06
Payer: MEDICARE

## 2025-02-06 DIAGNOSIS — C34.11 PRIMARY MALIGNANT NEOPLASM OF RIGHT UPPER LOBE OF LUNG (MULTI): ICD-10-CM

## 2025-02-06 PROCEDURE — 2550000001 HC RX 255 CONTRASTS: Performed by: INTERNAL MEDICINE

## 2025-02-06 PROCEDURE — 71260 CT THORAX DX C+: CPT

## 2025-02-06 RX ADMIN — IOHEXOL 75 ML: 350 INJECTION, SOLUTION INTRAVENOUS at 09:31

## 2025-02-12 ENCOUNTER — DOCUMENTATION (OUTPATIENT)
Dept: HEMATOLOGY/ONCOLOGY | Facility: HOSPITAL | Age: 80
End: 2025-02-12
Payer: MEDICARE

## 2025-02-12 ENCOUNTER — LAB (OUTPATIENT)
Dept: LAB | Facility: HOSPITAL | Age: 80
End: 2025-02-12
Payer: MEDICARE

## 2025-02-12 ENCOUNTER — OFFICE VISIT (OUTPATIENT)
Dept: HEMATOLOGY/ONCOLOGY | Facility: HOSPITAL | Age: 80
End: 2025-02-12
Payer: MEDICARE

## 2025-02-12 VITALS
SYSTOLIC BLOOD PRESSURE: 135 MMHG | BODY MASS INDEX: 22.58 KG/M2 | OXYGEN SATURATION: 100 % | RESPIRATION RATE: 20 BRPM | DIASTOLIC BLOOD PRESSURE: 53 MMHG | WEIGHT: 123.46 LBS | TEMPERATURE: 98.6 F | HEART RATE: 94 BPM

## 2025-02-12 DIAGNOSIS — C34.11 PRIMARY MALIGNANT NEOPLASM OF RIGHT UPPER LOBE OF LUNG (MULTI): ICD-10-CM

## 2025-02-12 DIAGNOSIS — C91.10 CLL (CHRONIC LYMPHOCYTIC LEUKEMIA) (MULTI): ICD-10-CM

## 2025-02-12 DIAGNOSIS — F41.9 ANXIETY: ICD-10-CM

## 2025-02-12 LAB
ALBUMIN SERPL BCP-MCNC: 4.2 G/DL (ref 3.4–5)
ALP SERPL-CCNC: 56 U/L (ref 33–136)
ALT SERPL W P-5'-P-CCNC: 10 U/L (ref 7–45)
ANION GAP SERPL CALC-SCNC: 13 MMOL/L (ref 10–20)
AST SERPL W P-5'-P-CCNC: 13 U/L (ref 9–39)
BASOPHILS # BLD MANUAL: 0 X10*3/UL (ref 0–0.1)
BASOPHILS NFR BLD MANUAL: 0 %
BILIRUB SERPL-MCNC: 0.4 MG/DL (ref 0–1.2)
BUN SERPL-MCNC: 19 MG/DL (ref 6–23)
BURR CELLS BLD QL SMEAR: ABNORMAL
CALCIUM SERPL-MCNC: 8.9 MG/DL (ref 8.6–10.3)
CHLORIDE SERPL-SCNC: 102 MMOL/L (ref 98–107)
CO2 SERPL-SCNC: 28 MMOL/L (ref 21–32)
CREAT SERPL-MCNC: 1.07 MG/DL (ref 0.5–1.05)
EGFRCR SERPLBLD CKD-EPI 2021: 52 ML/MIN/1.73M*2
EOSINOPHIL # BLD MANUAL: 1.75 X10*3/UL (ref 0–0.4)
EOSINOPHIL NFR BLD MANUAL: 2 %
ERYTHROCYTE [DISTWIDTH] IN BLOOD BY AUTOMATED COUNT: 13.2 % (ref 11.5–14.5)
GLUCOSE SERPL-MCNC: 102 MG/DL (ref 74–99)
HCT VFR BLD AUTO: 34.7 % (ref 36–46)
HGB BLD-MCNC: 10.8 G/DL (ref 12–16)
HYPOCHROMIA BLD QL SMEAR: ABNORMAL
IMM GRANULOCYTES # BLD AUTO: 0.2 X10*3/UL (ref 0–0.5)
IMM GRANULOCYTES NFR BLD AUTO: 0.2 % (ref 0–0.9)
LDH SERPL L TO P-CCNC: 119 U/L (ref 84–246)
LYMPHOCYTES # BLD MANUAL: 75.34 X10*3/UL (ref 0.8–3)
LYMPHOCYTES NFR BLD MANUAL: 86 %
MCH RBC QN AUTO: 29.6 PG (ref 26–34)
MCHC RBC AUTO-ENTMCNC: 31.1 G/DL (ref 32–36)
MCV RBC AUTO: 95 FL (ref 80–100)
MONOCYTES # BLD MANUAL: 1.75 X10*3/UL (ref 0.05–0.8)
MONOCYTES NFR BLD MANUAL: 2 %
NEUTS SEG # BLD MANUAL: 5.26 X10*3/UL (ref 1.6–5)
NEUTS SEG NFR BLD MANUAL: 6 %
NRBC BLD-RTO: 0 /100 WBCS (ref 0–0)
OVALOCYTES BLD QL SMEAR: ABNORMAL
PLATELET # BLD AUTO: 147 X10*3/UL (ref 150–450)
PLATELET CLUMP BLD QL SMEAR: PRESENT
POTASSIUM SERPL-SCNC: 4 MMOL/L (ref 3.5–5.3)
PROT SERPL-MCNC: 6.4 G/DL (ref 6.4–8.2)
RBC # BLD AUTO: 3.65 X10*6/UL (ref 4–5.2)
RBC MORPH BLD: ABNORMAL
SODIUM SERPL-SCNC: 139 MMOL/L (ref 136–145)
TOTAL CELLS COUNTED BLD: 100
VARIANT LYMPHS # BLD MANUAL: 3.5 X10*3/UL (ref 0–0.3)
VARIANT LYMPHS NFR BLD: 4 %
WBC # BLD AUTO: 87.6 X10*3/UL (ref 4.4–11.3)

## 2025-02-12 PROCEDURE — 3075F SYST BP GE 130 - 139MM HG: CPT | Performed by: INTERNAL MEDICINE

## 2025-02-12 PROCEDURE — 85007 BL SMEAR W/DIFF WBC COUNT: CPT

## 2025-02-12 PROCEDURE — 80053 COMPREHEN METABOLIC PANEL: CPT

## 2025-02-12 PROCEDURE — 3078F DIAST BP <80 MM HG: CPT | Performed by: INTERNAL MEDICINE

## 2025-02-12 PROCEDURE — 85027 COMPLETE CBC AUTOMATED: CPT

## 2025-02-12 PROCEDURE — 99213 OFFICE O/P EST LOW 20 MIN: CPT | Performed by: INTERNAL MEDICINE

## 2025-02-12 PROCEDURE — 36415 COLL VENOUS BLD VENIPUNCTURE: CPT

## 2025-02-12 PROCEDURE — 83615 LACTATE (LD) (LDH) ENZYME: CPT

## 2025-02-12 PROCEDURE — 1126F AMNT PAIN NOTED NONE PRSNT: CPT | Performed by: INTERNAL MEDICINE

## 2025-02-12 PROCEDURE — 1159F MED LIST DOCD IN RCRD: CPT | Performed by: INTERNAL MEDICINE

## 2025-02-12 PROCEDURE — G2211 COMPLEX E/M VISIT ADD ON: HCPCS | Performed by: INTERNAL MEDICINE

## 2025-02-12 RX ORDER — ALPRAZOLAM 0.5 MG/1
0.5 TABLET ORAL 3 TIMES DAILY PRN
Qty: 90 TABLET | Refills: 0 | Status: SHIPPED | OUTPATIENT
Start: 2025-02-12 | End: 2025-03-14

## 2025-02-12 ASSESSMENT — ENCOUNTER SYMPTOMS
RESPIRATORY NEGATIVE: 1
FATIGUE: 0
APPETITE CHANGE: 0
CHILLS: 0
UNEXPECTED WEIGHT CHANGE: 0
DIAPHORESIS: 0
NEUROLOGICAL NEGATIVE: 1
CARDIOVASCULAR NEGATIVE: 1
FEVER: 0
ROS SKIN COMMENTS: BRUISING
CONSTIPATION: 1
MUSCULOSKELETAL NEGATIVE: 1

## 2025-02-12 ASSESSMENT — PAIN SCALES - GENERAL: PAINLEVEL_OUTOF10: 0-NO PAIN

## 2025-02-12 NOTE — PROGRESS NOTES
Notified by Kelly in Lab Service that WBC 87.6. Secure Chat sent to ordering provider Isabel Barton NP and Dr. King who is seeing the patient today.

## 2025-02-12 NOTE — PROGRESS NOTES
Patient ID: Herlinda Springer is a 79 y.o. female    Primary Care Provider: Alley Bui MD    DIAGNOSIS AND STAGING  Stage CLIFTON ([id number]) NSCLC (adenocarcinoma, TTF-1+, Napsin A+) of the RUL   Dx through left adrenal gland bx on 12/17/19     SITES OF DISEASE  Right upper lobe  Left adrenal gland   Brain - left parietal lobe      MOLECULAR GENOMICS  TP53 mutant   EGFR negative  ALK-1 negative   ROS1 negative  BRAF V600E negative  NTRK negative      PD-L1 TPS < 1%     PRIOR THERAPIES  Cycle # 1 carboplatin/pemetrexed/pembrolizumab - 01/15/20, cycle # 4 on 03/18/20  Starts maintenance pemetrexed/pembrolizumab on 04/08/20  Pemetrexed discontinued on 03/01/2023 due to decline GFR'   Last pembrolizumab dose on 08/02/2023     CURRENT THERAPY  Observation     CURRENT ONCOLOGICAL PROBLEMS  MRI brain 12/22/19 - 2 mm enhancing lesion left parietal lobe, unclear if metastasis - repeat MRI 06/26/20 showed resolution of that finding   CT 06/26/20 shows oligo-progression of RUL - left adrenal metastasis stable - s/p RT to the RUL - completed 08/19/20     HISTORY OF PRESENT ILLNESS  This is a former smoker (quit 25 years prior to dx)who presented with new onset respiratory sx (cough), decreased appetite and weight loss  (lost 13 lbs in 2 months).   Chest CT 11/07/19 showed a RUL nodule measuring 4 cm, mediastinal adenopathy (level 2L measuring 1.8 cm, .   A PET scan obtained on 12/03/19 showed FDG uptake in the RUL nodule as well as 1R/1L nodes, bilateral mediastinal and right hilar node. Left adrenal gland was hypermetabolic, c/w metastasis.  On 12/17/19 CT-guided biopsy of the left adrenal gland confirmed the presence of a mucinous adenocarcinoma, TTF-1 positive, Napsin A and CK7 also positive. PD-L1 TPS < 1%, with no actionable mutations, TP53 mutant.   MRI brain 12/22/19 showed a 2 mm enhancement in left temporal lobe that is nonspecific but could be an additional metastatic site.   01/15/20: cycle # 1  carbo/pemetrexed/pembrolizumab   03/18/20: Cycle # 4 carbo/pemetrexed/pembrolizumab   04/08/20: starts maintenance pemetrexed/pembrolizumab   05/26/20: MRI brain shows no intracranial disease  05/26/20: CT C/A/P: increase in size of RUL mass - now measuring ~ 3.2 cm and stability of left adrenal gland - referred to Rad Onc for consideration of SBRT - continues pemetrexed/pembrolizumab maintenance   08/19/20:  completes RT to the RUL  08/20/20: grade 1 rash b/l UEs - treated with topical triamcinolone - continue pemetrexed + pembrolizumab  10/16/20: CT C/A/P: Increased consolidative changes  in the RUL c/w RT-induced fibrosis - no LAD. The adrenal glands look stable - the multiple lung GG opacifications are stable   11/27/20: CT C/A/P - improvement on RUL interstitial changes - there is no sign of PD. The RUL mass has decreased in size from 7 to 5 cm in greatest diameter - left adrenal gland is stable in size.   02/19/21: CT C/A/P personally reviewed by me: the RUL apical lesion that has been irradiated is further  decreased in size, now measuring 3.9 cm x 2 cm and previously on 11/24/21 measuring 4.8 cm x 2.4 cm (solid component). The left adrenal mass measures  3.4 cm and is stable in size. There are no new concerning metastatic lesions,. Multiple areas of GGO remain and are of no clinical significance in this patients with metastatic disease.   05/14/21: CT Chest/abdomen/pelvis personally reviewed by me. The RUL mass remains stable and so do the nodes. However, the GGO opacifications persist - the left adrenal gland is slightly smaller (3.6 cm << 3.8 cm). The right adrenal gland remains stable.      6/30/21: Will continue maintenance pembrolizumab and pemetrexed. Given CrCl, will dose Pemetrexed at 375 mg/m2 today.      08/06/21: CT C/A/P shows further development of interstitial changes in the RUL that look purely inflammatory in nature - there are no concerns for PD - no worsening LAD, the left adrenal mass  remains stable at 3.6 cm. This adrenal gland has been biopsied  at her dx      12/09/21: CT C/A/P shows SD - the RUL changes from prior RT remain stable - no new emerging LAD. Adrenal glands stable      12/15/21: dopplers LE negative for DVT      03/04/2022: CT Chest/Abdomen/Pelvis shows no signs of PD with the exception of left adrenal gland that continues to measure 3.4 cm however has developed an area of necrosis that is  concerning. Both RUL and LLL GGO remain stable measuring 2.7 cm and 2.8 cm respectively      03/25/2022: PET scan demonstrates close to metabolic complete remission, left adrenal gland SUV has decreased from 6.4 to 2.2     4/4/22-4/13/22: Completed SBRT to left adrenal gland.     06/17/2022: CT Chest/Abdomen/Pelvis demonstrates stable findings, stable size of left adrenal gland, stable GGO throughout bilateral lung fields      09/09/2022: CT CAP shows stable disease including stability of L adrenal gland; concern in liver noted on read reviewed and more consistent with fatty infiltration also seen on previous scans      10/07/2022: MRI brain demonstrates no intracranial metastasis.  Findings compatible with mastoiditis but subsequent CT scan ruled out mastoiditis, with a finding of osteopenia.     12/02/2022: CT CAP shows stable disease within the lung, L adrenal gland. Also noted stable pancreatic head & uncinate process lesions thought to represent IPMNs      02/20/2023: CT chest/abdomen/pelvis personally reviewed by me, demonstrating stable changes in the lungs as well as adrenal glands.  No new metastatic sites demonstrated.      03/01/2023: Discussion with patient regards to risks and benefits of continued single agent pembrolizumab in view of her issues with declining GFR associated with pemetrexed infusions.  Decision made to continue on single agent pembrolizumab every 3 weeks  for now     05/18/2023: CT CAP show stable disease without new metastatic sites      08/02/2023: Last  pembrolizumab dose    2023: CT chest/abdomen/pelvis demonstrating stability of findings with no new metastatic sites   Left adrenal measuring 3 cm in greatest diameter     PAST MEDICAL HISTORY  Asthma  HTN  Hypothyroidism   HLD  Glaucoma   Migraines     SURGICAL HISTORY  Anal fissures in her 30's  [age]il[age]omy 4/4 yo     SOCIAL HISTORY  Smoked 0.5 ppd from ages [age]  Drinks red wine daily   Plays tennis 3 x/week - doubles  Retired  - worked at [employer] and   Speaks 4 languages (S[place], Scottish, Italian and English)        FAMILY HISTORY  Maternal aunt  of metastatic cancer to the liver   Has one sister at age 84 who has Parkinson's  One sister  at age 73 of cirrhosis     CURRENT MEDS REVIEWED    ALLERGIES REVIEWED      SUBJECTIVE:  Patient here today with her . Overall feeling better. Mouth sores are getting better, eating more. No new issues    A 13 point review of systems was performed, with significant findings documented above in subjective history.    OBJECTIVE:  Vitals:    25 0926   BP: 135/53   Pulse: 94   Resp: 20   Temp: 37 °C (98.6 °F)   SpO2: 100%      Body surface area is 1.57 meters squared.     Wt Readings from Last 5 Encounters:   25 56 kg (123 lb 7.3 oz)   25 55.3 kg (122 lb)   25 55.7 kg (122 lb 12.7 oz)   24 54.4 kg (119 lb 14.9 oz)   24 54.6 kg (120 lb 5.9 oz)     ECOGSCORE: 0- Fully active, able to carry on all pre-disease performance w/o restriction.    Physical Exam  Constitutional:       Appearance: Normal appearance. She is normal weight.   HENT:      Head: Normocephalic and atraumatic.   Eyes:      General: No scleral icterus.     Extraocular Movements: Extraocular movements intact.      Conjunctiva/sclera: Conjunctivae normal.   Cardiovascular:      Rate and Rhythm: Normal rate and regular rhythm.      Heart sounds: Normal heart sounds.   Pulmonary:      Effort: Pulmonary effort is normal.      Breath sounds: Normal breath  sounds.   Abdominal:      General: Abdomen is flat.      Palpations: Abdomen is soft. There is no mass.      Tenderness: There is no abdominal tenderness. There is no guarding.   Musculoskeletal:      Cervical back: Neck supple.   Skin:     General: Skin is warm and dry.   Neurological:      General: No focal deficit present.      Mental Status: She is alert and oriented to person, place, and time. Mental status is at baseline.      Motor: No weakness.      Gait: Gait normal.   Psychiatric:         Mood and Affect: Mood normal.         Behavior: Behavior normal.         Thought Content: Thought content normal.         Judgment: Judgment normal.          Diagnostic Results   Results:  Labs:  Lab Results   Component Value Date    WBC 87.6 (HH) 02/12/2025    HGB 10.8 (L) 02/12/2025    HCT 34.7 (L) 02/12/2025    MCV 95 02/12/2025     (L) 02/12/2025      Lab Results   Component Value Date    NEUTROABS 3.28 01/03/2025      Lab Results   Component Value Date    GLUCOSE 102 (H) 02/12/2025    CALCIUM 8.9 02/12/2025     02/12/2025    K 4.0 02/12/2025    CO2 28 02/12/2025     02/12/2025    BUN 19 02/12/2025    CREATININE 1.07 (H) 02/12/2025    MG 1.69 01/03/2025     Lab Results   Component Value Date    ALT 10 02/12/2025    AST 13 02/12/2025    ALKPHOS 56 02/12/2025    BILITOT 0.4 02/12/2025      Lab Results   Component Value Date    ACTH 36.0 05/22/2024    CORTISOL 15.0 05/22/2024    TSH 0.81 05/22/2024    FREET4 1.26 09/18/2023     Imaging:    I personally reviewed the below imaging and concur with the findings as reported unless otherwise stated    === 02/06/25 ===    CT CHEST ABDOMEN PELVIS W IV CONTRAST    - Impression -  Restaging of metastatic lung adenocarcinoma, as compared to CT  11/04/2024:  1. Overall stable tumor burden as evidenced by stable pulmonary  nodules without evidence of new disease.  2. Decreasing splenomegaly and stable atrophic appearance of the  pancreas.  3. Additional chronic  findings are unchanged as detailed above.    I personally reviewed the images/study and I agree with the findings  as stated by Luis Eduardo Bueno MD. This study was interpreted at  [facility], [place], [place].    MACRO:  None    Signed by: Anthony Simpson 2/6/2025 7:48 PM  Dictation workstation:   [id number]       Assessment/Plan   Stage CLIFTON NSCLC - metastasis to the left adrenal gland  Remains off immunotherapy since August 2023 with no signs or concerns for progression of her disease.  - Scans reviewed, no evidence of progressive disease  Knows to call and reach out if any new symptoms or concerns related to possible progression of her cancer develop, such as unintentional weight loss, fatigue, new onset respiratory symptoms, localized pain.   - Last MRI brain 10/2022, will discuss repeating though asymptomatic also reasonable to monitor clinically    She is being treated for CLL by Dr. Sravani Huang.     Anxiety   - meditating, doing Yoga, Des and saleem Chi at [facility]  - uses Xanax as needed TID, refilled today     Endometrial thickening - further thickening noted on recent CT  - Will refer for pelvic ultrasound  - Refer to GYN    RTC in 4m with repeat scan prior    Karma King MD  [facility]

## 2025-02-12 NOTE — PROGRESS NOTES
Patient ID: Herlinda Springer is a 79 y.o. female.    Diagnosis:   Problem List Items Addressed This Visit       CLL (chronic lymphocytic leukemia) (Multi)     Treatment:   Oncology History Overview Note   Lymphocytosis 11/2023 CLL vs marginal zone lymphoma    Flow showing a CD5 positive, CD23 positive lymphoproliferative disorder, strong expression of CD20 and Mz384k and surface light chain atypical for CLL and MZL vs LPL was favored, who was referred by thoracic oncologist Dr Alva for workup of lymphocytosis. Patient has had very mild leukocytosis 12K since 3/2023, however increase to 22.3 in November with preserved hgb and platelets..     Additional database:     SPEP faint IgM kappa and free lambd lyte chsins too low to quantitate, B2 2.9,  ( nl)  IgH heavy chain, not mutated  NGS results:   DISEASE ASSOCIATED GENOMIC FINDINGS:   AUSTIN p.* (NM_[id number] c.3574A>T)  TP53 p.N239D (NM_000546 c.715A>G)  TP53 p.Z947Gzc*21 (NM_[id number] c.305_306delinsA)     CT scans to followup on her lung cancer 2/26/24 shows no splenomegally and no lymphadenopathy     CLL (chronic lymphocytic leukemia) (Multi)   3/6/2024 Initial Diagnosis    CLL (chronic lymphocytic leukemia) (CMS/HCC)         Past Medical History:  Has been treated for high cholesterol     Past Medical History:   Diagnosis Date    Anxiety     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    Cataract     Chronic lymphocytic leukemia (Multi) 12/02/2024    Episodic paroxysmal hemicrania, 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    Paroxysmal hemicrania    Essential (primary) hypertension 02/15/2018    White coat syndrome with hypertension    Hyperlipidemia 03/30/2016    Hypothyroidism 03/30/2016    Laceration without foreign body of scalp, initial encounter 01/15/2014    Laceration of scalp    Lobar pneumonia, unspecified organism (CMS-HCC) 09/18/2019    Lobar pneumonia    Lung cancer (Multi) 09/10/2020    Melanocytic nevi, unspecified 02/15/2018     Benign mole    Migraine with aura, not intractable, without status migrainosus 02/15/2018    Classic migraine with aura    Other specified disorders of bone density and structure, unspecified site 02/15/2018    Osteopenia    Pain in left ankle and joints of left foot     Chronic pain of left ankle    Pain in left ankle and joints of left foot 2017    Acute left ankle pain    Pain in left knee 2017    Acute pain of left knee    Personal history of other diseases of the musculoskeletal system and connective tissue 2015    History of muscle pain    Personal history of other diseases of the respiratory system 02/15/2018    History of acute sinusitis    Personal history of other endocrine, nutritional and metabolic disease 2016    History of vitamin D deficiency    Personal history of other specified conditions 2019    History of chronic cough    Personal history of other specified conditions 02/15/2018    History of shortness of breath    Pituitary tumor 2016    Primary open-angle glaucoma, left eye, severe stage 10/26/2016    Primary open angle glaucoma of left eye, severe stage    Primary open-angle glaucoma, right eye, moderate stage 2022    Primary open angle glaucoma of right eye, moderate stage    Rupture of popliteal cyst 2015    Ruptured Bakers cyst    Unspecified asthma with (acute) exacerbation ([facility]) 2019    Acute asthma exacerbation    Unspecified blepharitis left eye, unspecified eyelid 10/12/2015    Blepharitis of left eye    Unspecified glaucoma 02/15/2018    Glaucoma     Surgical History:     Past Surgical History:   Procedure Laterality Date    CATARACT EXTRACTION      OTHER SURGICAL HISTORY  10/12/2015    Anal Fissurectomy    TONSILLECTOMY  2014    Tonsillectomy      Family History:  No cancers in family, one sister age 90, sister  in  after falling.   Family History   Problem Relation Name Age of Onset    Hypertension Mother       Migraines Mother      Heart disease Father      Glaucoma Father          suspect     Social History:  Retired , worked as  at  and then at [employer], has been in [place] for 40 years,  for 45 yrs to second . no children. Light Former smoker as a younger adult, born in [place] emigrated in 1968.   Social History     Tobacco Use    Smoking status: Former     Types: Cigarettes   Vaping Use    Vaping status: Never Used   Substance Use Topics    Alcohol use: Never    Drug use: Never      -------------------------------------------------------------------------------------------------------  Subjective       Ms Springer is a 80 yo female h/o stage IV NSCLC (RUL mass, LT adrenal mets, mediastinal and cervical LN) s/p carbo/pem/pem (carbo given in 2021, pembro finished 8/2023) and RT now with stable disease (on surveillance), lymphocytosis with flow showing a CD5 positive, CD23 positive lymphoproliferative disorder,  strong expression of CD20 and Om864n and surface light chain atypical for CLL and  MZL vs LPL was favored, who was referred by thoracic oncologist Dr Alva for workup of lymphocytosis. Patient has had very mild leukocytosis 12K since 3/2023, however increase to 22.3 in November prompted further evaluation. She has preserved hgb and platelets.     Additional database:     SPEP faint IgM kappa and free lambd lyte chsins too low to quantitate, B2 2.9,  ( nl)  IgH heavy chain, not mutated  NGS results:   DISEASE ASSOCIATED GENOMIC FINDINGS:   AUSTIN p.* (NM_000051 c.3574A>T)  TP53 p.N239D (NM_000546 c.715A>G)  TP53 p.E758Etc*21 ([id number] c.305_306delinsA)   CT scans to followup on her lung cancer 2/26/24 shows no splenomegally and no lymphadenopathy    However CBC from 10/4/24 shows a wbc of 99.6 up from 70.8, more concerning is hgb 10 down from 11  and platets of 122K  down from 176 compared to  8/13/24. No fevers, night sweats, weight loss. Normal appetite. No  abdominal pain, normal bowel movements. No bleeding or bruising. No other concerns. Had labs done a week prior to visit today - Started zanubrutinib 10/10/24    Herlinda presents to the clinic today (2/13/25) with her  for follow up evaluation of her CLL and count checks.  She continues taking Zanubruitinib 160 mg twice daily.  Receiving okay from  Specialty pharmacy. She reports she is doing well and is feeling better.  Energy level is good.  Appetite is good and weight is stable.  Continues to have intermittent mouth sores that start then heal within a few days.  States her mouth sores are now smaller and are having fewer.  Using salt and soda rinse at night.  Mouth sores are not painful and are not affecting oral intake.  Started getting generalized small purple/red spots.  No itching.  Has a dermatology appointment coming up on 2/17/25 with Dr. Gonzalez.  Using aveno cream. Taking metamucil and senakot to prevent constipation.  Is able to have bowel movement every morning.  Started taking flonase for allergy symptoms.  Was found to have thickening of her endometrium on CT scan.  Was seen by GYN and had hysteroscopy on 12/24/24 which showed endometrial polyp.  Continues to follow with Pulmonary for her lung cancer with CT scan on 2/12/25, which is showing stable tumor burden.  Dr. King plans to get another CT scan in 4 months.      Review of Systems   Constitutional:  Negative for appetite change, chills, diaphoresis, fatigue, fever and unexpected weight change.   HENT:   Positive for mouth sores.    Respiratory: Negative.     Cardiovascular: Negative.    Gastrointestinal:  Positive for constipation. Negative for blood in stool, diarrhea, nausea and vomiting.   Genitourinary: Negative.     Musculoskeletal: Negative.    Skin:  Positive for rash.        bruising   Neurological: Negative.    Hematological:  Bruises/bleeds easily.   All other systems reviewed and are  negative.  -------------------------------------------------------------------------------------------------------  Objective   BSA: 1.57 meters squared  /51   Pulse 92   Temp 36.1 °C (97 °F)   Resp 17   Wt 56.2 kg (123 lb 14.4 oz)   SpO2 100%   BMI 22.66 kg/m²     Physical Exam  Vitals reviewed.   Constitutional:       Appearance: Normal appearance.      Comments: Fit and well looks a decade younger than stated age   HENT:      Head: Normocephalic.      Mouth/Throat:      Mouth: Mucous membranes are moist.      Comments: No mouth sores noted  Eyes:      Extraocular Movements: Extraocular movements intact.      Conjunctiva/sclera: Conjunctivae normal.      Pupils: Pupils are equal, round, and reactive to light.   Cardiovascular:      Rate and Rhythm: Normal rate and regular rhythm.      Pulses: Normal pulses.      Heart sounds: Normal heart sounds.   Pulmonary:      Effort: Pulmonary effort is normal.      Breath sounds: Normal breath sounds.   Chest:   Breasts:     Right: Normal.      Left: Normal.   Abdominal:      General: Abdomen is flat. Bowel sounds are normal.      Palpations: Abdomen is soft.   Musculoskeletal:         General: Normal range of motion.   Lymphadenopathy:      Comments: No lymphadenopathy   Skin:     General: Skin is warm and dry.      Comments: Small eraser sized sized bruising to face, arms, and bilateral shins   Neurological:      General: No focal deficit present.      Mental Status: She is alert and oriented to person, place, and time.   Psychiatric:         Mood and Affect: Mood normal.         Behavior: Behavior normal.         Thought Content: Thought content normal.     Performance Status:  Asymptomatic  -------------------------------------------------------------------------------------------------------  Assessment/Plan      Ms Springer is a 78 yo female h/o stage [place] NSCLC (RUL mass, LT adrenal mets, mediastinal and cervical LN) s/p carbo/pem/pem (carbo given in 2021,  pembro finished 8/2023) and RT (lung mass and adrenal mass) now with stable disease (on surveillance), new onset CD5, CD23 lymphocytosis with flow c/f MZL vs LPL, HTN, hypothyroidism, asthma, who was referred by thoracic oncologist Dr Alva for workup of lymphocytosis.     1 Atypical CLL  Lymphocytosis first noticed in 3/2023 at 12k, but increased to 22.3 in November 2023.  She has preserved hgb and platelets  and is assymptomatic. ALC doubled in 2023. New borderline splenomegaly on CT in 10/2023.  PB flow showed CD5+ clonal ppl, atypical for CLL, mainly concern for MZL vs LPL.  Additional database notable for TP 53 mutations, absence of MYD88 goes against LPL, so I suspect this is likely an atypical CLL.     According to IPSS score for asymptomatic CLL, she has high risk features in the form of IGHV unmutated and TP53x2 mutation, lymphocytosis>15,000    10/7/24  Patients wbc up to 96K, more concerning however is drop in hgb and platelets.  I have recommended starting zanubrutinib which was started on 10/10/24.  Initially patient developed some  symptoms with vaginitis and some mouth sores, all of which seem to be improving.      10/29/24 WBC count increasing as expected and minor sore on tongue, confirmed that patient is taking her acyclovir and recommended bicarb mouth rinses.  Echymotic rash on shins but no significant bleeding. Patient reassured and will continue zanubrutinib at current dosing.      Patient continues supportive meds including acyclovir and allopurinol reviewed. Hepatitis serologies sent and are negative  CT CAP (11/4/24) ordered by pulmonary showing stable splenomegaly, compatible with history of CLL, no new lymphadenopathy above or below the diaphragm.    2/13/25:  CBC results from 2/12/25 with slight decrease to WBC to 87.6 hgb 10.8, plt 147.  CT CAP (2/12/25) from lung cancer screening showing decrease to spleen from 15.3 cm to 14.4 cm.  Herlinda continues to have mild intermittent mouth  sores that are non-tender and do not affecting her oral intake.  Continues warm salt or baking soda rinses.  Has small eraser sized bruising to face, arms, and bilateral shins.  No signs of bleeding.  Reviewed CBC results and reassured that WBC count is trending down.   Continue zanubrutinib 160 mg BID.  Follow up visit in 2 months.      WBC   Date Value Ref Range Status   02/12/2025 87.6 (HH) 4.4 - 11.3 x10*3/uL Final   01/03/2025 129.0 (HH) 4.4 - 11.3 x10*3/uL Final     Comment:     Previous result verified on 1/3/2025 1052 on specimen/case [id number] called with component WBC Auto for procedure CBC and Auto Differential with value 129.0 x10*3/uL.   12/20/2024 118.9 (HH) 4.4 - 11.3 x10*3/uL Final   11/27/2024 159.0 (HH) 4.4 - 11.3 x10*3/uL Final   10/28/2024 169.8 (HH) 4.4 - 11.3 x10*3/uL Final     Comment:     Previous result verified on 10/28/2024 1144 on specimen/case [id number] called with component WBC Auto for procedure CBC and Auto Differential with value 169.8 x10*3/uL.     Hemoglobin   Date Value Ref Range Status   02/12/2025 10.8 (L) 12.0 - 16.0 g/dL Final   01/03/2025 10.9 (L) 12.0 - 16.0 g/dL Final   12/20/2024 10.3 (L) 12.0 - 16.0 g/dL Final   11/27/2024 10.1 (L) 12.0 - 16.0 g/dL Final   10/28/2024 10.3 (L) 12.0 - 16.0 g/dL Final     2. NSCLC adenocarcinoma   - in remission, managed by thoracic oncologists  CT CAP (2/12/25) IMPRESSION:  Restaging of metastatic lung adenocarcinoma, as compared to CT 11/04/2024:  1. Overall stable tumor burden as evidenced by stable pulmonary nodules without evidence of new disease.  2. Decreasing splenomegaly and stable atrophic appearance of the pancreas.  3. Additional chronic findings are unchanged as detailed above.  Following with Dr. King, last seen 2/12/25.  Dr. King's note states there is no concerning findings on imaging, with plans to have repeat CT scan in 4 months.  May plan to order MRI brain at next visit vs monitoring.      Endometrial  Thickening:  Endometrial thickening seen on CT scan from 11/4/24.  Pelvic US performed 12/10/24 with GYN evaluation.  Completed hysteroscopy on 12/24/24 with biopsy.  Biopsy showing normal squamous epithelium.  Last seen by GYN on 1/16/25.      3. Health maintenance:  Received 9/7/24 COVID, 9/16/24 RSV, and 9/21/24 influenza vaccine.  Mammogram (8/2/24): no mammographic evidence of malignancy.    RTC:    Follow up visit with provider in 2 months.  Advised to obtain blood work prior to visit.    -------------------------------------------------------------------------------------------------------  JOELLE Rod

## 2025-02-13 ENCOUNTER — OFFICE VISIT (OUTPATIENT)
Dept: HEMATOLOGY/ONCOLOGY | Facility: HOSPITAL | Age: 80
End: 2025-02-13
Payer: MEDICARE

## 2025-02-13 VITALS
BODY MASS INDEX: 22.66 KG/M2 | SYSTOLIC BLOOD PRESSURE: 126 MMHG | TEMPERATURE: 97 F | OXYGEN SATURATION: 100 % | DIASTOLIC BLOOD PRESSURE: 51 MMHG | HEART RATE: 92 BPM | RESPIRATION RATE: 17 BRPM | WEIGHT: 123.9 LBS

## 2025-02-13 DIAGNOSIS — C34.90 PRIMARY MALIGNANT NEOPLASM OF LUNG METASTATIC TO OTHER SITE, UNSPECIFIED LATERALITY (MULTI): ICD-10-CM

## 2025-02-13 DIAGNOSIS — R93.89 THICKENED ENDOMETRIUM: ICD-10-CM

## 2025-02-13 DIAGNOSIS — C91.10 CLL (CHRONIC LYMPHOCYTIC LEUKEMIA) (MULTI): Primary | ICD-10-CM

## 2025-02-13 DIAGNOSIS — K13.79 MOUTH SORES: ICD-10-CM

## 2025-02-13 PROCEDURE — 3078F DIAST BP <80 MM HG: CPT

## 2025-02-13 PROCEDURE — 1160F RVW MEDS BY RX/DR IN RCRD: CPT

## 2025-02-13 PROCEDURE — 99214 OFFICE O/P EST MOD 30 MIN: CPT

## 2025-02-13 PROCEDURE — 3074F SYST BP LT 130 MM HG: CPT

## 2025-02-13 PROCEDURE — 1126F AMNT PAIN NOTED NONE PRSNT: CPT

## 2025-02-13 PROCEDURE — 1159F MED LIST DOCD IN RCRD: CPT

## 2025-02-13 ASSESSMENT — ENCOUNTER SYMPTOMS
BRUISES/BLEEDS EASILY: 1
VOMITING: 0
DIARRHEA: 0
NAUSEA: 0
BLOOD IN STOOL: 0

## 2025-02-13 ASSESSMENT — PAIN SCALES - GENERAL: PAINLEVEL_OUTOF10: 0-NO PAIN

## 2025-02-17 ENCOUNTER — OFFICE VISIT (OUTPATIENT)
Dept: DERMATOLOGY | Facility: HOSPITAL | Age: 80
End: 2025-02-17
Payer: MEDICARE

## 2025-02-17 DIAGNOSIS — L81.4 LENTIGO: ICD-10-CM

## 2025-02-17 DIAGNOSIS — L82.1 SEBORRHEIC KERATOSIS: ICD-10-CM

## 2025-02-17 DIAGNOSIS — L81.7 PIGMENTED PURPURIC DERMATOSIS: ICD-10-CM

## 2025-02-17 DIAGNOSIS — K12.1 ORAL ULCERATION: ICD-10-CM

## 2025-02-17 DIAGNOSIS — D22.9 MULTIPLE BENIGN NEVI: Primary | ICD-10-CM

## 2025-02-17 DIAGNOSIS — Z12.83 SCREENING EXAM FOR SKIN CANCER: ICD-10-CM

## 2025-02-17 PROCEDURE — 1159F MED LIST DOCD IN RCRD: CPT | Performed by: DERMATOLOGY

## 2025-02-17 PROCEDURE — 1160F RVW MEDS BY RX/DR IN RCRD: CPT | Performed by: DERMATOLOGY

## 2025-02-17 PROCEDURE — 99213 OFFICE O/P EST LOW 20 MIN: CPT | Performed by: DERMATOLOGY

## 2025-02-17 ASSESSMENT — DERMATOLOGY PATIENT ASSESSMENT
ARE YOU AN ORGAN TRANSPLANT RECIPIENT: NO
DO YOU USE A TANNING BED: NO
DO YOU USE SUNSCREEN: DAILY
DO YOU HAVE ANY NEW OR CHANGING LESIONS: NO
HAVE YOU HAD OR DO YOU HAVE VASCULAR DISEASE: NO
ARE YOU ON BIRTH CONTROL: NO
HAVE YOU HAD OR DO YOU HAVE A STAPH INFECTION: NO
DO YOU HAVE IRREGULAR MENSTRUAL CYCLES: NO
ARE YOU TRYING TO GET PREGNANT: NO

## 2025-02-17 ASSESSMENT — ITCH NUMERIC RATING SCALE: HOW SEVERE IS YOUR ITCHING?: 0

## 2025-02-17 ASSESSMENT — DERMATOLOGY QUALITY OF LIFE (QOL) ASSESSMENT
RATE HOW EMOTIONALLY BOTHERED YOU ARE BY YOUR SKIN PROBLEM (FOR EXAMPLE, WORRY, EMBARRASSMENT, FRUSTRATION): 0 - NEVER BOTHERED
RATE HOW BOTHERED YOU ARE BY EFFECTS OF YOUR SKIN PROBLEMS ON YOUR ACTIVITIES (EG, GOING OUT, ACCOMPLISHING WHAT YOU WANT, WORK ACTIVITIES OR YOUR RELATIONSHIPS WITH OTHERS): 0 - NEVER BOTHERED
RATE HOW BOTHERED YOU ARE BY SYMPTOMS OF YOUR SKIN PROBLEM (EG, ITCHING, STINGING BURNING, HURTING OR SKIN IRRITATION): 0 - NEVER BOTHERED
DATE THE QUALITY-OF-LIFE ASSESSMENT WAS COMPLETED: 67253
ARE THERE EXCLUSIONS OR EXCEPTIONS FOR THE QUALITY OF LIFE ASSESSMENT: NO

## 2025-02-17 ASSESSMENT — PATIENT GLOBAL ASSESSMENT (PGA): PATIENT GLOBAL ASSESSMENT: PATIENT GLOBAL ASSESSMENT:  1 - CLEAR

## 2025-02-17 NOTE — PROGRESS NOTES
Subjective     Herlinda Springer is a 79 y.o. female who presents for the following: Skin Check. Last derm visit 10/28/24 for Full Skin Exam. History of CLL, history of lung cancer.     History of lung cancer treated with immunotherapy with Keytruda, last treatment 8/2/23, stopped due to elevated white count. Plan for monitoring      History of atypical CLL first noticed 3/2023, concern for MZL vs LPL. Started on zanubrutinib 10/10/24 and tolerating well      Review of Systems:  No other skin or systemic complaints other than what is documented elsewhere in the note.    The following portions of the chart were reviewed this encounter and updated as appropriate:  Tobacco  Allergies  Meds  Problems  Med Hx  Surg Hx         Skin Cancer History  No skin cancer on file.      Specialty Problems          Dermatology Problems    Rosacea, unspecified    Xerosis cutis    Numerous moles    Rash, drug        Objective   Well appearing patient in no apparent distress; mood and affect are within normal limits.    A full examination was performed including scalp, head, eyes, ears, nose, lips, neck, chest, axillae, abdomen, back, buttocks, bilateral upper extremities, bilateral lower extremities, hands, feet, fingers, toes, fingernails, and toenails. All findings within normal limits unless otherwise noted below.    Assessment/Plan   1. Multiple benign nevi  Brown and tan macules and papules with reassuring findings on dermoscopy    Medial calf - 2x4 mm dark brown macule with irregular darker pigment within; stable in size x1 year. Photos taken    -These lesions have benign, reassuring patterns on dermoscopy  -Recommend continued self observation, and to contact the office if any changes in nevi are noticed    2. Screening exam for skin cancer  History of lung cancer treated with immunotherapy with Keytruda, last treatment 8/2/23, stopped due to elevated white count. Plan for monitoring      History of atypical CLL first noticed  3/2023, concern for MZL vs LPL. Started on zanubrutinib 10/10/24 and tolerating well    Full body skin exam  -No lesions concerning for malignancy on the remainder the skin exam today   - The ugly duckling sign was discussed. Monitor for any skin lesions that are different in color, shape, or size than others on body  -Sun protection was discussed. Recommend SPF 30+, hats with brims, sun protective clothing, and avoiding sun exposure between 10 AM and 2 PM whenever possible  -Recommend regular skin exams or sooner if new or changing lesions       Related Procedures  Follow Up In Dermatology - Established Patient  Follow Up In Dermatology - Established Patient    3. Lentigo  Tan macules    -Benign appearing on exam  -Reassurance, recommend observation    4. Seborrheic keratosis  Stuck on, waxy macule(s)/papule(s)/plaque(s) with comedo-like openings and milia like cysts    -Discussed the nature of the diagnosis  -Reassurance, recommend continued observation    5. Pigmented purpuric dermatosis  Left Lower Leg - Anterior, Right Lower Leg - Anterior  Addison brown is now fading with scattered pink macules and purple macules.   - improving  - also considered LCV but improving    - first appeared with zanubritinib, also has background of mild varicosities and jumps on trampoline in morning for exercise  - compression stockings uncomfortable     - stable, no need for further treatment or evaluation unless worsening    6. Oral ulceration (2)  Left Labial Mucosa of the Upper Lip, Left Posterior Oropharynx  Resolved     - a large hematoma developed and then resolved. Has not recurred  - favored to be easy bruising from trauma, related to zanubrutinib        Follow up 6 months Full Skin Exam

## 2025-02-24 ENCOUNTER — SPECIALTY PHARMACY (OUTPATIENT)
Dept: PHARMACY | Facility: CLINIC | Age: 80
End: 2025-02-24

## 2025-02-24 PROCEDURE — RXMED WILLOW AMBULATORY MEDICATION CHARGE

## 2025-02-25 ENCOUNTER — PHARMACY VISIT (OUTPATIENT)
Dept: PHARMACY | Facility: CLINIC | Age: 80
End: 2025-02-25
Payer: COMMERCIAL

## 2025-03-14 ENCOUNTER — TELEPHONE (OUTPATIENT)
Dept: ADMISSION | Facility: HOSPITAL | Age: 80
End: 2025-03-14
Payer: MEDICARE

## 2025-03-14 DIAGNOSIS — F41.9 ANXIETY: ICD-10-CM

## 2025-03-14 RX ORDER — ALPRAZOLAM 0.5 MG/1
0.5 TABLET ORAL 3 TIMES DAILY PRN
Qty: 90 TABLET | Refills: 0 | Status: SHIPPED | OUTPATIENT
Start: 2025-03-14 | End: 2025-04-13

## 2025-03-14 NOTE — TELEPHONE ENCOUNTER
Herlinda Springer called the refill line for Alprazolam 0.5mg. Would like refills to be sent to [facility] pharmacy on file. Message sent to team to send in.

## 2025-03-15 DIAGNOSIS — J31.0 CHRONIC RHINITIS: ICD-10-CM

## 2025-03-17 RX ORDER — MONTELUKAST SODIUM 10 MG/1
10 TABLET ORAL EVERY EVENING
Qty: 90 TABLET | Refills: 1 | Status: SHIPPED | OUTPATIENT
Start: 2025-03-17

## 2025-03-21 ENCOUNTER — TELEPHONE (OUTPATIENT)
Dept: ADMISSION | Facility: HOSPITAL | Age: 80
End: 2025-03-21
Payer: MEDICARE

## 2025-03-21 DIAGNOSIS — M47.896 OTHER OSTEOARTHRITIS OF SPINE, LUMBAR REGION: Primary | ICD-10-CM

## 2025-03-21 PROCEDURE — RXMED WILLOW AMBULATORY MEDICATION CHARGE

## 2025-03-21 NOTE — TELEPHONE ENCOUNTER
Patient is requesting a Physical Therapy script for her lower back. She states she has had many scans through [facility] that have shown weakness in the bones in her back (specifically in the area of L5) and would like to start PT. Please place order if appropriate.

## 2025-03-24 NOTE — TELEPHONE ENCOUNTER
RN called and spoke to Herlinda in regards to her PT referral request. Herlinda says that she is experiencing lower back pain that comes and goes. She said when she called in on Friday, the pain was severe, but today the pain is not as bad. RN will notify provider.

## 2025-03-24 NOTE — ADDENDUM NOTE
Addended by: MARGOT BARRIOS on: 3/24/2025 11:29 AM     Modules accepted: Orders     DISPLAY PLAN FREE TEXT

## 2025-03-25 ENCOUNTER — SPECIALTY PHARMACY (OUTPATIENT)
Dept: PHARMACY | Facility: CLINIC | Age: 80
End: 2025-03-25

## 2025-03-26 ENCOUNTER — PHARMACY VISIT (OUTPATIENT)
Dept: PHARMACY | Facility: CLINIC | Age: 80
End: 2025-03-26
Payer: COMMERCIAL

## 2025-04-15 ENCOUNTER — LAB (OUTPATIENT)
Dept: LAB | Facility: HOSPITAL | Age: 80
End: 2025-04-15
Payer: MEDICARE

## 2025-04-15 ENCOUNTER — DOCUMENTATION (OUTPATIENT)
Dept: HEMATOLOGY/ONCOLOGY | Facility: HOSPITAL | Age: 80
End: 2025-04-15
Payer: MEDICARE

## 2025-04-15 DIAGNOSIS — C91.10 CLL (CHRONIC LYMPHOCYTIC LEUKEMIA) (MULTI): ICD-10-CM

## 2025-04-15 LAB
ALBUMIN SERPL BCP-MCNC: 4.4 G/DL (ref 3.4–5)
ALP SERPL-CCNC: 54 U/L (ref 33–136)
ALT SERPL W P-5'-P-CCNC: 10 U/L (ref 7–45)
ANION GAP SERPL CALC-SCNC: 12 MMOL/L (ref 10–20)
AST SERPL W P-5'-P-CCNC: 15 U/L (ref 9–39)
BASOPHILS # BLD AUTO: 0.07 X10*3/UL (ref 0–0.1)
BASOPHILS NFR BLD AUTO: 0.1 %
BILIRUB SERPL-MCNC: 0.5 MG/DL (ref 0–1.2)
BUN SERPL-MCNC: 17 MG/DL (ref 6–23)
BURR CELLS BLD QL SMEAR: NORMAL
CALCIUM SERPL-MCNC: 9.1 MG/DL (ref 8.6–10.3)
CHLORIDE SERPL-SCNC: 106 MMOL/L (ref 98–107)
CO2 SERPL-SCNC: 28 MMOL/L (ref 21–32)
CREAT SERPL-MCNC: 1.13 MG/DL (ref 0.5–1.05)
EGFRCR SERPLBLD CKD-EPI 2021: 49 ML/MIN/1.73M*2
EOSINOPHIL # BLD AUTO: 0.62 X10*3/UL (ref 0–0.4)
EOSINOPHIL NFR BLD AUTO: 0.7 %
ERYTHROCYTE [DISTWIDTH] IN BLOOD BY AUTOMATED COUNT: 13.8 % (ref 11.5–14.5)
GLUCOSE SERPL-MCNC: 90 MG/DL (ref 74–99)
HCT VFR BLD AUTO: 35.7 % (ref 36–46)
HGB BLD-MCNC: 11.1 G/DL (ref 12–16)
IMM GRANULOCYTES # BLD AUTO: 0.13 X10*3/UL (ref 0–0.5)
IMM GRANULOCYTES NFR BLD AUTO: 0.2 % (ref 0–0.9)
LDH SERPL L TO P-CCNC: 120 U/L (ref 84–246)
LYMPHOCYTES # BLD AUTO: 79.17 X10*3/UL (ref 0.8–3)
LYMPHOCYTES NFR BLD AUTO: 93.3 %
MCH RBC QN AUTO: 29.5 PG (ref 26–34)
MCHC RBC AUTO-ENTMCNC: 31.1 G/DL (ref 32–36)
MCV RBC AUTO: 95 FL (ref 80–100)
MONOCYTES # BLD AUTO: 1.18 X10*3/UL (ref 0.05–0.8)
MONOCYTES NFR BLD AUTO: 1.4 %
NEUTROPHILS # BLD AUTO: 3.66 X10*3/UL (ref 1.6–5.5)
NEUTROPHILS NFR BLD AUTO: 4.3 %
NRBC BLD-RTO: 0 /100 WBCS (ref 0–0)
OVALOCYTES BLD QL SMEAR: NORMAL
PLATELET # BLD AUTO: 156 X10*3/UL (ref 150–450)
POTASSIUM SERPL-SCNC: 4.9 MMOL/L (ref 3.5–5.3)
PROT SERPL-MCNC: 6.7 G/DL (ref 6.4–8.2)
RBC # BLD AUTO: 3.76 X10*6/UL (ref 4–5.2)
RBC MORPH BLD: NORMAL
SODIUM SERPL-SCNC: 141 MMOL/L (ref 136–145)
WBC # BLD AUTO: 84.8 X10*3/UL (ref 4.4–11.3)

## 2025-04-15 PROCEDURE — 83615 LACTATE (LD) (LDH) ENZYME: CPT

## 2025-04-15 PROCEDURE — 84075 ASSAY ALKALINE PHOSPHATASE: CPT

## 2025-04-15 PROCEDURE — 36415 COLL VENOUS BLD VENIPUNCTURE: CPT

## 2025-04-15 PROCEDURE — 85025 COMPLETE CBC W/AUTO DIFF WBC: CPT

## 2025-04-16 ENCOUNTER — EVALUATION (OUTPATIENT)
Dept: PHYSICAL THERAPY | Facility: HOSPITAL | Age: 80
End: 2025-04-16
Payer: MEDICARE

## 2025-04-16 DIAGNOSIS — M54.50 CHRONIC BILATERAL LOW BACK PAIN WITHOUT SCIATICA: Primary | ICD-10-CM

## 2025-04-16 DIAGNOSIS — M47.896 OTHER OSTEOARTHRITIS OF SPINE, LUMBAR REGION: ICD-10-CM

## 2025-04-16 DIAGNOSIS — G89.29 CHRONIC BILATERAL LOW BACK PAIN WITHOUT SCIATICA: Primary | ICD-10-CM

## 2025-04-16 PROCEDURE — 97161 PT EVAL LOW COMPLEX 20 MIN: CPT | Mod: GP

## 2025-04-16 PROCEDURE — 97110 THERAPEUTIC EXERCISES: CPT | Mod: GP

## 2025-04-16 ASSESSMENT — ENCOUNTER SYMPTOMS
FATIGUE: 0
ROS SKIN COMMENTS: BRUISING
BLOOD IN STOOL: 0
RESPIRATORY NEGATIVE: 1
OCCASIONAL FEELINGS OF UNSTEADINESS: 0
NEUROLOGICAL NEGATIVE: 1
CONSTIPATION: 1
CHILLS: 0
MUSCULOSKELETAL NEGATIVE: 1
UNEXPECTED WEIGHT CHANGE: 0
NAUSEA: 0
DIAPHORESIS: 0
BRUISES/BLEEDS EASILY: 1
FEVER: 0
DIARRHEA: 0
CARDIOVASCULAR NEGATIVE: 1
DEPRESSION: 0
VOMITING: 0
LOSS OF SENSATION IN FEET: 0
APPETITE CHANGE: 0

## 2025-04-16 NOTE — PROGRESS NOTES
Patient ID: Herlinda Springer is a 79 y.o. female.    Diagnosis:   Problem List Items Addressed This Visit    None      Treatment:   Oncology History Overview Note   Lymphocytosis 11/2023 CLL vs marginal zone lymphoma    Flow showing a CD5 positive, CD23 positive lymphoproliferative disorder, strong expression of CD20 and Vy700u and surface light chain atypical for CLL and MZL vs LPL was favored, who was referred by thoracic oncologist Dr Alva for workup of lymphocytosis. Patient has had very mild leukocytosis 12K since 3/2023, however increase to 22.3 in November with preserved hgb and platelets..     Additional database:     SPEP faint IgM kappa and free lambd lyte chsins too low to quantitate, B2 2.9,  ( nl)  IgH heavy chain, not mutated  NGS results:   DISEASE ASSOCIATED GENOMIC FINDINGS:   AUSTIN p.* (NM_[id number] c.[id number]>T)  TP53 p.N239D (NM_000546 c.715A>G)  TP53 p.W946Fhi*21 (NM_000546 c.305_306delinsA)     CT scans to followup on her lung cancer 2/26/24 shows no splenomegally and no lymphadenopathy     CLL (chronic lymphocytic leukemia) (Multi)   3/6/2024 Initial Diagnosis    CLL (chronic lymphocytic leukemia) (CMS/HCC)         Past Medical History:  Has been treated for high cholesterol     Past Medical History:   Diagnosis Date    Anxiety     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    Cataract     Chronic lymphocytic leukemia (Multi) 12/02/2024    Episodic paroxysmal hemicrania, 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    Paroxysmal hemicrania    Essential (primary) hypertension 02/15/2018    White coat syndrome with hypertension    Hyperlipidemia 03/30/2016    Hypothyroidism 03/30/2016    Laceration without foreign body of scalp, initial encounter 01/15/2014    Laceration of scalp    Lobar pneumonia, unspecified organism 09/18/2019    Lobar pneumonia    Lung cancer (Multi) 09/10/2020    Melanocytic nevi, unspecified 02/15/2018    Benign mole    Migraine with aura, not  intractable, without status migrainosus 02/15/2018    Classic migraine with aura    Other specified disorders of bone density and structure, unspecified site 02/15/2018    Osteopenia    Pain in left ankle and joints of left foot     Chronic pain of left ankle    Pain in left ankle and joints of left foot 2017    Acute left ankle pain    Pain in left knee 2017    Acute pain of left knee    Personal history of other diseases of the musculoskeletal system and connective tissue 2015    History of muscle pain    Personal history of other diseases of the respiratory system 02/15/2018    History of acute sinusitis    Personal history of other endocrine, nutritional and metabolic disease 2016    History of vitamin D deficiency    Personal history of other specified conditions 2019    History of chronic cough    Personal history of other specified conditions 02/15/2018    History of shortness of breath    Pituitary tumor 2016    Primary open-angle glaucoma, left eye, severe stage 10/26/2016    Primary open angle glaucoma of left eye, severe stage    Primary open-angle glaucoma, right eye, moderate stage 2022    Primary open angle glaucoma of right eye, moderate stage    Rupture of popliteal cyst 2015    Ruptured Bakers cyst    Unspecified asthma with (acute) exacerbation ([facility]) 2019    Acute asthma exacerbation    Unspecified blepharitis left eye, unspecified eyelid 10/12/2015    Blepharitis of left eye    Unspecified glaucoma 02/15/2018    Glaucoma     Surgical History:     Past Surgical History:   Procedure Laterality Date    CATARACT EXTRACTION      OTHER SURGICAL HISTORY  10/12/2015    Anal Fissurectomy    TONSILLECTOMY  2014    Tonsillectomy      Family History:  No cancers in family, one sister age 90, sister  in  after falling.   Family History   Problem Relation Name Age of Onset    Hypertension Mother      Migraines Mother      Heart disease  Father      Glaucoma Father          suspect     Social History:  Retired , worked as  at  and then at [employer], has been in [place] for 40 years,  for 45 yrs to second . no children. Light Former smoker as a younger adult, born in [place] emigrated in 1968.   Social History     Tobacco Use    Smoking status: Former     Types: Cigarettes   Vaping Use    Vaping status: Never Used   Substance Use Topics    Alcohol use: Never    Drug use: Never      -------------------------------------------------------------------------------------------------------  Subjective       Ms Springer is a 78 yo female h/o stage IV NSCLC (RUL mass, LT adrenal mets, mediastinal and cervical LN) s/p carbo/pem/pem (carbo given in 2021, pembro finished 8/2023) and RT now with stable disease (on surveillance), lymphocytosis with flow showing a CD5 positive, CD23 positive lymphoproliferative disorder,  strong expression of CD20 and Dx113a and surface light chain atypical for CLL and  MZL vs LPL was favored, who was referred by thoracic oncologist Dr Alva for workup of lymphocytosis. Patient has had very mild leukocytosis 12K since 3/2023, however increase to 22.3 in November prompted further evaluation. She has preserved hgb and platelets.     Additional database:     SPEP faint IgM kappa and free lambd lyte chsins too low to quantitate, B2 2.9,  ( nl)  IgH heavy chain, not mutated  NGS results:   DISEASE ASSOCIATED GENOMIC FINDINGS:   AUSTIN p.* (NM_000051 c.3574A>T)  TP53 p.N239D (NM_000546 c.715A>G)  TP53 p.I853Qtr*21 (NM_000546 c.305_306delinsA)   CT scans to followup on her lung cancer 2/26/24 shows no splenomegally and no lymphadenopathy    However CBC from 10/4/24 shows a wbc of 99.6 up from 70.8, more concerning is hgb 10 down from 11  and platets of 122K  down from 176 compared to  8/13/24. No fevers, night sweats, weight loss. Normal appetite. No abdominal pain, normal bowel movements.  No bleeding or bruising. No other concerns. Had labs done a week prior to visit today - Started zanubrutinib 10/10/24    Herlinda presents to the clinic today (4/17/25) with her  for follow up evaluation of her CLL and count checks.  She continues taking Zanubruitinib 160 mg twice daily.  Receiving okay from  [facility]ty pharmacy. She reports she is doing well and is feeling better.  Energy level is good.  Appetite is good and weight is stable.  Mouth sores have gone away.  Has small echymotic patches on her legs.  She is disappointed that her wbc count hasn't dropped much over the past two months but hgb and platlets have improved. Continues to follow with Pulmonary for her lung cancer with CT scan on 2/12/25, which is showing stable tumor burden.  Dr. King plans to get another CT scan in 4 months.      Review of Systems   Constitutional:  Negative for appetite change, chills, diaphoresis, fatigue, fever and unexpected weight change.   HENT:   Positive for mouth sores.    Respiratory: Negative.     Cardiovascular: Negative.    Gastrointestinal:  Positive for constipation. Negative for blood in stool, diarrhea, nausea and vomiting.   Genitourinary: Negative.     Musculoskeletal: Negative.    Skin:  Positive for rash.        bruising   Neurological: Negative.    Hematological:  Bruises/bleeds easily.   All other systems reviewed and are negative.  -------------------------------------------------------------------------------------------------------  Objective   BSA: There is no height or weight on file to calculate BSA.  There were no vitals taken for this visit.    Physical Exam  Vitals reviewed.   Constitutional:       Appearance: Normal appearance.      Comments: Fit and well looks a decade younger than stated age   HENT:      Head: Normocephalic and atraumatic.      Nose: Nose normal.      Mouth/Throat:      Mouth: Mucous membranes are moist.      Comments: No mouth sores noted  Eyes:      Extraocular  Movements: Extraocular movements intact.      Conjunctiva/sclera: Conjunctivae normal.      Pupils: Pupils are equal, round, and reactive to light.   Cardiovascular:      Rate and Rhythm: Normal rate and regular rhythm.      Pulses: Normal pulses.      Heart sounds: Normal heart sounds.   Pulmonary:      Effort: Pulmonary effort is normal.      Breath sounds: Normal breath sounds.   Chest:   Breasts:     Right: Normal.      Left: Normal.   Abdominal:      General: Abdomen is flat. Bowel sounds are normal.      Palpations: Abdomen is soft.   Musculoskeletal:         General: Normal range of motion.   Lymphadenopathy:      Comments: No lymphadenopathy   Skin:     General: Skin is warm and dry.      Comments: Small eraser sized sized bruising to face, arms, and bilateral shins   Neurological:      General: No focal deficit present.      Mental Status: She is alert and oriented to person, place, and time.   Psychiatric:         Mood and Affect: Mood normal.         Behavior: Behavior normal.         Thought Content: Thought content normal.     Performance Status:  Asymptomatic  -------------------------------------------------------------------------------------------------------  Assessment/Plan      Ms Springer is a 80 yo female h/o stage [place] NSCLC (RUL mass, LT adrenal mets, mediastinal and cervical LN) s/p carbo/pem/pem (carbo given in 2021, pembro finished 8/2023) and RT (lung mass and adrenal mass) now with stable disease (on surveillance), new onset CD5, CD23 lymphocytosis with flow c/f MZL vs LPL, HTN, hypothyroidism, asthma, who was referred by thoracic oncologist Dr Alva for workup of lymphocytosis.     1 Atypical CLL  Lymphocytosis first noticed in 3/2023 at 12k, but increased to 22.3 in November 2023.  She has preserved hgb and platelets  and is assymptomatic. ALC doubled in 2023. New borderline splenomegaly on CT in 10/2023.  PB flow showed CD5+ clonal ppl, atypical for CLL, mainly concern for MZL vs LPL.   Additional database notable for TP 53 mutations, absence of MYD88 goes against LPL, so I suspect this is likely an atypical CLL.     According to IPSS score for asymptomatic CLL, she has high risk features in the form of IGHV unmutated and TP53x2 mutation, lymphocytosis>15,000    10/7/24  Patients wbc up to 96K, more concerning however is drop in hgb and platelets. Zanubrutinib recommended and started 10/10/24.  Initially patient developed some  symptoms with vaginitis and some mouth sores, all of which seem to be improving.      CT CAP (2/12/25) from lung cancer screening showing decrease to spleen from 15.3 cm to 14.4 cm.      4/17/25  Labs today show unchanged wbc ( 84) and lymphocyte as below, somewhat reassuring are improved hgb and platelets.  Will continue zanubrutinib at 160  mg po bid.   Follow up visit in 2 months.  Potential next steps are venetoclax with anti CD20 antibody if significant increases in wbc or decline in plats or hgb. Patient reassured that no changes needed at this time. Continue acyclovir  prophylaxis.     WBC   Date Value Ref Range Status   04/15/2025 84.8 (HH) 4.4 - 11.3 x10*3/uL Final   02/12/2025 87.6 (HH) 4.4 - 11.3 x10*3/uL Final   01/03/2025 129.0 (HH) 4.4 - 11.3 x10*3/uL Final     Comment:     Previous result verified on 1/3/2025 1052 on specimen/case [id number] called with component WBC Auto for procedure CBC and Auto Differential with value 129.0 x10*3/uL.   12/20/2024 118.9 (HH) 4.4 - 11.3 x10*3/uL Final   11/27/2024 159.0 (HH) 4.4 - 11.3 x10*3/uL Final     Hemoglobin   Date Value Ref Range Status   04/15/2025 11.1 (L) 12.0 - 16.0 g/dL Final   02/12/2025 10.8 (L) 12.0 - 16.0 g/dL Final   01/03/2025 10.9 (L) 12.0 - 16.0 g/dL Final   12/20/2024 10.3 (L) 12.0 - 16.0 g/dL Final   11/27/2024 10.1 (L) 12.0 - 16.0 g/dL Final     2. NSCLC adenocarcinoma   - in remission, managed by thoracic oncologists  CT CAP (2/12/25) IMPRESSION:  Restaging of metastatic lung adenocarcinoma,  as compared to CT 11/04/2024:  1. Overall stable tumor burden as evidenced by stable pulmonary nodules without evidence of new disease.  2. Decreasing splenomegaly and stable atrophic appearance of the pancreas.  3. Additional chronic findings are unchanged as detailed above.  Following with Dr. King, last seen 2/12/25.  Dr. King's note states there is no concerning findings on imaging, with plans to have repeat CT scan in 4 months.  May plan to order MRI brain at next visit vs monitoring.      Endometrial Thickening:  Endometrial thickening seen on CT scan from 11/4/24.  Pelvic US performed 12/10/24 with GYN evaluation.  Completed hysteroscopy on 12/24/24 with biopsy.  Biopsy showing normal squamous epithelium.  Last seen by GYN on 1/16/25.      3. Health maintenance:  Received 9/7/24 COVID, 9/16/24 RSV, and 9/21/24 influenza vaccine.  Mammogram (8/2/24): no mammographic evidence of malignancy.    RTC:    Follow up visit with provider in 2 months.  Advised to obtain blood work prior to visit.    -------------------------------------------------------------------------------------------------------  Sravani Huang MD

## 2025-04-16 NOTE — PROGRESS NOTES
Physical Therapy Initial Evaluation    Patient Name:Herlinda Springer  MRN:[id number]  Today's Date:2025  Referred by: Karma King  Time Calculation  Start Time: 1040  Stop Time: 1125  Time Calculation (min): 45 min  Evaluation PT Evaluation Time Entry  PT Evaluation (Low) Time Entry: 35  Therapeutic Procedure PT Therapeutic Procedures Time Entry  Therapeutic Exercise Time Entry: 10        Therapy Diagnosis  Problem List Items Addressed This Visit           ICD-10-CM    Osteoarthritis of lumbar spine M47.816    Relevant Orders    Follow Up In Physical Therapy     Other Visit Diagnoses         Codes      Chronic bilateral low back pain without sciatica    -  Primary M54.50, G89.29    Relevant Orders    Follow Up In Physical Therapy          Insurance  Visit number: 1   Approved number of visits: MN  Auth required: No  Onset Date: 2025  Medicare Certification Period:  Beginnin2025            Ending: ?  Payor: MEDICARE / Plan: MEDICARE PART A AND B / Product Type: *No Product type* /     Precautions/Fall Risk:  Fall Risk: Low based on professional judgment and STEDI  Pacemaker: no    Seizures: No    Post Op Movement/Restrictions: No  Weight Bearing Status: As tolerated  PMH: hx of lung cancer and chronic lymphocytic leukemia     SUBJECTIVE  Chief complaint/reason for visit: Pt reports experiencing chronic back pain that she has experienced intermittently for years. Has received CT scans and it was found that she has arthritic changes most  prominent in L5-S1.  Pain brought on by lifting, sudden movements, and repeated motions such as flexion when doing yard work.  Mechanism of Injury:  a chronic condition and a progressive, insidious condition  Location of Pain: B low back pain, usually worse on the R side. Notes a knob felt over the spine that is painless with palpation  Current Pain Level (0-10): 5   High Pain Level (0-10): 8   Low Pain Level (0-10): 0  Pain Quality: spasm  Pain Exacerbating  Factors: overuse, sudden/jerky movements   Pain Relieving Factors: medications Tylenol and icy hot patches    Medical Screening: Reviewed medical history form with patient and medical screening assessed.   Red Flags: unexplained change in bowel or bladder functions - frequency, reports d/t kidney function   Current Medical Management: OTC medication  Prior Level of Function (PLOF)  Patient previously independent with all ADLs  Exercise/Physical Activity: Yes: yoga, chi gong  Functional limitations: participation in home management  Work Status: retired  Current Status: improved  Patient Awareness: Patient is aware of  her diagnosis and prognosis.   Living Environment: house, many steps  Social Support: spouse / significant other  Personal Factors That May Impact Care:  none    OBJECTIVE  Other Measures  Oswestry Disablity Index (TRISTA): 12%     Low back screen: clear     Lower Extremity ROM: WNL unless documented below:  Date: eval RIGHT LEFT   Hip Flexion 120 110   Hip abd 40 40   Hip ext 20 20   Knee Extension WNL WNL   Knee Flexion 122 115     Lower Extremity Strength:  Date: eval Myotome RIGHT LEFT   Hip Flexion L1,2 4+/5 4-/5   Hip ext  4/5 4/5   Hip abd  5/5 5/5   Hip add  5/5 5/5   Knee Extension L3 5/5 5/5   Knee Flexion  5/5 5/5   Ankle DF L4 5/5 5/5   Ankle PF S1 5/5 5/5     Lumbar ROM:  Date: eval Percentage    Flexion 100%     Extension 100%     RIGHT LEFT   Side Bend 100% - p! on L 100%   Rotation 100% 100%     Joint mobility: decreased mobility in thoracic and lumbar spine with CPA and UPA  Posture: upright posturing, rounded shoulders   Palpation: TTP over L5 spinous process which is more prominent than surrounding spinous processes  Neurological symptoms: None      ASSESSMENT  Patient is a 79 y.o. female who presents with complaint of chronic intermittent low back pain d/t degenerative changes. Standardized testing and measures administered today reveal that the patient has multiple impairments in body  structures and functions, activity limitations, and participation restrictions. These include subjective and objective findings such as pain and tenderness to palpation of the affected area. The patient's impairments are likely influenced by degenerative changes and pain . Skilled PT services are warranted in order to realize measurable and meaningful change in the above outcome measures and achieve improvements in the patient's functional status and individual goals.    Problem List: Pain and Joint mobility  Rehab Potential:good  Clinical Presentation: Stable and/or uncomplicated characteristics   Evaluation Complexity: low    PLAN of Care  Goals: Goals set and discussed today.   Patient's Goal for Treatment: Relieve pain and Return to prior level of function    Lumbar Spine Goals:  By discharge, patient will:  Demonstrate independence with home exercise program.  Tolerate increased exercise without adverse reaction.  Increase strength of hip extension to 5/5 in order to improve the ability to perform essential ADLs  Report decrease in pain by >= 2 points to meet MCID  Decrease score of Modified TRISTA by > 6 points to meet the MCID  Decrease time on 5x sit to stand test by > 2.3 sec to meet the MCID  Ascend and descend 12 stairs without an increase in symptoms  Be able to sleep through the night without the help of medication without an increase in symptoms.  Patient will report experiencing one week without experiencing familiar back pain   Patient will be able to carry 5lbs anteriorly and walk 200 ft without an increase in sx.      Planned interventions include prn:  Therapeutic exercise, Manual therapy, Gait training, Therapeutic activity, Neuromuscular Re Education, E stim unattended, Kinesiotape, Hot pack/Cold pack, and E stim attended  Frequency and duration: 1 time(s) a week, for  6-8 weeks.   Plan of care was developed with input and agreement by the patient.     Plan for next session: Review HEP, begin TA  training, stair training, progress strengthening of hips and core and balance training     Treatment/Education/Resources Provided Today: Initial evaluation, instruction regarding home exercise program and patient education regarding prognosis, importance of HEP, and role of PT  Response to Treatment: Improved knowledge and understanding of condition    [facility]:    Access Code: [email or web address]  URL: [email or web address]  Date: 04/17/2025  Prepared by: Mary Jane Pollack    Exercises  - Supine Bridge  - 1 x daily - 7 x weekly - 3 sets - 10 reps  - Supine March  - 1 x daily - 7 x weekly - 3 sets - 10 reps  - Supine Lower Trunk Rotation  - 1 x daily - 7 x weekly - 3 sets - 10 reps  - Bent Knee Fallouts  - 1 x daily - 7 x weekly - 3 sets - 10 reps

## 2025-04-17 ENCOUNTER — OFFICE VISIT (OUTPATIENT)
Dept: HEMATOLOGY/ONCOLOGY | Facility: HOSPITAL | Age: 80
End: 2025-04-17
Payer: MEDICARE

## 2025-04-17 VITALS
DIASTOLIC BLOOD PRESSURE: 61 MMHG | TEMPERATURE: 97.2 F | OXYGEN SATURATION: 100 % | HEART RATE: 97 BPM | WEIGHT: 122.8 LBS | BODY MASS INDEX: 22.46 KG/M2 | SYSTOLIC BLOOD PRESSURE: 141 MMHG | RESPIRATION RATE: 16 BRPM

## 2025-04-17 DIAGNOSIS — C91.10 CLL (CHRONIC LYMPHOCYTIC LEUKEMIA) (MULTI): ICD-10-CM

## 2025-04-17 PROCEDURE — 99214 OFFICE O/P EST MOD 30 MIN: CPT | Performed by: INTERNAL MEDICINE

## 2025-04-17 PROCEDURE — 3078F DIAST BP <80 MM HG: CPT | Performed by: INTERNAL MEDICINE

## 2025-04-17 PROCEDURE — 3077F SYST BP >= 140 MM HG: CPT | Performed by: INTERNAL MEDICINE

## 2025-04-17 PROCEDURE — 1159F MED LIST DOCD IN RCRD: CPT | Performed by: INTERNAL MEDICINE

## 2025-04-17 PROCEDURE — 1126F AMNT PAIN NOTED NONE PRSNT: CPT | Performed by: INTERNAL MEDICINE

## 2025-04-17 ASSESSMENT — PAIN SCALES - GENERAL: PAINLEVEL_OUTOF10: 0-NO PAIN

## 2025-04-21 ENCOUNTER — TELEPHONE (OUTPATIENT)
Dept: ADMISSION | Facility: HOSPITAL | Age: 80
End: 2025-04-21
Payer: MEDICARE

## 2025-04-21 ENCOUNTER — SPECIALTY PHARMACY (OUTPATIENT)
Dept: PHARMACY | Facility: CLINIC | Age: 80
End: 2025-04-21

## 2025-04-21 DIAGNOSIS — F41.9 ANXIETY: ICD-10-CM

## 2025-04-21 PROCEDURE — RXMED WILLOW AMBULATORY MEDICATION CHARGE

## 2025-04-21 RX ORDER — ALPRAZOLAM 0.5 MG/1
0.5 TABLET ORAL 3 TIMES DAILY PRN
Qty: 90 TABLET | Refills: 0 | Status: SHIPPED | OUTPATIENT
Start: 2025-04-21 | End: 2025-05-21

## 2025-04-21 NOTE — TELEPHONE ENCOUNTER
Herlinda Springer called the refill line for Alprazolam 0.5mg TID prn. Would like refills to be sent to [facility] pharmacy on file. Message sent to team to send in.

## 2025-04-24 ENCOUNTER — PHARMACY VISIT (OUTPATIENT)
Dept: PHARMACY | Facility: CLINIC | Age: 80
End: 2025-04-24
Payer: COMMERCIAL

## 2025-05-08 ENCOUNTER — TELEPHONE (OUTPATIENT)
Dept: PRIMARY CARE | Facility: CLINIC | Age: 80
End: 2025-05-08
Payer: MEDICARE

## 2025-05-09 DIAGNOSIS — E03.9 HYPOTHYROIDISM, UNSPECIFIED TYPE: ICD-10-CM

## 2025-05-09 RX ORDER — LEVOTHYROXINE SODIUM 75 UG/1
75 TABLET ORAL DAILY
Qty: 90 TABLET | Refills: 1 | Status: SHIPPED | OUTPATIENT
Start: 2025-05-09

## 2025-05-12 ENCOUNTER — TELEPHONE (OUTPATIENT)
Dept: RADIATION ONCOLOGY | Facility: HOSPITAL | Age: 80
End: 2025-05-12
Payer: MEDICARE

## 2025-05-12 NOTE — TELEPHONE ENCOUNTER
Called pt to remind of appointment on 5/13/2025 at 1:30 Pt answered and will be present.    Pt is aware that the appointment is a phone/virtual visit, pt. understands that he/she doesn't have to show up in office.

## 2025-05-13 ENCOUNTER — HOSPITAL ENCOUNTER (OUTPATIENT)
Dept: RADIATION ONCOLOGY | Facility: HOSPITAL | Age: 80
Setting detail: RADIATION/ONCOLOGY SERIES
Discharge: HOME | End: 2025-05-13
Payer: MEDICARE

## 2025-05-13 DIAGNOSIS — C34.91 PRIMARY MALIGNANT NEOPLASM OF RIGHT LUNG METASTATIC TO OTHER SITE (MULTI): Primary | ICD-10-CM

## 2025-05-13 DIAGNOSIS — E27.8 ADRENAL MASS (MULTI): ICD-10-CM

## 2025-05-13 PROCEDURE — 99213 OFFICE O/P EST LOW 20 MIN: CPT | Mod: 95 | Performed by: NURSE PRACTITIONER

## 2025-05-13 PROCEDURE — 99213 OFFICE O/P EST LOW 20 MIN: CPT | Performed by: NURSE PRACTITIONER

## 2025-05-13 PROCEDURE — G2211 COMPLEX E/M VISIT ADD ON: HCPCS | Performed by: NURSE PRACTITIONER

## 2025-05-13 ASSESSMENT — ENCOUNTER SYMPTOMS
RESPIRATORY NEGATIVE: 1
NEUROLOGICAL NEGATIVE: 1
DIAPHORESIS: 0
MUSCULOSKELETAL NEGATIVE: 1
ACTIVITY CHANGE: 0
UNEXPECTED WEIGHT CHANGE: 0
WHEEZING: 0
APPETITE CHANGE: 0
SHORTNESS OF BREATH: 0
HEMATOLOGIC/LYMPHATIC NEGATIVE: 1
CARDIOVASCULAR NEGATIVE: 1
FATIGUE: 0
CHILLS: 0
FEVER: 0
COUGH: 0
PSYCHIATRIC NEGATIVE: 1

## 2025-05-13 NOTE — PROGRESS NOTES
Patient ID: [id number]   Cancer Staging:          Lung         AJCC Edition: 8th (AJCC), Diagnosis Date: 17-Dec-2019, [place], cT2a cN3 cM1b      Treatment Synopsis:    Mrs. Springer is a 78 yo female, former smoker (quit 25 years prior to dx)who presented with  new onset respiratory sx (cough), decreased appetite and weight loss (lost 13 lbs in 2 months).   Chest CT 11/07/19 showed a RUL nodule measuring 4 cm, mediastinal adenopathy (level 2L measuring 1.8 cm, .   A PET scan obtained on 12/03/19 showed FDG uptake in the RUL nodule as well as 1R/1L nodes, bilateral mediastinal and right hilar node. Left adrenal gland was hypermetabolic, c/w metastasis.  On 12/17/19 CT-guided biopsy of the left adrenal gland confirmed the presence of a mucinous adenocarcinoma, TTF-1 positive, Napsin A and CK7 also positive. PD-L1 TPS < 1%, with no actionable mutations, TP53 mutant.   MRI brain 12/22/19 showed a 2 mm enhancement in left temporal lobe that is nonspecific but could be an additional metastatic site.   01/15/20: cycle # 1 carbo/pemetrexed/pembrolizumab   03/18/20: Cycle # 4 carbo/pemetrexed/pembrolizumab   04/08/20: starts maintenance pemetrexed/pembrolizumab   05/26/20: MRI brain shows no intracranial disease  05/26/20: CT C/A/P: increase in size of RUL mass - now measuring ~ 3.2 cm and stability of left adrenal gland - referred to Rad Onc for consideration of SBRT - continues pemetrexed/pembrolizumab maintenance   08/19/20:  completes RT to the RUL  08/20/20: grade 1 rash b/l UEs - treated with topical triamcinolone - continue pemetrexed + pembrolizumab  10/16/20: CT C/A/P: Increased consolidative changes  in the RUL c/w RT-induced fibrosis - no LAD. The adrenal glands look stable - the multiple lung GG opacifications are stable   11/27/20: CT C/A/P - improvement on RUL interstitial changes - there is no sign of PD. The RUL mass has decreased in size from 7 to 5 cm in greatest diameter - left adrenal gland is stable in size.    02/19/21: CT C/A/P personally reviewed by me: the RUL apical lesion that has been irradiated is further  decreased in size, now measuring 3.9 cm x 2 cm and previously on 11/24/21 measuring 4.8 cm x 2.4 cm (solid component). The left adrenal mass measures  3.4 cm and is stable in size. There are no new concerning metastatic lesions,. Multiple areas of GGO remain and are of no clinical significance in this patients with metastatic disease.   05/14/21: CT Chest/abdomen/pelvis personally reviewed by me. The RUL mass remains stable and so do the nodes. However, the GGO opacifications persist - the left adrenal gland is slightly smaller (3.6 cm << 3.8 cm). The right adrenal gland remains stable.      6/30/21: Will continue maintenance pembrolizumab and pemetrexed. Given CrCl, will dose Pemetrexed at 375 mg/m2 today.      08/06/21: CT C/A/P shows further development of interstitial changes in the RUL that look purely inflammatory in nature - there are no concerns for PD - no worsening LAD, the left adrenal mass remains stable at 3.6 cm. This adrenal gland has been biopsied  at her dx      12/09/21: CT C/A/P shows SD - the RUL changes from prior RT remain stable - no new emerging LAD. Adrenal glands stable      12/15/21: dopplers LE negative for DVT      03/04/2022: CT Chest/Abdomen/Pelvis shows no signs of PD with the exception of left adrenal gland that continues to measure 3.4 cm however has developed an area of necrosis that is concerning. Both RUL and LLL GGO remain stable measuring 2.7 cm and 2.8  cm respectively      03/25/2022: PET scan demonstrates close to metabolic complete remission, left adrenal gland SUV has decreased from 6.4 to 2.2     4/4/22-4/13/22: Completed SBRT to left adrenal gland.     Last pembrolizumab dose on 08/02/2023 due to elevated WBC    Diagnosed with Atypical CLL. Follows with Dr. Huang    10/10/24 started Zanubrutinib/ on prophylactic Acyclovir.    History of presenting  illness    Telehealth visit with Ms. Springer today. Patient is a 79 year old female who presents today for follow up 3 years and 1 months s/p RT to the left adrenal gland. Pembro was discontinued due to elevated WBC count in August 2023. There are no plans to resume. She was diagnosed with CLL and is on treatment with Dr. Huang. Patient states she feels good.  Energy is baseline. Appetite good. Weight stable.  Breathing is baseline. Denies new cough, chest pain, back pain, fevers, night sweats or worsening SOB. No neurological  complaints.  CT CAP in 11/2024  show an increased endometrial thickening. She had an ultrasound and then a hysteroscopy with polyp removal 12/24/24. Polyp was benign. CT CAP from February was stable.  Next set of imaging and follow up with Dr. King is planned for June.   Review of systems:  Review of Systems   Constitutional:  Negative for activity change, appetite change, chills, diaphoresis, fatigue, fever and unexpected weight change.   HENT: Negative.     Respiratory: Negative.  Negative for cough, shortness of breath and wheezing.    Cardiovascular: Negative.    Musculoskeletal: Negative.    Skin: Negative.    Neurological: Negative.    Hematological: Negative.    Psychiatric/Behavioral: Negative.         Past Medical history  She  has a past surgical history that includes Tonsillectomy (08/01/2014); Other surgical history (10/12/2015); and Cataract extraction.        Radiology results:  CT chest abdomen pelvis w IV contrast 02/06/2025    Narrative  Interpreted By:  Anthony Simpson and Booth Cameron  STUDY:  CT CHEST ABDOMEN PELVIS W IV CONTRAST; 2/6/2025 9:30 am    INDICATION:  Signs/Symptoms:lung cancer off treatment, surveillance; 79-year-old  female with history of metastatic lung adenocarcinoma with primary in  right upper lobe, diagnosed via left adrenal gland biopsy in 2019.  Currently on observation with prior therapy including maintenance  pembrolizumab, last dose 08/02/2023.  Most recent CT in November 2024  showing stable tumor burden with decreasing pancreatic volume.  Patient additionally has history of CLL, reflected by splenomegaly on  prior exam..    COMPARISON:  CT chest abdomen and pelvis 11/04/2024    ACCESSION NUMBER(S):  [id number]    ORDERING CLINICIAN:  MARGOT BARRIOS    TECHNIQUE:  CT of the chest, abdomen, and pelvis was performed. Contiguous axial  images were obtained at 3 mm slice thickness through the chest,  abdomen and pelvis. Coronal and sagittal reconstructions at 3 mm  slice thickness were performed.  75 MLOmnipaque 350 were administered intravenously without immediate  complication.    FINDINGS:  CHEST:    LUNG/PLEURA/LARGE AIRWAYS:  The central airways are clear. No evidence of pulmonary masses or  pleural effusions.    Right upper lobe consolidative opacity with associated traction  bronchiectasis is stable and representative of postradiation changes.  Scattered solid and ground-glass nodules are stable from prior with  measurements as follows:    Right upper lobe ground-glass nodule measuring up to 2.3 cm (series  3, image 83). Solid right upper lobe nodule measuring 0.3 cm (series  3, image 78). Ground-glass nodule within the right lower lobe  measuring 0.8 cm (series 3, image 189). Left upper lobe ground-glass  nodule measuring up to 2.3 cm (series 3, image 157). Left lower lobe  ground-glass nodule measuring up to 2.5 cm (series 3, image 183).    VESSELS:  Aorta and pulmonary arteries are normal caliber. No atherosclerotic  changes of the aorta are identified. No coronary artery  calcifications are present.    HEART:  The heart is normal in size. No pericardial effusion    MEDIASTINUM AND MATTHEW:  No mediastinal, hilar or axillary lymphadenopathy is present. A small  hiatal hernia is present.    CHEST WALL AND LOWER NECK:  The soft tissues of the chest wall demonstrate no gross abnormality.  Stable appearance of coarse calcification within the left  thyroid  lobe.    ABDOMEN:    LIVER:  The liver is normal in size without evidence of focal liver lesions.    BILE DUCTS:  The intrahepatic and extrahepatic ducts are not dilated.    GALLBLADDER:  Focal wall thickening at the gallbladder fundus is stable from prior  (series 2, image 115). May correspond with underlying adenomyomatosis.    PANCREAS:  Stable atrophic appearance of the pancreas.    SPLEEN:  The spleen is enlarged measuring 14.4 cm in craniocaudal extension,  slightly decreased from prior measurement of 15.3 cm.    ADRENAL GLANDS:  There is a nodule in the right adrenal gland measuring 2.1 x 0.9 cm,  stable from prior. An additional nodule is seen at the left adrenal  gland measuring 2.8 x 1.0 cm (series 2, image 101), also stable from  prior..    KIDNEYS AND URETERS:  The kidneys are normal in size and enhance symmetrically. Stable  appearance of cortical cysts within superior pole of the left kidney.  Scattered bilateral peripelvic cysts are also stable. No  hydroureteronephrosis or nephroureterolithiasis is identified.    PELVIS:    BLADDER:  The urinary bladder appears normal without abnormal wall thickening.    REPRODUCTIVE ORGANS:  The uterus is present and features a leiomyomatous appearance with  large bulky fibroid measuring up to 6.7 x 5.1 cm (series 2, image  164), stable from prior.    BOWEL:  The stomach is unremarkable. The small bowel is not abnormally  dilated. The large bowel demonstrates multiple diverticula without  inflammation. Normal appendix.      VESSELS:  The aorta and IVC appear normal.    PERITONEUM/RETROPERITONEUM/LYMPH NODES:  There is no free or loculated fluid collection, no free  intraperitoneal air. The retroperitoneum appears normal. No  abdominopelvic lymphadenopathy is present.    BONE AND SOFT TISSUE:  No suspicious osseous lesions are identified. Degenerative discogenic  disease is noted in the lower thoracic and lumbar spine. The  abdominal wall soft tissues appear  normal.    Impression  Restaging of metastatic lung adenocarcinoma, as compared to CT  11/04/2024:  1. Overall stable tumor burden as evidenced by stable pulmonary  nodules without evidence of new disease.  2. Decreasing splenomegaly and stable atrophic appearance of the  pancreas.  3. Additional chronic findings are unchanged as detailed above.    I personally reviewed the images/study and I agree with the findings  as stated by Luis Eduardo Bueno MD. This study was interpreted at  [place], [place].    MACRO:  None    Signed by: Anthony Simpson 2/6/2025 7:48 PM  Dictation workstation:   [id number]     Plan:  Assessment/Plan   79 year old female 3 years and 1 months s/p RT to the left adrenal gland for metastatic lung cancer. Patient is doing well. No acute complaints related to treatment. CT CAP from February was stable. No longer on immunotherapy due to elevated WBC counts.  Continue imaging and follow up with Dr. King as scheduled. Patient  diagnosed with Atypical CLL. Continue systemic therapy and follow up with Dr. Huang as scheduled. Patient to return to clinic in 6 months unless needs to be seen sooner. Instructed  to call with any questions or concerns.       I performed this visit using real- time telehealth tools , including an audio/video or telephone connection between Herlinda Springer  , who is in their home and Tameka Candelaria CNP at [facility].

## 2025-05-15 ENCOUNTER — APPOINTMENT (OUTPATIENT)
Dept: OPHTHALMOLOGY | Facility: CLINIC | Age: 80
End: 2025-05-15
Payer: MEDICARE

## 2025-05-15 DIAGNOSIS — H40.1131 PRIMARY OPEN-ANGLE GLAUCOMA, BILATERAL, MILD STAGE: Primary | ICD-10-CM

## 2025-05-15 DIAGNOSIS — Z96.1 PSEUDOPHAKIA OF BOTH EYES: ICD-10-CM

## 2025-05-15 PROCEDURE — 99213 OFFICE O/P EST LOW 20 MIN: CPT | Performed by: OPHTHALMOLOGY

## 2025-05-15 ASSESSMENT — ENCOUNTER SYMPTOMS
PSYCHIATRIC NEGATIVE: 0
GASTROINTESTINAL NEGATIVE: 0
CARDIOVASCULAR NEGATIVE: 0
RESPIRATORY NEGATIVE: 0
EYES NEGATIVE: 1
CONSTITUTIONAL NEGATIVE: 0
HEMATOLOGIC/LYMPHATIC NEGATIVE: 0
MUSCULOSKELETAL NEGATIVE: 0
ENDOCRINE NEGATIVE: 0
NEUROLOGICAL NEGATIVE: 0
ALLERGIC/IMMUNOLOGIC NEGATIVE: 0

## 2025-05-15 ASSESSMENT — SLIT LAMP EXAM - LIDS
COMMENTS: GOOD POSITION
COMMENTS: GOOD POSITION

## 2025-05-15 ASSESSMENT — PACHYMETRY
OD_CT(UM): 570
OS_CT(UM): 567

## 2025-05-15 ASSESSMENT — EXTERNAL EXAM - LEFT EYE: OS_EXAM: NORMAL

## 2025-05-15 ASSESSMENT — VISUAL ACUITY
METHOD: SNELLEN - LINEAR
OD_SC: 20/20
OS_SC: 20/25-2

## 2025-05-15 ASSESSMENT — EXTERNAL EXAM - RIGHT EYE: OD_EXAM: NORMAL

## 2025-05-15 ASSESSMENT — TONOMETRY
OS_IOP_MMHG: 11
OD_IOP_MMHG: 12
IOP_METHOD: GOLDMANN APPLANATION

## 2025-05-15 NOTE — PROGRESS NOTES
Visual Acuity (Snellen - Linear)         Right Left    Dist sc 20/20 20/25-2          Tonometry       Tonometry (Goldmann Applanation, 10:11 AM)         Right Left    Pressure 12 11                  Assessment/Plan   Last dilated:  8/15/24  Last gonio 1/8/25 OU:  D45r 1+ PTM (hydrus nasally with PAS to antrum)    Pt diagnosed with Stage 4 lung CA since Nov 2019 - doing well (just on Ketruda now)    1.  Early Primary Open-Angle Glaucoma OU:  /570.  IOP has some fluctuation, but there has been years of stable VFs and no evidence of progression.  Pt more comfortable OD off the zioptan.  s/p CE+IOL+Hydrus OD 6/7/22 (preop VA 20/60, IOP 12 mmHg).  s/p CE+IOL+Hydrus OS 7/20/22:  pre-op VA 20/30 glare to 20/50 and IOP 13 mmhg.      IOPs are well controlled without medication (previously tolerated zioptan)      Plan:  cont no Rx                f/u 4 months (HVF, dilation, RNFL)    2.  Pseudophakia (PCIOL) OU:  very early PCO forming, but would not advise YAG cap as she is minimally symptomatic      Plan:  as above    3.  h/o narrow angles OU:  s/p LPI OS 4/17/21 and OD 6/25/21.  patent LPIs with deepening of angles      Plan:  monitor

## 2025-05-16 ENCOUNTER — SPECIALTY PHARMACY (OUTPATIENT)
Dept: PHARMACY | Facility: CLINIC | Age: 80
End: 2025-05-16

## 2025-05-16 PROCEDURE — RXMED WILLOW AMBULATORY MEDICATION CHARGE

## 2025-05-21 ENCOUNTER — TELEPHONE (OUTPATIENT)
Dept: ADMISSION | Facility: HOSPITAL | Age: 80
End: 2025-05-21
Payer: MEDICARE

## 2025-05-21 ENCOUNTER — PHARMACY VISIT (OUTPATIENT)
Dept: PHARMACY | Facility: CLINIC | Age: 80
End: 2025-05-21
Payer: COMMERCIAL

## 2025-05-21 DIAGNOSIS — F41.9 ANXIETY: ICD-10-CM

## 2025-05-21 RX ORDER — ALPRAZOLAM 0.5 MG/1
0.5 TABLET ORAL 3 TIMES DAILY PRN
Qty: 90 TABLET | Refills: 0 | Status: SHIPPED | OUTPATIENT
Start: 2025-05-21 | End: 2025-06-20

## 2025-05-21 NOTE — TELEPHONE ENCOUNTER
Herlinda Springer called the refill line for Alprazolam 0.5mg #90. Would like refills to be sent to [facility] pharmacy on file. Message sent to team to send in.

## 2025-06-11 ENCOUNTER — SPECIALTY PHARMACY (OUTPATIENT)
Dept: PHARMACY | Facility: CLINIC | Age: 80
End: 2025-06-11

## 2025-06-11 PROCEDURE — RXMED WILLOW AMBULATORY MEDICATION CHARGE

## 2025-06-13 ENCOUNTER — DOCUMENTATION (OUTPATIENT)
Dept: PHYSICAL THERAPY | Facility: HOSPITAL | Age: 80
End: 2025-06-13
Payer: MEDICARE

## 2025-06-13 NOTE — PROGRESS NOTES
Physical Therapy    Discharge Summary    Name: Herlinda Springer  MRN: [id number]  : 1945  Date: 25    Discharge Summary: PT    Discharge Information: Date of discharge 25, Date of last visit 25, Date of evaluation 25, Number of attended visits 1, and Referred for chronic bilateral low back pain    Therapy Summary: Pt was evaluated by a physical therapist and issued an HEP. Pt failed to make any follow-up appointments.     Rehab Discharge Reason: Failed to schedule and/or keep follow-up appointment(s)

## 2025-06-17 ENCOUNTER — PHARMACY VISIT (OUTPATIENT)
Dept: PHARMACY | Facility: CLINIC | Age: 80
End: 2025-06-17
Payer: COMMERCIAL

## 2025-06-18 ENCOUNTER — LAB (OUTPATIENT)
Dept: LAB | Facility: HOSPITAL | Age: 80
End: 2025-06-18
Payer: MEDICARE

## 2025-06-18 DIAGNOSIS — C91.10 CLL (CHRONIC LYMPHOCYTIC LEUKEMIA) (MULTI): ICD-10-CM

## 2025-06-18 DIAGNOSIS — C34.11 PRIMARY MALIGNANT NEOPLASM OF RIGHT UPPER LOBE OF LUNG (MULTI): ICD-10-CM

## 2025-06-18 LAB
ALBUMIN SERPL BCP-MCNC: 4 G/DL (ref 3.4–5)
ALP SERPL-CCNC: 46 U/L (ref 33–136)
ALT SERPL W P-5'-P-CCNC: 10 U/L (ref 7–45)
ANION GAP SERPL CALC-SCNC: 11 MMOL/L (ref 10–20)
AST SERPL W P-5'-P-CCNC: 14 U/L (ref 9–39)
BASOPHILS # BLD AUTO: 0.17 X10*3/UL (ref 0–0.1)
BASOPHILS NFR BLD AUTO: 0.3 %
BILIRUB SERPL-MCNC: 0.5 MG/DL (ref 0–1.2)
BUN SERPL-MCNC: 17 MG/DL (ref 6–23)
CALCIUM SERPL-MCNC: 8.8 MG/DL (ref 8.6–10.3)
CHLORIDE SERPL-SCNC: 107 MMOL/L (ref 98–107)
CO2 SERPL-SCNC: 26 MMOL/L (ref 21–32)
CREAT SERPL-MCNC: 1.05 MG/DL (ref 0.5–1.05)
EGFRCR SERPLBLD CKD-EPI 2021: 53 ML/MIN/1.73M*2
EOSINOPHIL # BLD AUTO: 0.42 X10*3/UL (ref 0–0.4)
EOSINOPHIL NFR BLD AUTO: 0.8 %
ERYTHROCYTE [DISTWIDTH] IN BLOOD BY AUTOMATED COUNT: 13.4 % (ref 11.5–14.5)
GLUCOSE SERPL-MCNC: 82 MG/DL (ref 74–99)
HCT VFR BLD AUTO: 33.2 % (ref 36–46)
HGB BLD-MCNC: 10.6 G/DL (ref 12–16)
IMM GRANULOCYTES # BLD AUTO: 0.11 X10*3/UL (ref 0–0.5)
IMM GRANULOCYTES NFR BLD AUTO: 0.2 % (ref 0–0.9)
LDH SERPL L TO P-CCNC: 114 U/L (ref 84–246)
LYMPHOCYTES # BLD AUTO: 48.05 X10*3/UL (ref 0.8–3)
LYMPHOCYTES NFR BLD AUTO: 88.4 %
MCH RBC QN AUTO: 30.4 PG (ref 26–34)
MCHC RBC AUTO-ENTMCNC: 31.9 G/DL (ref 32–36)
MCV RBC AUTO: 95 FL (ref 80–100)
MONOCYTES # BLD AUTO: 1.08 X10*3/UL (ref 0.05–0.8)
MONOCYTES NFR BLD AUTO: 2 %
NEUTROPHILS # BLD AUTO: 4.51 X10*3/UL (ref 1.6–5.5)
NEUTROPHILS NFR BLD AUTO: 8.3 %
NRBC BLD-RTO: 0 /100 WBCS (ref 0–0)
PLATELET # BLD AUTO: 143 X10*3/UL (ref 150–450)
POTASSIUM SERPL-SCNC: 3.7 MMOL/L (ref 3.5–5.3)
PROT SERPL-MCNC: 6 G/DL (ref 6.4–8.2)
RBC # BLD AUTO: 3.49 X10*6/UL (ref 4–5.2)
RBC MORPH BLD: NORMAL
SODIUM SERPL-SCNC: 140 MMOL/L (ref 136–145)
WBC # BLD AUTO: 54.3 X10*3/UL (ref 4.4–11.3)

## 2025-06-18 PROCEDURE — 85025 COMPLETE CBC W/AUTO DIFF WBC: CPT

## 2025-06-18 PROCEDURE — 83615 LACTATE (LD) (LDH) ENZYME: CPT

## 2025-06-18 PROCEDURE — 36415 COLL VENOUS BLD VENIPUNCTURE: CPT

## 2025-06-18 PROCEDURE — 84075 ASSAY ALKALINE PHOSPHATASE: CPT

## 2025-06-19 ENCOUNTER — APPOINTMENT (OUTPATIENT)
Dept: HEMATOLOGY/ONCOLOGY | Facility: HOSPITAL | Age: 80
End: 2025-06-19
Payer: MEDICARE

## 2025-06-19 ENCOUNTER — HOSPITAL ENCOUNTER (OUTPATIENT)
Dept: RADIOLOGY | Facility: HOSPITAL | Age: 80
Discharge: HOME | End: 2025-06-19
Payer: MEDICARE

## 2025-06-19 DIAGNOSIS — C34.11 PRIMARY MALIGNANT NEOPLASM OF RIGHT UPPER LOBE OF LUNG (MULTI): ICD-10-CM

## 2025-06-19 PROCEDURE — 2550000001 HC RX 255 CONTRASTS: Performed by: INTERNAL MEDICINE

## 2025-06-19 PROCEDURE — 74177 CT ABD & PELVIS W/CONTRAST: CPT

## 2025-06-19 RX ADMIN — IOHEXOL 75 ML: 350 INJECTION, SOLUTION INTRAVENOUS at 09:33

## 2025-06-20 DIAGNOSIS — F41.9 ANXIETY: ICD-10-CM

## 2025-06-21 RX ORDER — ALPRAZOLAM 0.5 MG/1
0.5 TABLET ORAL 3 TIMES DAILY PRN
Qty: 90 TABLET | Refills: 0 | Status: SHIPPED | OUTPATIENT
Start: 2025-06-21 | End: 2025-07-21

## 2025-06-22 ASSESSMENT — ENCOUNTER SYMPTOMS
APPETITE CHANGE: 0
BLOOD IN STOOL: 0
FATIGUE: 0
DIAPHORESIS: 0
NAUSEA: 0
BRUISES/BLEEDS EASILY: 1
VOMITING: 0
CONSTIPATION: 1
CARDIOVASCULAR NEGATIVE: 1
CHILLS: 0
RESPIRATORY NEGATIVE: 1
DIARRHEA: 0
MUSCULOSKELETAL NEGATIVE: 1
NEUROLOGICAL NEGATIVE: 1
FEVER: 0
ROS SKIN COMMENTS: BRUISING
UNEXPECTED WEIGHT CHANGE: 0

## 2025-06-22 NOTE — PROGRESS NOTES
Patient ID: Herlinda Springer is a 79 y.o. female.    Diagnosis:   Problem List Items Addressed This Visit       CLL (chronic lymphocytic leukemia) (Multi)    Relevant Orders    Immunoglobulins (IgG, IgA, IgM)       Treatment:   Oncology History Overview Note   Lymphocytosis 11/2023 CLL vs marginal zone lymphoma    Flow showing a CD5 positive, CD23 positive lymphoproliferative disorder, strong expression of CD20 and [id number] and surface light chain atypical for CLL and MZL vs LPL was favored, who was referred by thoracic oncologist Dr Alva for workup of lymphocytosis. Patient has had very mild leukocytosis 12K since 3/2023, however increase to 22.3 in November with preserved hgb and platelets..     Additional database:     SPEP faint IgM kappa and free lambd lyte chsins too low to quantitate, B2 2.9,  ( nl)  IgH heavy chain, not mutated  NGS results:   DISEASE ASSOCIATED GENOMIC FINDINGS:   AUSTIN p.* (NM_000051 c.3574A>T)  TP53 p.N239D (NM_000546 c.715A>G)  TP53 p.T793Rtp*21 ([id number] c.305_306delinsA)     CT scans to followup on her lung cancer 2/26/24 shows no splenomegally and no lymphadenopathy     Metastatic lung cancer (metastasis from lung to other site) (Multi)   10/24/2023 Initial Diagnosis    Metastatic lung cancer (metastasis from lung to other site) (Multi)     Primary malignant neoplasm of right upper lobe of lung (Multi)   10/24/2023 Initial Diagnosis    Primary malignant neoplasm of right upper lobe of lung (Multi)     CLL (chronic lymphocytic leukemia) (Multi)   3/6/2024 Initial Diagnosis    CLL (chronic lymphocytic leukemia) (CMS/HCC)         Past Medical History:  Has been treated for high cholesterol     Past Medical History:   Diagnosis Date    Anxiety     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    Cataract     Chronic lymphocytic leukemia (Multi) 12/02/2024    Episodic paroxysmal hemicrania, 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    Paroxysmal hemicrania     Essential (primary) hypertension 02/15/2018    White coat syndrome with hypertension    Hyperlipidemia 03/30/2016    Hypothyroidism 03/30/2016    Laceration without foreign body of scalp, initial encounter 01/15/2014    Laceration of scalp    Lobar pneumonia, unspecified organism 09/18/2019    Lobar pneumonia    Lung cancer (Multi) 09/10/2020    Melanocytic nevi, unspecified 02/15/2018    Benign mole    Migraine with aura, not intractable, without status migrainosus 02/15/2018    Classic migraine with aura    Other specified disorders of bone density and structure, unspecified site 02/15/2018    Osteopenia    Pain in left ankle and joints of left foot     Chronic pain of left ankle    Pain in left ankle and joints of left foot 05/11/2017    Acute left ankle pain    Pain in left knee 05/11/2017    Acute pain of left knee    Personal history of other diseases of the musculoskeletal system and connective tissue 06/18/2015    History of muscle pain    Personal history of other diseases of the respiratory system 02/15/2018    History of acute sinusitis    Personal history of other endocrine, nutritional and metabolic disease 06/22/2016    History of vitamin D deficiency    Personal history of other specified conditions 09/16/2019    History of chronic cough    Personal history of other specified conditions 02/15/2018    History of shortness of breath    Pituitary tumor 03/30/2016    Primary open-angle glaucoma, left eye, severe stage 10/26/2016    Primary open angle glaucoma of left eye, severe stage    Primary open-angle glaucoma, right eye, moderate stage 04/21/2022    Primary open angle glaucoma of right eye, moderate stage    Rupture of popliteal cyst 06/18/2015    Ruptured Bakers cyst    Unspecified asthma with (acute) exacerbation ([facility]) 09/16/2019    Acute asthma exacerbation    Unspecified blepharitis left eye, unspecified eyelid 10/12/2015    Blepharitis of left eye    Unspecified glaucoma 02/15/2018     Glaucoma     Surgical History:     Past Surgical History:   Procedure Laterality Date    CATARACT EXTRACTION      OTHER SURGICAL HISTORY  10/12/2015    Anal Fissurectomy    TONSILLECTOMY  2014    Tonsillectomy      Family History:  No cancers in family, one sister age 90, sister  in  after falling.   Family History   Problem Relation Name Age of Onset    Hypertension Mother      Migraines Mother      Heart disease Father      Glaucoma Father          suspect     Social History:  Retired , worked as  at  and then at [employer], has been in [place] for 40 years,  for 45 yrs to second . no children. Light Former smoker as a younger adult, born in [place] emig[place] in .   Social History     Tobacco Use    Smoking status: Former     Types: Cigarettes   Vaping Use    Vaping status: Never Used   Substance Use Topics    Alcohol use: Never    Drug use: Never      -------------------------------------------------------------------------------------------------------  Subjective       Ms Springer is a 80 yo female h/o stage IV NSCLC (RUL mass, LT adrenal mets, mediastinal and cervical LN) s/p carbo/pem/pem (carbo given in , pembro finished 2023) and RT now with stable disease (on surveillance), lymphocytosis with flow showing a CD5 positive, CD23 positive lymphoproliferative disorder,  strong expression of CD20 and Sp434h and surface light chain atypical for CLL and  MZL vs LPL was favored, who was referred by thoracic oncologist Dr Alva for workup of lymphocytosis. Patient has had very mild leukocytosis 12K since 3/2023, however increase to 22.3 in November prompted further evaluation. She has preserved hgb and platelets.     Additional database:     SPEP faint IgM kappa and free lambd lyte chsins too low to quantitate, B2 2.9,  ( nl)  IgH heavy chain, not mutated  NGS results:   DISEASE ASSOCIATED GENOMIC FINDINGS:   AUSTIN p.* (NM_000051  c.3574A>T)  TP53 p.N239D (NM_000546 c.715A>G)  TP53 p.M720Ieb*21 (NM_000546 c.305_306delinsA)   CT scans to followup on her lung cancer 2/26/24 shows no splenomegally and no lymphadenopathy    CBC from 10/4/24 shows a wbc of 99.6 up from 70.8, more concerning is hgb 10 down from 11  and platets of 122K  down from 176 compared to  8/13/24. No fevers, night sweats, weight loss. Normal appetite. No abdominal pain, normal bowel movements. No bleeding or bruising. No other concerns. 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  She continues taking Zanubruitinib 160 mg twice daily.  Receiving okay from  Specialty pharmacy. She reports she is doing well and is feeling better.  Energy level is good.  Appetite is good and weight is stable.  Mouth sores have gone away.  Has small echymotic patches on her legs.  Wbc count has been slowly dropping, now 54,000 down from 84,000 two months ago with hgb and platelets in decent range.  Still has mouth sores that come and go, small echymosis that come and go. Pleased with how she is doing.    Continues to follow with Pulmonary for her lung cancer with CT scan on 6/19/25:  1. Overall stable disease burden with sites of disease involving the  lung, and bilateral adrenal glands as described above. No evidence of  new metastatic lesions.  2. Mild persistent similar atrophy of the pancreatic parenchyma  particularly body and tail. Bilateral pars defects of the L5  vertebral body with 8 mm anterolisthesis of L5 on S1. Stable compared  to prior.  3. Progressive slight interval decrease in size of the spleen.  4. Additional chronic findings remain stable compared to prior    Review of Systems   Constitutional:  Negative for appetite change, chills, diaphoresis, fatigue, fever and unexpected weight change.   HENT:   Positive for  "mouth sores.    Respiratory: Negative.     Cardiovascular: Negative.    Gastrointestinal:  Positive for constipation. Negative for blood in stool, diarrhea, nausea and vomiting.   Genitourinary: Negative.     Musculoskeletal: Negative.    Skin:  Positive for rash.        bruising   Neurological: Negative.    Hematological:  Bruises/bleeds easily.   All other systems reviewed and are negative.  -------------------------------------------------------------------------------------------------------  Objective   BSA: 1.59 meters squared  /63   Pulse 87   Temp 36.4 °C (97.5 °F)   Resp 16   Ht (S) 1.605 m (5' 3.19\")   Wt 56.8 kg (125 lb 3.5 oz)   SpO2 100%   BMI 22.05 kg/m²     Physical Exam  Vitals reviewed.   Constitutional:       Appearance: Normal appearance.      Comments: Fit and well looks a decade younger than stated age   HENT:      Head: Normocephalic and atraumatic.      Nose: Nose normal.      Mouth/Throat:      Mouth: Mucous membranes are moist.      Comments: No mouth sores noted  Eyes:      Extraocular Movements: Extraocular movements intact.      Conjunctiva/sclera: Conjunctivae normal.      Pupils: Pupils are equal, round, and reactive to light.   Cardiovascular:      Rate and Rhythm: Normal rate and regular rhythm.      Pulses: Normal pulses.      Heart sounds: Normal heart sounds.   Pulmonary:      Effort: Pulmonary effort is normal.      Breath sounds: Normal breath sounds.   Chest:   Breasts:     Right: Normal.      Left: Normal.   Abdominal:      General: Abdomen is flat. Bowel sounds are normal.      Palpations: Abdomen is soft.   Musculoskeletal:         General: Normal range of motion.      Left lower leg: Edema (mild chronic) present.   Lymphadenopathy:      Comments: No lymphadenopathy   Skin:     General: Skin is warm and dry.      Comments: Small eraser sized sized bruising to face, arms, and bilateral shins   Neurological:      General: No focal deficit present.      Mental " Status: She is alert and oriented to person, place, and time.   Psychiatric:         Mood and Affect: Mood normal.         Behavior: Behavior normal.         Thought Content: Thought content normal.     Performance Status:  Asymptomatic  -------------------------------------------------------------------------------------------------------  Assessment/Plan      Ms Springer is a 80 yo female h/o stage [place] NSCLC (RUL mass, LT adrenal mets, mediastinal and cervical LN) s/p carbo/pem/pem (carbo given in 2021, pembro finished 8/2023) and RT (lung mass and adrenal mass) now with stable disease (on surveillance), new onset CD5, CD23 lymphocytosis with flow c/f MZL vs LPL, HTN, hypothyroidism, asthma, who was referred by thoracic oncologist Dr Alva for workup of lymphocytosis.     1 Atypical CLL  Lymphocytosis first noticed in 3/2023 at 12k, but increased to 22.3 in November 2023.  She has preserved hgb and platelets  and is assymptomatic. ALC doubled in 2023. New borderline splenomegaly on CT in 10/2023.  PB flow showed CD5+ clonal ppl, atypical for CLL, mainly concern for MZL vs LPL.  Additional database notable for TP 53 mutations, absence of MYD88 goes against LPL, so I suspect this is likely an atypical CLL.     According to IPSS score for asymptomatic CLL, she has high risk features in the form of IGHV unmutated and TP53x2 mutation, lymphocytosis>15,000    10/7/24  Patients wbc up to 96K, more concerning however is drop in hgb and platelets. Zanubrutinib recommended and started 10/10/24.  Initially patient developed some  symptoms with vaginitis and some mouth sores, all of which seem to be improving.      CT CAP (2/12/25) from lung cancer screening showing decrease to spleen from 15.3 cm to 14.4 cm.      6/23/25  Labs today show  wbc  down to 54K from 84K in April ( 84) and lymphocyte as below, somewhat reassuring are stable hgb and platelets.  Will continue zanubrutinib at 160  mg po bid.   Follow up visit in 2  months.      Potential next steps are venetoclax with anti CD20 antibody if significant increases in wbc or decline in plats or hgb. Patient reassured that no changes needed at this time. Continue acyclovir  prophylaxis. Reviewed lab tests in detail.    WBC   Date Value Ref Range Status   06/18/2025 54.3 (HH) 4.4 - 11.3 x10*3/uL Final   04/15/2025 84.8 (HH) 4.4 - 11.3 x10*3/uL Final   02/12/2025 87.6 (HH) 4.4 - 11.3 x10*3/uL Final   01/03/2025 129.0 (HH) 4.4 - 11.3 x10*3/uL Final     Comment:     Previous result verified on 1/3/2025 1052 on specimen/case [id number] called with component WBC Auto for procedure CBC and Auto Differential with value 129.0 x10*3/uL.   12/20/2024 118.9 (HH) 4.4 - 11.3 x10*3/uL Final     Hemoglobin   Date Value Ref Range Status   06/18/2025 10.6 (L) 12.0 - 16.0 g/dL Final   04/15/2025 11.1 (L) 12.0 - 16.0 g/dL Final   02/12/2025 10.8 (L) 12.0 - 16.0 g/dL Final   01/03/2025 10.9 (L) 12.0 - 16.0 g/dL Final   12/20/2024 10.3 (L) 12.0 - 16.0 g/dL Final     2. NSCLC adenocarcinoma   - in remission, managed by thoracic oncologists  CT CAP (6/19/25) IMPRESSION:  1. Overall stable disease burden with sites of disease involving the  lung, and bilateral adrenal glands as described above. No evidence of  new metastatic lesions.  2. Mild persistent similar atrophy of the pancreatic parenchyma  particularly body and tail. Bilateral pars defects of the L5  vertebral body with 8 mm anterolisthesis of L5 on S1. Stable compared  to prior.  3. Progressive slight interval decrease in size of the spleen.  4. Additional chronic findings remain stable compared to prior  imaging and are as detailed in the body of the report.    Endometrial Thickening:  Endometrial thickening seen on CT scan from 11/4/24.  Pelvic US performed 12/10/24 with GYN evaluation.  Completed hysteroscopy on 12/24/24 with biopsy.  Biopsy showing normal squamous epithelium.  Last seen by GYN on 1/16/25.      3. Health  maintenance:  Received 9/7/24 COVID, 9/16/24 RSV, and 9/21/24 influenza vaccine.  Mammogram (8/2/24): no mammographic evidence of malignancy.    RTC:    Follow up visit with provider in 2 months.  Advised to obtain blood work prior to visit.  Will check IgG level as reason for low protein  -------------------------------------------------------------------------------------------------------  Sravani Huang MD

## 2025-06-23 ENCOUNTER — OFFICE VISIT (OUTPATIENT)
Dept: HEMATOLOGY/ONCOLOGY | Facility: HOSPITAL | Age: 80
End: 2025-06-23
Payer: MEDICARE

## 2025-06-23 VITALS
HEIGHT: 63 IN | SYSTOLIC BLOOD PRESSURE: 137 MMHG | BODY MASS INDEX: 22.19 KG/M2 | WEIGHT: 125.22 LBS | OXYGEN SATURATION: 100 % | RESPIRATION RATE: 16 BRPM | HEART RATE: 87 BPM | TEMPERATURE: 97.5 F | DIASTOLIC BLOOD PRESSURE: 63 MMHG

## 2025-06-23 DIAGNOSIS — C91.10 CLL (CHRONIC LYMPHOCYTIC LEUKEMIA) (MULTI): ICD-10-CM

## 2025-06-23 PROCEDURE — 3075F SYST BP GE 130 - 139MM HG: CPT | Performed by: INTERNAL MEDICINE

## 2025-06-23 PROCEDURE — 3078F DIAST BP <80 MM HG: CPT | Performed by: INTERNAL MEDICINE

## 2025-06-23 PROCEDURE — 99214 OFFICE O/P EST MOD 30 MIN: CPT | Performed by: INTERNAL MEDICINE

## 2025-06-23 PROCEDURE — 1126F AMNT PAIN NOTED NONE PRSNT: CPT | Performed by: INTERNAL MEDICINE

## 2025-06-23 PROCEDURE — 1159F MED LIST DOCD IN RCRD: CPT | Performed by: INTERNAL MEDICINE

## 2025-06-23 ASSESSMENT — PAIN SCALES - GENERAL: PAINLEVEL_OUTOF10: 0-NO PAIN

## 2025-06-25 ENCOUNTER — OFFICE VISIT (OUTPATIENT)
Dept: HEMATOLOGY/ONCOLOGY | Facility: HOSPITAL | Age: 80
End: 2025-06-25
Payer: MEDICARE

## 2025-06-25 VITALS
BODY MASS INDEX: 21.78 KG/M2 | HEART RATE: 86 BPM | TEMPERATURE: 97 F | SYSTOLIC BLOOD PRESSURE: 134 MMHG | RESPIRATION RATE: 15 BRPM | OXYGEN SATURATION: 100 % | WEIGHT: 123.7 LBS | DIASTOLIC BLOOD PRESSURE: 62 MMHG

## 2025-06-25 DIAGNOSIS — Z79.899 CONTROLLED SUBSTANCE AGREEMENT SIGNED: ICD-10-CM

## 2025-06-25 DIAGNOSIS — C34.11 PRIMARY MALIGNANT NEOPLASM OF RIGHT UPPER LOBE OF LUNG (MULTI): ICD-10-CM

## 2025-06-25 PROCEDURE — 1126F AMNT PAIN NOTED NONE PRSNT: CPT | Performed by: INTERNAL MEDICINE

## 2025-06-25 PROCEDURE — 1159F MED LIST DOCD IN RCRD: CPT | Performed by: INTERNAL MEDICINE

## 2025-06-25 PROCEDURE — 99214 OFFICE O/P EST MOD 30 MIN: CPT | Performed by: INTERNAL MEDICINE

## 2025-06-25 PROCEDURE — G2211 COMPLEX E/M VISIT ADD ON: HCPCS | Performed by: INTERNAL MEDICINE

## 2025-06-25 PROCEDURE — 3075F SYST BP GE 130 - 139MM HG: CPT | Performed by: INTERNAL MEDICINE

## 2025-06-25 PROCEDURE — 3078F DIAST BP <80 MM HG: CPT | Performed by: INTERNAL MEDICINE

## 2025-06-25 ASSESSMENT — PAIN SCALES - GENERAL: PAINLEVEL_OUTOF10: 0-NO PAIN

## 2025-06-25 NOTE — PROGRESS NOTES
Patient ID: Herlinda Springer is a 79 y.o. female    Primary Care Provider: Alley Bui MD    DIAGNOSIS AND STAGING  Stage CLIFTON ([id number]) NSCLC (adenocarcinoma, TTF-1+, Napsin A+) of the RUL   Dx through left adrenal gland bx on 12/17/19     SITES OF DISEASE  Right upper lobe  Left adrenal gland   Brain - left parietal lobe      MOLECULAR GENOMICS  TP53 mutant   EGFR negative  ALK-1 negative   ROS1 negative  BRAF V600E negative  NTRK negative      PD-L1 TPS < 1%     PRIOR THERAPIES  Cycle # 1 carboplatin/pemetrexed/pembrolizumab - 01/15/20, cycle # 4 on 03/18/20  Starts maintenance pemetrexed/pembrolizumab on 04/08/20  Pemetrexed discontinued on 03/01/2023 due to decline GFR'   Last pembrolizumab dose on 08/02/2023     CURRENT THERAPY  Observation     CURRENT ONCOLOGICAL PROBLEMS  MRI brain 12/22/19 - 2 mm enhancing lesion left parietal lobe, unclear if metastasis - repeat MRI 06/26/20 showed resolution of that finding   CT 06/26/20 shows oligo-progression of RUL - left adrenal metastasis stable - s/p RT to the RUL - completed 08/19/20     HISTORY OF PRESENT ILLNESS  This is a former smoker (quit 25 years prior to dx)who presented with new onset respiratory sx (cough), decreased appetite and weight loss  (lost 13 lbs in 2 months).   Chest CT 11/07/19 showed a RUL nodule measuring 4 cm, mediastinal adenopathy (level 2L measuring 1.8 cm, .   A PET scan obtained on 12/03/19 showed FDG uptake in the RUL nodule as well as 1R/1L nodes, bilateral mediastinal and right hilar node. Left adrenal gland was hypermetabolic, c/w metastasis.  On 12/17/19 CT-guided biopsy of the left adrenal gland confirmed the presence of a mucinous adenocarcinoma, TTF-1 positive, Napsin A and CK7 also positive. PD-L1 TPS < 1%, with no actionable mutations, TP53 mutant.   MRI brain 12/22/19 showed a 2 mm enhancement in left temporal lobe that is nonspecific but could be an additional metastatic site.   01/15/20: cycle # 1  carbo/pemetrexed/pembrolizumab   03/18/20: Cycle # 4 carbo/pemetrexed/pembrolizumab   04/08/20: starts maintenance pemetrexed/pembrolizumab   05/26/20: MRI brain shows no intracranial disease  05/26/20: CT C/A/P: increase in size of RUL mass - now measuring ~ 3.2 cm and stability of left adrenal gland - referred to Rad Onc for consideration of SBRT - continues pemetrexed/pembrolizumab maintenance   08/19/20:  completes RT to the RUL  08/20/20: grade 1 rash b/l UEs - treated with topical triamcinolone - continue pemetrexed + pembrolizumab  10/16/20: CT C/A/P: Increased consolidative changes  in the RUL c/w RT-induced fibrosis - no LAD. The adrenal glands look stable - the multiple lung GG opacifications are stable   11/27/20: CT C/A/P - improvement on RUL interstitial changes - there is no sign of PD. The RUL mass has decreased in size from 7 to 5 cm in greatest diameter - left adrenal gland is stable in size.   02/19/21: CT C/A/P personally reviewed by me: the RUL apical lesion that has been irradiated is further  decreased in size, now measuring 3.9 cm x 2 cm and previously on 11/24/21 measuring 4.8 cm x 2.4 cm (solid component). The left adrenal mass measures  3.4 cm and is stable in size. There are no new concerning metastatic lesions,. Multiple areas of GGO remain and are of no clinical significance in this patients with metastatic disease.   05/14/21: CT Chest/abdomen/pelvis personally reviewed by me. The RUL mass remains stable and so do the nodes. However, the GGO opacifications persist - the left adrenal gland is slightly smaller (3.6 cm << 3.8 cm). The right adrenal gland remains stable.      6/30/21: Will continue maintenance pembrolizumab and pemetrexed. Given CrCl, will dose Pemetrexed at 375 mg/m2 today.      08/06/21: CT C/A/P shows further development of interstitial changes in the RUL that look purely inflammatory in nature - there are no concerns for PD - no worsening LAD, the left adrenal mass  remains stable at 3.6 cm. This adrenal gland has been biopsied  at her dx      12/09/21: CT C/A/P shows SD - the RUL changes from prior RT remain stable - no new emerging LAD. Adrenal glands stable      12/15/21: dopplers LE negative for DVT      03/04/2022: CT Chest/Abdomen/Pelvis shows no signs of PD with the exception of left adrenal gland that continues to measure 3.4 cm however has developed an area of necrosis that is  concerning. Both RUL and LLL GGO remain stable measuring 2.7 cm and 2.8 cm respectively      03/25/2022: PET scan demonstrates close to metabolic complete remission, left adrenal gland SUV has decreased from 6.4 to 2.2     4/4/22-4/13/22: Completed SBRT to left adrenal gland.     06/17/2022: CT Chest/Abdomen/Pelvis demonstrates stable findings, stable size of left adrenal gland, stable GGO throughout bilateral lung fields      09/09/2022: CT CAP shows stable disease including stability of L adrenal gland; concern in liver noted on read reviewed and more consistent with fatty infiltration also seen on previous scans      10/07/2022: MRI brain demonstrates no intracranial metastasis.  Findings compatible with mastoiditis but subsequent CT scan ruled out mastoiditis, with a finding of osteopenia.     12/02/2022: CT CAP shows stable disease within the lung, L adrenal gland. Also noted stable pancreatic head & uncinate process lesions thought to represent IPMNs      02/20/2023: CT chest/abdomen/pelvis personally reviewed by me, demonstrating stable changes in the lungs as well as adrenal glands.  No new metastatic sites demonstrated.      03/01/2023: Discussion with patient regards to risks and benefits of continued single agent pembrolizumab in view of her issues with declining GFR associated with pemetrexed infusions.  Decision made to continue on single agent pembrolizumab every 3 weeks  for now     05/18/2023: CT CAP show stable disease without new metastatic sites      08/02/2023: Last  pembrolizumab dose    2023: CT chest/abdomen/pelvis demonstrating stability of findings with no new metastatic sites   Left adrenal measuring 3 cm in greatest diameter     PAST MEDICAL HISTORY  Asthma  HTN  Hypothyroidism   HLD  Glaucoma   Migraines     SURGICAL HISTORY  Anal fissures in her 30's  [age]illectomy 4/4 yo     SOCIAL HISTORY  Smoked 0.5 ppd from ages [age]  Drinks red wine daily   Plays tennis 3 x/week - doubles  Retired  - worked at [employer] and   Speaks 4 languages (Vincentian, Moldovan, Kazakh and English)        FAMILY HISTORY  Maternal aunt  of metastatic cancer to the liver   Has one sister at age 84 who has Parkinson's  One sister  at age 73 of cirrhosis     CURRENT MEDS REVIEWED    ALLERGIES REVIEWED      SUBJECTIVE:  Patient here today with her . Doing well. She did try PT for her arthritis pain after last visit, but felt worse than she did prior so stopped. Is walking consistently. Mouth sores ongoing but manageable. Anxiety is manageable with xanax.    A 13 point review of systems was performed, with significant findings documented above in subjective history.    OBJECTIVE:  Vitals:    25 0915   BP: 134/62   Pulse: 86   Resp: 15   Temp: 36.1 °C (97 °F)   SpO2: 100%      Body surface area is 1.58 meters squared.     Wt Readings from Last 5 Encounters:   25 56.1 kg (123 lb 11.2 oz)   25 56.8 kg (125 lb 3.5 oz)   25 55.7 kg (122 lb 12.7 oz)   25 56.2 kg (123 lb 14.4 oz)   25 56 kg (123 lb 7.3 oz)     ECOGSCORE: 0- Fully active, able to carry on all pre-disease performance w/o restriction.    Physical Exam  Constitutional:       Appearance: Normal appearance. She is normal weight.   HENT:      Head: Normocephalic and atraumatic.   Eyes:      General: No scleral icterus.     Extraocular Movements: Extraocular movements intact.      Conjunctiva/sclera: Conjunctivae normal.   Cardiovascular:      Rate and Rhythm: Normal rate and regular  rhythm.      Heart sounds: Normal heart sounds.   Pulmonary:      Effort: Pulmonary effort is normal.      Breath sounds: Normal breath sounds.   Abdominal:      General: Abdomen is flat.      Palpations: Abdomen is soft. There is no mass.      Tenderness: There is no abdominal tenderness. There is no guarding.   Musculoskeletal:      Cervical back: Neck supple.   Skin:     General: Skin is warm and dry.   Neurological:      General: No focal deficit present.      Mental Status: She is alert and oriented to person, place, and time. Mental status is at baseline.      Motor: No weakness.      Gait: Gait normal.   Psychiatric:         Mood and Affect: Mood normal.         Behavior: Behavior normal.         Thought Content: Thought content normal.         Judgment: Judgment normal.          Diagnostic Results   Results:  Labs:  Lab Results   Component Value Date    WBC 54.3 (HH) 06/18/2025    HGB 10.6 (L) 06/18/2025    HCT 33.2 (L) 06/18/2025    MCV 95 06/18/2025     (L) 06/18/2025      Lab Results   Component Value Date    NEUTROABS 4.51 06/18/2025      Lab Results   Component Value Date    GLUCOSE 82 06/18/2025    CALCIUM 8.8 06/18/2025     06/18/2025    K 3.7 06/18/2025    CO2 26 06/18/2025     06/18/2025    BUN 17 06/18/2025    CREATININE 1.05 06/18/2025    MG 1.69 01/03/2025     Lab Results   Component Value Date    ALT 10 06/18/2025    AST 14 06/18/2025    ALKPHOS 46 06/18/2025    BILITOT 0.5 06/18/2025      Lab Results   Component Value Date    ACTH 36.0 05/22/2024    CORTISOL 15.0 05/22/2024    TSH 0.81 05/22/2024    FREET4 1.26 09/18/2023     Imaging:    I personally reviewed the below imaging and concur with the findings as reported unless otherwise stated    === 06/19/25 ===    CT CHEST ABDOMEN PELVIS W IV CONTRAST    - Impression -  METASTATIC LUNG ADENOCARCINOMA RESTAGING SCAN:    Findings are compared to imaging from 02/06/2025 and 11/04/2024.  1. Overall stable disease burden with sites  of disease involving the  lung, and bilateral adrenal glands as described above. No evidence of  new metastatic lesions.  2. Mild persistent similar atrophy of the pancreatic parenchyma  particularly body and tail. Bilateral pars defects of the L5  vertebral body with 8 mm anterolisthesis of L5 on S1. Stable compared  to prior.  3. Progressive slight interval decrease in size of the spleen.  4. Additional chronic findings remain stable compared to prior  imaging and are as detailed in the body of the report.    I personally reviewed the images/study and I agree with the findings  as stated by Resident Mars Land MD.    MACRO:  None    Signed by: Anthony Simpson 6/19/2025 5:21 PM  Dictation workstation:   [id number]         Assessment/Plan   Stage CLIFTON NSCLC - metastasis to the left adrenal gland  Remains off immunotherapy since August 2023 with no signs or concerns for progression of her disease.  - Scans reviewed, no evidence of progressive disease  Knows to call and reach out if any new symptoms or concerns related to possible progression of her cancer develop, such as unintentional weight loss, fatigue, new onset respiratory symptoms, localized pain.   - Last MRI brain 10/2022, will defer repeating for now  - Repeat CT C/A/P in 6m    She is being treated for CLL by Dr. Sravani Huang.     Anxiety   - meditating, doing Yoga, Des and saleem Chi at the [facility]  - uses Xanax as needed TID (generally does use TID).  - OARRS reviewed, urine drug screen ordered with her next lab visit     Endometrial thickening - further thickening noted on recent CT  - US pelvis showed thickened endometrium  - Referred to gyn, she underwent endometrial polypectomy 12/2024    RTC in 6m with repeat scan prior    Karma King MD  [facility]

## 2025-07-14 ENCOUNTER — SPECIALTY PHARMACY (OUTPATIENT)
Dept: PHARMACY | Facility: CLINIC | Age: 80
End: 2025-07-14

## 2025-07-14 PROCEDURE — RXMED WILLOW AMBULATORY MEDICATION CHARGE

## 2025-07-15 ENCOUNTER — PHARMACY VISIT (OUTPATIENT)
Dept: PHARMACY | Facility: CLINIC | Age: 80
End: 2025-07-15
Payer: COMMERCIAL

## 2025-07-18 ENCOUNTER — APPOINTMENT (OUTPATIENT)
Dept: PRIMARY CARE | Facility: CLINIC | Age: 80
End: 2025-07-18
Payer: MEDICARE

## 2025-07-18 VITALS
OXYGEN SATURATION: 98 % | BODY MASS INDEX: 22.03 KG/M2 | HEART RATE: 86 BPM | TEMPERATURE: 96.4 F | DIASTOLIC BLOOD PRESSURE: 68 MMHG | WEIGHT: 124.34 LBS | HEIGHT: 63 IN | RESPIRATION RATE: 16 BRPM | SYSTOLIC BLOOD PRESSURE: 119 MMHG

## 2025-07-18 DIAGNOSIS — M81.0 MENOPAUSAL OSTEOPOROSIS: ICD-10-CM

## 2025-07-18 DIAGNOSIS — E78.00 PURE HYPERCHOLESTEROLEMIA: ICD-10-CM

## 2025-07-18 DIAGNOSIS — E55.9 VITAMIN D DEFICIENCY, UNSPECIFIED: ICD-10-CM

## 2025-07-18 DIAGNOSIS — Z00.00 WELLNESS EXAMINATION: ICD-10-CM

## 2025-07-18 DIAGNOSIS — Z12.31 VISIT FOR SCREENING MAMMOGRAM: ICD-10-CM

## 2025-07-18 DIAGNOSIS — Z00.00 ROUTINE GENERAL MEDICAL EXAMINATION AT HEALTH CARE FACILITY: Primary | ICD-10-CM

## 2025-07-18 DIAGNOSIS — I10 ESSENTIAL HYPERTENSION: ICD-10-CM

## 2025-07-18 DIAGNOSIS — E03.9 ACQUIRED HYPOTHYROIDISM: ICD-10-CM

## 2025-07-18 PROCEDURE — 3078F DIAST BP <80 MM HG: CPT | Performed by: INTERNAL MEDICINE

## 2025-07-18 PROCEDURE — G0439 PPPS, SUBSEQ VISIT: HCPCS | Performed by: INTERNAL MEDICINE

## 2025-07-18 PROCEDURE — 99214 OFFICE O/P EST MOD 30 MIN: CPT | Performed by: INTERNAL MEDICINE

## 2025-07-18 PROCEDURE — 1170F FXNL STATUS ASSESSED: CPT | Performed by: INTERNAL MEDICINE

## 2025-07-18 PROCEDURE — 1126F AMNT PAIN NOTED NONE PRSNT: CPT | Performed by: INTERNAL MEDICINE

## 2025-07-18 PROCEDURE — 3074F SYST BP LT 130 MM HG: CPT | Performed by: INTERNAL MEDICINE

## 2025-07-18 PROCEDURE — 1159F MED LIST DOCD IN RCRD: CPT | Performed by: INTERNAL MEDICINE

## 2025-07-18 ASSESSMENT — ENCOUNTER SYMPTOMS
DEPRESSION: 0
BACK PAIN: 0
ABDOMINAL DISTENTION: 0
CHILLS: 0
BLOOD IN STOOL: 0
ARTHRALGIAS: 0
OCCASIONAL FEELINGS OF UNSTEADINESS: 0
ABDOMINAL PAIN: 0
NECK STIFFNESS: 0
LOSS OF SENSATION IN FEET: 0
FEVER: 0
DIAPHORESIS: 0
CONSTIPATION: 0

## 2025-07-18 ASSESSMENT — ACTIVITIES OF DAILY LIVING (ADL)
DRESSING: INDEPENDENT
DOING_HOUSEWORK: INDEPENDENT
MANAGING_FINANCES: INDEPENDENT
GROCERY_SHOPPING: INDEPENDENT
TAKING_MEDICATION: INDEPENDENT
BATHING: INDEPENDENT

## 2025-07-18 ASSESSMENT — PATIENT HEALTH QUESTIONNAIRE - PHQ9
1. LITTLE INTEREST OR PLEASURE IN DOING THINGS: NOT AT ALL
SUM OF ALL RESPONSES TO PHQ9 QUESTIONS 1 AND 2: 0
2. FEELING DOWN, DEPRESSED OR HOPELESS: NOT AT ALL

## 2025-07-18 ASSESSMENT — PAIN SCALES - GENERAL: PAINLEVEL_OUTOF10: 0-NO PAIN

## 2025-07-18 NOTE — PROGRESS NOTES
Subjective   Reason for Visit: Herlinda Springer is an 79 y.o. female here for a Medicare Wellness visit.     Past Medical, Surgical, and Family History reviewed and updated in chart.    Reviewed all medications by prescribing practitioner or clinical pharmacist (such as prescriptions, OTCs, herbal therapies and supplements) and documented in the medical record.    HPI  Pt continue doing yoga, walks, and rebounder exercise. She consumes any kind of protein, vegetable and starch. ETOH small amount of wine.   Pt developed Chronic lymphocitic Leukemia , strarted treatment with zanubrutinib and acyclovir, took also for 1 month allopurinol. . Side effects rashes and mouth sores. Also follows with dermatologist.  Base in imaging, she required endometrial biopsy that was benign, and polyps removed in December 2024  Her Nonsmallcell Lung adenocarcinoma is in remission, last ct chest abdomen from June 2025.     Patient Care Team:  Alley Bui MD as PCP - General (Internal Medicine)  Cathy Alva MD as Consulting Physician (Hematology and Oncology)  Sravani Huang MD as Consulting Physician (Hematology and Oncology)  Connie Quinones RN as Registered Nurse (Hematology and Oncology)  Maxwell Camp RN as Registered Nurse (Hematology and Oncology)  Laz Hernandez MD as Consulting Physician (Hematology and Oncology)  Karma King MD as Consulting Physician (Hematology and Oncology)     Review of Systems   Constitutional:  Negative for chills, diaphoresis and fever.   Gastrointestinal:  Negative for abdominal distention, abdominal pain, blood in stool and constipation.        Bm daily using metamucil and zenacot .   Musculoskeletal:  Negative for arthralgias, back pain and neck stiffness.        Leg cramps are control with magnesium   Psychiatric/Behavioral:          Sleeps about 7 hours with ziquil and alprazolam   All other systems reviewed and are negative.      Objective   Vitals:  /68 (BP Location: Left  "arm, Patient Position: Sitting)   Pulse 86   Temp 35.8 °C (96.4 °F) (Temporal)   Resp 16   Ht 1.6 m (5' 3\")   Wt 56.4 kg (124 lb 5.4 oz)   SpO2 98%   BMI 22.03 kg/m²       Physical Exam  Eyes - conjunctivae clear, PERRLA  HEENT - no impacted wax, no sinus tenderness  Neck - no cervical lymphadenopathy, no thyromegaly  Axilla - no palpable lymphadenopathy  Breast no palpable nodularities  Cardiac- regular rate and rhythm, no murmurs, no carotid bruit, no JVP  Lung - clear to auscultation, no rales, no rhonchi, no wheezing  GI - normally active bowel sounds, non tender, non distended, no hepatosplenomegaly, no rebound  MSK - non deformities  Extremities - no edema, good distal pulses  Neuro - non focal, oriented x 3  Skin - no bruises, small red hyperpigmented lession  Psychiatric - pleasant, well groom, no hallucinations    Assessment & Plan  Pure hypercholesterolemia  Pending new lipid pannel  Orders:    Lipid panel; Future    Essential hypertension  Well control with non medical intervention       Wellness examination  Discussed diet, exercise, immunizations, and screening appropriate for their age.  Colonoscopy: no longer  Dexa: due  Mammogram: August 2024, ordered             Visit for screening mammogram    Orders:    BI mammo bilateral screening tomosynthesis; Future    Menopausal osteoporosis    Orders:    XR DEXA bone density; Future    Routine general medical examination at health care facility    Orders:    1 Year Follow Up In Primary Care - Wellness Exam; Future    Vitamin D deficiency, unspecified    Orders:    Vitamin D 25-Hydroxy,Total (for eval of Vitamin D levels); Future    Acquired hypothyroidism  Pending lab to adjust dose,   Orders:    TSH with reflex to Free T4 if abnormal; Future              "

## 2025-07-18 NOTE — ASSESSMENT & PLAN NOTE
Discussed diet, exercise, immunizations, and screening appropriate for their age.  Colonoscopy: no longer  Dexa: due  Mammogram: August 2024, ordered

## 2025-07-18 NOTE — PATIENT INSTRUCTIONS
Consider tetanus/whooping cough vaccine booster. Complete mammogram and bone density, 24 prior to bone d do no take calcium, antiacids or tums. Return fasting to labs. Return sooner than 1 year.

## 2025-07-22 ENCOUNTER — TELEPHONE (OUTPATIENT)
Dept: ADMISSION | Facility: HOSPITAL | Age: 80
End: 2025-07-22
Payer: MEDICARE

## 2025-07-22 DIAGNOSIS — F41.9 ANXIETY: ICD-10-CM

## 2025-07-22 RX ORDER — ALPRAZOLAM 0.5 MG/1
0.5 TABLET ORAL 3 TIMES DAILY PRN
Qty: 90 TABLET | Refills: 0 | Status: SHIPPED | OUTPATIENT
Start: 2025-07-22 | End: 2025-08-21

## 2025-07-22 NOTE — TELEPHONE ENCOUNTER
Refill Request  Alprazolam (Xanax) 0.5mg 3 times daily PRN    Preferred Pharmacy  [facility] #[id number] [facility]

## 2025-07-23 DIAGNOSIS — E78.5 HYPERLIPIDEMIA, UNSPECIFIED HYPERLIPIDEMIA TYPE: ICD-10-CM

## 2025-07-23 RX ORDER — ROSUVASTATIN CALCIUM 10 MG/1
5 TABLET, COATED ORAL DAILY
Qty: 45 TABLET | Refills: 3 | Status: SHIPPED | OUTPATIENT
Start: 2025-07-23

## 2025-07-28 ENCOUNTER — TELEPHONE (OUTPATIENT)
Dept: ADMISSION | Facility: HOSPITAL | Age: 80
End: 2025-07-28
Payer: MEDICARE

## 2025-07-28 DIAGNOSIS — C91.10 CLL (CHRONIC LYMPHOCYTIC LEUKEMIA) (MULTI): ICD-10-CM

## 2025-07-28 RX ORDER — ACYCLOVIR 400 MG/1
400 TABLET ORAL 2 TIMES DAILY
Qty: 60 TABLET | Refills: 11 | Status: SHIPPED | OUTPATIENT
Start: 2025-07-28 | End: 2026-07-23

## 2025-08-04 ENCOUNTER — HOSPITAL ENCOUNTER (OUTPATIENT)
Dept: RADIOLOGY | Facility: HOSPITAL | Age: 80
Discharge: HOME | End: 2025-08-04
Payer: MEDICARE

## 2025-08-04 DIAGNOSIS — Z12.31 VISIT FOR SCREENING MAMMOGRAM: ICD-10-CM

## 2025-08-04 PROCEDURE — 77063 BREAST TOMOSYNTHESIS BI: CPT

## 2025-08-04 PROCEDURE — 77067 SCR MAMMO BI INCL CAD: CPT | Performed by: STUDENT IN AN ORGANIZED HEALTH CARE EDUCATION/TRAINING PROGRAM

## 2025-08-04 PROCEDURE — 77063 BREAST TOMOSYNTHESIS BI: CPT | Performed by: STUDENT IN AN ORGANIZED HEALTH CARE EDUCATION/TRAINING PROGRAM

## 2025-08-06 ENCOUNTER — TELEPHONE (OUTPATIENT)
Dept: PRIMARY CARE | Facility: CLINIC | Age: 80
End: 2025-08-06
Payer: MEDICARE

## 2025-08-06 DIAGNOSIS — R92.8 ABNORMAL MAMMOGRAM: ICD-10-CM

## 2025-08-07 ENCOUNTER — TELEPHONE (OUTPATIENT)
Dept: PRIMARY CARE | Facility: CLINIC | Age: 80
End: 2025-08-07

## 2025-08-07 ENCOUNTER — HOSPITAL ENCOUNTER (OUTPATIENT)
Dept: RADIOLOGY | Facility: CLINIC | Age: 80
Discharge: HOME | End: 2025-08-07
Payer: MEDICARE

## 2025-08-07 DIAGNOSIS — R92.8 ABNORMAL MAMMOGRAM OF RIGHT BREAST: ICD-10-CM

## 2025-08-07 DIAGNOSIS — R92.8 ABNORMAL MAMMOGRAM: ICD-10-CM

## 2025-08-07 PROCEDURE — 77065 DX MAMMO INCL CAD UNI: CPT | Mod: RIGHT SIDE | Performed by: RADIOLOGY

## 2025-08-07 PROCEDURE — 77061 BREAST TOMOSYNTHESIS UNI: CPT | Mod: RIGHT SIDE | Performed by: RADIOLOGY

## 2025-08-07 PROCEDURE — 76642 ULTRASOUND BREAST LIMITED: CPT | Mod: RIGHT SIDE | Performed by: RADIOLOGY

## 2025-08-07 PROCEDURE — 76982 USE 1ST TARGET LESION: CPT

## 2025-08-07 PROCEDURE — 77061 BREAST TOMOSYNTHESIS UNI: CPT | Mod: RT

## 2025-08-07 PROCEDURE — 76642 ULTRASOUND BREAST LIMITED: CPT | Mod: RT

## 2025-08-11 ENCOUNTER — SPECIALTY PHARMACY (OUTPATIENT)
Dept: PHARMACY | Facility: CLINIC | Age: 80
End: 2025-08-11

## 2025-08-11 PROCEDURE — RXMED WILLOW AMBULATORY MEDICATION CHARGE

## 2025-08-12 ENCOUNTER — PHARMACY VISIT (OUTPATIENT)
Dept: PHARMACY | Facility: CLINIC | Age: 80
End: 2025-08-12
Payer: COMMERCIAL

## 2025-08-24 ASSESSMENT — DERMATOLOGY QUALITY OF LIFE (QOL) ASSESSMENT
RATE HOW EMOTIONALLY BOTHERED YOU ARE BY YOUR SKIN PROBLEM (FOR EXAMPLE, WORRY, EMBARRASSMENT, FRUSTRATION): 1
RATE HOW EMOTIONALLY BOTHERED YOU ARE BY YOUR SKIN PROBLEM (FOR EXAMPLE, WORRY, EMBARRASSMENT, FRUSTRATION): 1
RATE HOW BOTHERED YOU ARE BY EFFECTS OF YOUR SKIN PROBLEMS ON YOUR ACTIVITIES (EG, GOING OUT, ACCOMPLISHING WHAT YOU WANT, WORK ACTIVITIES OR YOUR RELATIONSHIPS WITH OTHERS): 0 - NEVER BOTHERED
RATE HOW BOTHERED YOU ARE BY SYMPTOMS OF YOUR SKIN PROBLEM (EG, ITCHING, STINGING BURNING, HURTING OR SKIN IRRITATION): 0 - NEVER BOTHERED
RATE HOW BOTHERED YOU ARE BY SYMPTOMS OF YOUR SKIN PROBLEM (EG, ITCHING, STINGING BURNING, HURTING OR SKIN IRRITATION): 0 - NEVER BOTHERED
RATE HOW BOTHERED YOU ARE BY EFFECTS OF YOUR SKIN PROBLEMS ON YOUR ACTIVITIES (EG, GOING OUT, ACCOMPLISHING WHAT YOU WANT, WORK ACTIVITIES OR YOUR RELATIONSHIPS WITH OTHERS): 0 - NEVER BOTHERED

## 2025-08-25 ENCOUNTER — OFFICE VISIT (OUTPATIENT)
Dept: DERMATOLOGY | Facility: HOSPITAL | Age: 80
End: 2025-08-25
Payer: MEDICARE

## 2025-08-25 DIAGNOSIS — D22.9 MULTIPLE BENIGN NEVI: Primary | ICD-10-CM

## 2025-08-25 DIAGNOSIS — Z12.83 SCREENING EXAM FOR SKIN CANCER: ICD-10-CM

## 2025-08-25 DIAGNOSIS — K12.1 ORAL ULCERATION: ICD-10-CM

## 2025-08-25 DIAGNOSIS — L82.1 SEBORRHEIC KERATOSIS: ICD-10-CM

## 2025-08-25 DIAGNOSIS — L81.4 LENTIGO: ICD-10-CM

## 2025-08-25 DIAGNOSIS — L81.7 PIGMENTED PURPURIC DERMATOSIS: ICD-10-CM

## 2025-08-25 PROCEDURE — 99213 OFFICE O/P EST LOW 20 MIN: CPT | Performed by: DERMATOLOGY

## 2025-08-25 PROCEDURE — 1160F RVW MEDS BY RX/DR IN RCRD: CPT | Performed by: DERMATOLOGY

## 2025-08-25 PROCEDURE — 1159F MED LIST DOCD IN RCRD: CPT | Performed by: DERMATOLOGY

## 2025-08-25 ASSESSMENT — DERMATOLOGY PATIENT ASSESSMENT: DO YOU HAVE ANY NEW OR CHANGING LESIONS: NO

## 2025-08-25 ASSESSMENT — DERMATOLOGY QUALITY OF LIFE (QOL) ASSESSMENT
RATE HOW BOTHERED YOU ARE BY EFFECTS OF YOUR SKIN PROBLEMS ON YOUR ACTIVITIES (EG, GOING OUT, ACCOMPLISHING WHAT YOU WANT, WORK ACTIVITIES OR YOUR RELATIONSHIPS WITH OTHERS): 0 - NEVER BOTHERED
ARE THERE EXCLUSIONS OR EXCEPTIONS FOR THE QUALITY OF LIFE ASSESSMENT: NO
RATE HOW BOTHERED YOU ARE BY SYMPTOMS OF YOUR SKIN PROBLEM (EG, ITCHING, STINGING BURNING, HURTING OR SKIN IRRITATION): 0 - NEVER BOTHERED
RATE HOW EMOTIONALLY BOTHERED YOU ARE BY YOUR SKIN PROBLEM (FOR EXAMPLE, WORRY, EMBARRASSMENT, FRUSTRATION): 0 - NEVER BOTHERED
DATE THE QUALITY-OF-LIFE ASSESSMENT WAS COMPLETED: 67442

## 2025-08-25 ASSESSMENT — ITCH NUMERIC RATING SCALE: HOW SEVERE IS YOUR ITCHING?: 0

## 2025-08-25 ASSESSMENT — PATIENT GLOBAL ASSESSMENT (PGA): PATIENT GLOBAL ASSESSMENT: PATIENT GLOBAL ASSESSMENT:  1 - CLEAR

## 2025-09-02 ENCOUNTER — TELEPHONE (OUTPATIENT)
Dept: ADMISSION | Facility: HOSPITAL | Age: 80
End: 2025-09-02
Payer: MEDICARE

## 2025-09-02 DIAGNOSIS — F41.9 ANXIETY: ICD-10-CM

## 2025-09-02 RX ORDER — ALPRAZOLAM 0.5 MG/1
0.5 TABLET ORAL 3 TIMES DAILY PRN
Qty: 90 TABLET | Refills: 0 | Status: SHIPPED | OUTPATIENT
Start: 2025-09-02 | End: 2025-10-02

## 2025-09-03 ENCOUNTER — LAB (OUTPATIENT)
Dept: LAB | Facility: HOSPITAL | Age: 80
End: 2025-09-03
Payer: MEDICARE

## 2025-09-03 DIAGNOSIS — Z79.899 CONTROLLED SUBSTANCE AGREEMENT SIGNED: ICD-10-CM

## 2025-09-03 DIAGNOSIS — C91.10 CLL (CHRONIC LYMPHOCYTIC LEUKEMIA) (MULTI): ICD-10-CM

## 2025-09-03 DIAGNOSIS — C34.11 PRIMARY MALIGNANT NEOPLASM OF RIGHT UPPER LOBE OF LUNG (MULTI): ICD-10-CM

## 2025-09-03 LAB
25(OH)D3 SERPL-MCNC: 78 NG/ML (ref 30–100)
ALBUMIN SERPL BCP-MCNC: 4.3 G/DL (ref 3.4–5)
ALP SERPL-CCNC: 48 U/L (ref 33–136)
ALT SERPL W P-5'-P-CCNC: 11 U/L (ref 7–45)
AMPHETAMINES UR QL SCN: ABNORMAL
ANION GAP SERPL CALC-SCNC: 13 MMOL/L (ref 10–20)
AST SERPL W P-5'-P-CCNC: 15 U/L (ref 9–39)
BARBITURATES UR QL SCN: ABNORMAL
BASOPHILS # BLD AUTO: 0.13 X10*3/UL (ref 0–0.1)
BASOPHILS NFR BLD AUTO: 0.3 %
BENZODIAZ UR QL SCN: ABNORMAL
BILIRUB SERPL-MCNC: 0.6 MG/DL (ref 0–1.2)
BUN SERPL-MCNC: 17 MG/DL (ref 6–23)
BZE UR QL SCN: ABNORMAL
CALCIUM SERPL-MCNC: 9 MG/DL (ref 8.6–10.3)
CANNABINOIDS UR QL SCN: ABNORMAL
CHLORIDE SERPL-SCNC: 105 MMOL/L (ref 98–107)
CHOLEST SERPL-MCNC: 175 MG/DL (ref 0–199)
CHOLESTEROL/HDL RATIO: 2.2
CO2 SERPL-SCNC: 27 MMOL/L (ref 21–32)
CREAT SERPL-MCNC: 1.13 MG/DL (ref 0.5–1.05)
EGFRCR SERPLBLD CKD-EPI 2021: 49 ML/MIN/1.73M*2
EOSINOPHIL # BLD AUTO: 0.39 X10*3/UL (ref 0–0.4)
EOSINOPHIL NFR BLD AUTO: 0.8 %
ERYTHROCYTE [DISTWIDTH] IN BLOOD BY AUTOMATED COUNT: 12.9 % (ref 11.5–14.5)
FENTANYL+NORFENTANYL UR QL SCN: ABNORMAL
GLUCOSE SERPL-MCNC: 92 MG/DL (ref 74–99)
HCT VFR BLD AUTO: 36.2 % (ref 36–46)
HDLC SERPL-MCNC: 80 MG/DL
HGB BLD-MCNC: 11.9 G/DL (ref 12–16)
IGA SERPL-MCNC: 85 MG/DL (ref 70–400)
IGG SERPL-MCNC: 566 MG/DL (ref 700–1600)
IGM SERPL-MCNC: 36 MG/DL (ref 40–230)
IMM GRANULOCYTES # BLD AUTO: 0.19 X10*3/UL (ref 0–0.5)
IMM GRANULOCYTES NFR BLD AUTO: 0.4 % (ref 0–0.9)
LDH SERPL L TO P-CCNC: 132 U/L (ref 84–246)
LDLC SERPL CALC-MCNC: 78 MG/DL
LYMPHOCYTES # BLD AUTO: 40.67 X10*3/UL (ref 0.8–3)
LYMPHOCYTES NFR BLD AUTO: 85.4 %
MCH RBC QN AUTO: 31.2 PG (ref 26–34)
MCHC RBC AUTO-ENTMCNC: 32.9 G/DL (ref 32–36)
MCV RBC AUTO: 95 FL (ref 80–100)
METHADONE UR QL SCN: ABNORMAL
MONOCYTES # BLD AUTO: 1.07 X10*3/UL (ref 0.05–0.8)
MONOCYTES NFR BLD AUTO: 2.2 %
NEUTROPHILS # BLD AUTO: 5.2 X10*3/UL (ref 1.6–5.5)
NEUTROPHILS NFR BLD AUTO: 10.9 %
NON HDL CHOLESTEROL: 95 MG/DL (ref 0–149)
NRBC BLD-RTO: 0 /100 WBCS (ref 0–0)
OPIATES UR QL SCN: ABNORMAL
OXYCODONE+OXYMORPHONE UR QL SCN: ABNORMAL
PCP UR QL SCN: ABNORMAL
PLATELET # BLD AUTO: 177 X10*3/UL (ref 150–450)
POTASSIUM SERPL-SCNC: 4 MMOL/L (ref 3.5–5.3)
PROT SERPL-MCNC: 6.4 G/DL (ref 6.4–8.2)
RBC # BLD AUTO: 3.81 X10*6/UL (ref 4–5.2)
RBC MORPH BLD: NORMAL
SODIUM SERPL-SCNC: 141 MMOL/L (ref 136–145)
TRIGL SERPL-MCNC: 85 MG/DL (ref 0–149)
TSH SERPL-ACNC: 1.09 MIU/L (ref 0.44–3.98)
VLDL: 17 MG/DL (ref 0–40)
WBC # BLD AUTO: 47.7 X10*3/UL (ref 4.4–11.3)

## 2025-09-03 PROCEDURE — 84132 ASSAY OF SERUM POTASSIUM: CPT

## 2025-09-03 PROCEDURE — 80307 DRUG TEST PRSMV CHEM ANLYZR: CPT

## 2025-09-03 PROCEDURE — 84443 ASSAY THYROID STIM HORMONE: CPT | Performed by: INTERNAL MEDICINE

## 2025-09-03 PROCEDURE — 80061 LIPID PANEL: CPT | Performed by: INTERNAL MEDICINE

## 2025-09-03 PROCEDURE — 85025 COMPLETE CBC W/AUTO DIFF WBC: CPT

## 2025-09-03 PROCEDURE — 82784 ASSAY IGA/IGD/IGG/IGM EACH: CPT

## 2025-09-03 PROCEDURE — 82306 VITAMIN D 25 HYDROXY: CPT | Performed by: INTERNAL MEDICINE

## 2025-09-03 PROCEDURE — 83615 LACTATE (LD) (LDH) ENZYME: CPT

## 2025-09-03 ASSESSMENT — ENCOUNTER SYMPTOMS
FATIGUE: 0
CHILLS: 0
UNEXPECTED WEIGHT CHANGE: 0
APPETITE CHANGE: 0
ROS SKIN COMMENTS: BRUISING
MUSCULOSKELETAL NEGATIVE: 1
DIARRHEA: 0
NEUROLOGICAL NEGATIVE: 1
NAUSEA: 0
VOMITING: 0
BLOOD IN STOOL: 0
RESPIRATORY NEGATIVE: 1
BRUISES/BLEEDS EASILY: 1
CARDIOVASCULAR NEGATIVE: 1
FEVER: 0
DIAPHORESIS: 0

## 2025-09-04 ENCOUNTER — OFFICE VISIT (OUTPATIENT)
Dept: HEMATOLOGY/ONCOLOGY | Facility: HOSPITAL | Age: 80
End: 2025-09-04
Payer: MEDICARE

## 2025-09-04 ENCOUNTER — SPECIALTY PHARMACY (OUTPATIENT)
Dept: HEMATOLOGY/ONCOLOGY | Facility: HOSPITAL | Age: 80
End: 2025-09-04
Payer: MEDICARE

## 2025-09-04 VITALS
OXYGEN SATURATION: 99 % | HEART RATE: 91 BPM | BODY MASS INDEX: 22.07 KG/M2 | RESPIRATION RATE: 16 BRPM | DIASTOLIC BLOOD PRESSURE: 57 MMHG | TEMPERATURE: 97 F | HEIGHT: 63 IN | SYSTOLIC BLOOD PRESSURE: 145 MMHG | WEIGHT: 124.56 LBS

## 2025-09-04 DIAGNOSIS — C91.10 CLL (CHRONIC LYMPHOCYTIC LEUKEMIA) (MULTI): ICD-10-CM

## 2025-09-04 PROCEDURE — 1125F AMNT PAIN NOTED PAIN PRSNT: CPT | Performed by: INTERNAL MEDICINE

## 2025-09-04 PROCEDURE — 99214 OFFICE O/P EST MOD 30 MIN: CPT | Performed by: INTERNAL MEDICINE

## 2025-09-04 PROCEDURE — 1159F MED LIST DOCD IN RCRD: CPT | Performed by: INTERNAL MEDICINE

## 2025-09-04 PROCEDURE — 3078F DIAST BP <80 MM HG: CPT | Performed by: INTERNAL MEDICINE

## 2025-09-04 PROCEDURE — 3077F SYST BP >= 140 MM HG: CPT | Performed by: INTERNAL MEDICINE

## 2025-09-04 ASSESSMENT — PAIN SCALES - GENERAL: PAINLEVEL_OUTOF10: 8

## 2025-09-05 ASSESSMENT — ENCOUNTER SYMPTOMS: CONSTIPATION: 0

## 2025-09-25 ENCOUNTER — APPOINTMENT (OUTPATIENT)
Dept: OPHTHALMOLOGY | Facility: CLINIC | Age: 80
End: 2025-09-25
Payer: MEDICARE

## (undated) DEVICE — HYSTEROSCOPE, AVETA CORAL, 4.6MM

## (undated) DEVICE — TOWEL, SURGICAL, NEURO, O/R, 16 X 26, BLUE, STERILE

## (undated) DEVICE — DRAPE, PAD, PREP, W/ 9 IN CUFF, 24 X 41, LF, NS

## (undated) DEVICE — ACCESSORY, FLUID MANAGEMENT, AVETA

## (undated) DEVICE — GOWN, SURGICAL, SMARTGOWN, XLARGE, STERILE

## (undated) DEVICE — GOWN, ASTOUND, L

## (undated) DEVICE — REST, HEAD, BAGEL, 9 IN

## (undated) DEVICE — COVER, CART, 45 X 27 X 48 IN, CLEAR

## (undated) DEVICE — SYRINGE, LUER LOCK, 12ML

## (undated) DEVICE — GLOVE, SURGICAL, PROTEXIS,  6.0, PF, LATEX

## (undated) DEVICE — Device

## (undated) DEVICE — DEVICE, RESECTING, AVETA FLEX, 2.9MM

## (undated) DEVICE — PREP TRAY, SKIN, DRY, W/GLOVES

## (undated) DEVICE — ACCESSORY, AVETA, WASTE MANAGEMENT WITH CAP